# Patient Record
Sex: FEMALE | Race: WHITE | Employment: OTHER | URBAN - NONMETROPOLITAN AREA
[De-identification: names, ages, dates, MRNs, and addresses within clinical notes are randomized per-mention and may not be internally consistent; named-entity substitution may affect disease eponyms.]

---

## 2017-02-08 LAB
AVERAGE GLUCOSE: NORMAL
HBA1C MFR BLD: 8.4 %

## 2017-07-21 RX ORDER — AMLODIPINE BESYLATE 10 MG/1
10 TABLET ORAL DAILY
COMMUNITY
End: 2017-07-21 | Stop reason: SDUPTHER

## 2017-07-22 RX ORDER — AMLODIPINE BESYLATE 10 MG/1
10 TABLET ORAL DAILY
Qty: 30 TABLET | Refills: 5 | Status: SHIPPED | OUTPATIENT
Start: 2017-07-22 | End: 2018-01-23 | Stop reason: SDUPTHER

## 2017-08-10 VITALS
SYSTOLIC BLOOD PRESSURE: 156 MMHG | DIASTOLIC BLOOD PRESSURE: 98 MMHG | WEIGHT: 211 LBS | BODY MASS INDEX: 38.83 KG/M2 | HEIGHT: 62 IN

## 2017-08-10 DIAGNOSIS — I10 HYPERTENSION, ESSENTIAL: ICD-10-CM

## 2017-08-10 DIAGNOSIS — E78.5 HYPERLIPIDEMIA, UNSPECIFIED HYPERLIPIDEMIA TYPE: ICD-10-CM

## 2017-08-10 DIAGNOSIS — R00.2 PALPITATIONS: ICD-10-CM

## 2017-08-10 RX ORDER — OMEPRAZOLE 20 MG/1
20 CAPSULE, DELAYED RELEASE ORAL DAILY
COMMUNITY
End: 2019-10-16 | Stop reason: SDUPTHER

## 2017-08-10 RX ORDER — CANDESARTAN 16 MG/1
16 TABLET ORAL DAILY
COMMUNITY
End: 2018-03-02 | Stop reason: DRUGHIGH

## 2017-08-10 RX ORDER — HYDROCHLOROTHIAZIDE 25 MG/1
25 TABLET ORAL DAILY
COMMUNITY
End: 2018-03-02 | Stop reason: SDUPTHER

## 2018-01-24 RX ORDER — AMLODIPINE BESYLATE 10 MG/1
10 TABLET ORAL DAILY
Qty: 30 TABLET | Refills: 0 | Status: SHIPPED | OUTPATIENT
Start: 2018-01-24 | End: 2018-03-02 | Stop reason: SDUPTHER

## 2018-03-02 ENCOUNTER — OFFICE VISIT (OUTPATIENT)
Dept: FAMILY MEDICINE CLINIC | Age: 63
End: 2018-03-02
Payer: COMMERCIAL

## 2018-03-02 VITALS
BODY MASS INDEX: 36.53 KG/M2 | HEART RATE: 93 BPM | OXYGEN SATURATION: 98 % | DIASTOLIC BLOOD PRESSURE: 96 MMHG | WEIGHT: 196.5 LBS | SYSTOLIC BLOOD PRESSURE: 154 MMHG

## 2018-03-02 DIAGNOSIS — R00.2 PALPITATIONS: ICD-10-CM

## 2018-03-02 DIAGNOSIS — E11.8 TYPE 2 DIABETES MELLITUS WITH COMPLICATION, WITHOUT LONG-TERM CURRENT USE OF INSULIN (HCC): Primary | ICD-10-CM

## 2018-03-02 DIAGNOSIS — I10 HYPERTENSION, ESSENTIAL: ICD-10-CM

## 2018-03-02 DIAGNOSIS — Z11.59 ENCOUNTER FOR HEPATITIS C SCREENING TEST FOR LOW RISK PATIENT: ICD-10-CM

## 2018-03-02 LAB — HBA1C MFR BLD: 7.1 %

## 2018-03-02 PROCEDURE — 83036 HEMOGLOBIN GLYCOSYLATED A1C: CPT | Performed by: FAMILY MEDICINE

## 2018-03-02 PROCEDURE — 99214 OFFICE O/P EST MOD 30 MIN: CPT | Performed by: FAMILY MEDICINE

## 2018-03-02 RX ORDER — HYDROCHLOROTHIAZIDE 25 MG/1
25 TABLET ORAL DAILY
Qty: 30 TABLET | Refills: 5 | Status: SHIPPED | OUTPATIENT
Start: 2018-03-02 | End: 2018-09-04 | Stop reason: SDUPTHER

## 2018-03-02 RX ORDER — AMLODIPINE BESYLATE 10 MG/1
10 TABLET ORAL DAILY
Qty: 30 TABLET | Refills: 5 | Status: SHIPPED | OUTPATIENT
Start: 2018-03-02 | End: 2018-09-04 | Stop reason: SDUPTHER

## 2018-03-02 ASSESSMENT — ENCOUNTER SYMPTOMS
SHORTNESS OF BREATH: 0
COUGH: 0
ORTHOPNEA: 0
CONSTIPATION: 1
DIARRHEA: 1
BLURRED VISION: 0
ABDOMINAL PAIN: 1
WHEEZING: 0

## 2018-03-02 NOTE — TELEPHONE ENCOUNTER
Efrem Willoughby is calling to request a refill on the following medication(s):  Requested Prescriptions     Pending Prescriptions Disp Refills    metFORMIN (GLUCOPHAGE) 500 MG tablet [Pharmacy Med Name: METFORMIN 500MG TAB] 120 tablet 5     Sig: TAKE TWO TABLETS BY MOUTH TWICE DAILY       Last Visit Date (If Applicable):  4/9/9990    Next Visit Date:    Visit date not found

## 2018-03-02 NOTE — PROGRESS NOTES
[address] [address]  Dept: [phone]  Dept Fax: [phone]    Trudi Client is a 60 y.o. female who presents today for her medical conditions/complaints as noted below. Trudi Client is c/o of Hypertension; Hyperlipidemia; and Diabetes      HPI:     Hypertension   This is a chronic problem. The current episode started more than 1 year ago. The problem is unchanged. The problem is controlled. Pertinent negatives include no anxiety, blurred vision, chest pain, headaches, orthopnea, palpitations, peripheral edema, PND, shortness of breath or sweats. There are no associated agents to hypertension. Past treatments include angiotensin blockers and diuretics. Hyperlipidemia   Pertinent negatives include no chest pain or shortness of breath. Diabetes   Pertinent negatives for hypoglycemia include no headaches or sweats. Pertinent negatives for diabetes include no blurred vision and no chest pain. Trudi Client is a 60 y.o. female who presents for follow-up of hypertension, diabetes, and hyperlipidemia. She indicates that she is feeling well and denies any symptoms referable to her elevated blood pressure or diabetes. Specifically denies chest pain, palpitations, dyspnea, peripheral edema, thirst, frequent urination, and blurred vision. Current medication regimen is as listed below. She denies any side effects of medication, and has been taking it regularly. Home glucose readings have been not checked. Checks blood sugars NA. [facility]. Diabetic complications includenone. 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 Hasbeen following a diabetic diet. Has a lot of pain in her feet for many years, wonders what she can take, has all over. Usually takes IB. Does not always help.        Has some palpitations intermittently, palpitations when she is relaxing and watching TV, not when she is doing things. Has intermittent chest pains as well on the left side, sharp pains, last 10-15 secs, may last at the most a minute or 2. Does not take her breath away. BP Readings from Last 3 Encounters:   03/02/18 (!) 154/96   02/08/17 (!) 156/98          (goal 120/80)    Wt Readings from Last 3 Encounters:   03/02/18 196 lb 8 oz (89.1 kg)   02/08/17 211 lb (95.7 kg)        Past Medical History:   Diagnosis Date    Bone spur of foot     bilateral    Breast lump     left breast lump - benign (removed)    Hyperlipidemia     Hypertension     Type 2 diabetes mellitus without complication (HCC)       Past Surgical History:   Procedure Laterality Date    APPENDECTOMY      CERVICAL FUSION      C3-C5    COLON SURGERY Right 1999    resection    COLONOSCOPY  2003    diverticulitis    FOOT SURGERY Bilateral     bone spurs in the feet    HYSTERECTOMY VAGINAL         Family History   Problem Relation Age of Onset    Breast Cancer Mother     Diabetes Mother     Other Mother      diverticulitis (bowel resection)    Cancer Father      prostate, squamous cell Ca    Other Maternal Grandfather      diverticulitis       Social History   Substance Use Topics    Smoking status: Never Smoker    Smokeless tobacco: Never Used    Alcohol use No      Current Outpatient Prescriptions   Medication Sig Dispense Refill    SITagliptin (JANUVIA) 100 MG tablet Take 1 tablet by mouth daily 30 tablet 5    amLODIPine (NORVASC) 10 MG tablet Take 1 tablet by mouth daily 30 tablet 5    hydrochlorothiazide (HYDRODIURIL) 25 MG tablet Take 1 tablet by mouth daily 30 tablet 5    metFORMIN (GLUCOPHAGE) 500 MG tablet Take 500 mg by mouth 2 times daily (with meals) 2 tabs      omeprazole (PRILOSEC) 20 MG delayed release capsule Take 20 mg by mouth daily       No current facility-administered medications for this visit.       Allergies   Allergen Reactions    Sulfa Antibiotics Hives    Metronidazole Nausea And Vomiting       Health regular rhythm and intact distal pulses. Exam reveals no gallop and no friction rub. No murmur heard. Pulmonary/Chest: Effort normal and breath sounds normal. No respiratory distress. Abdominal: Soft. Musculoskeletal: She exhibits no edema. Lymphadenopathy:     She has no cervical adenopathy. Neurological: She is alert and oriented to person, place, and time. Skin: Skin is warm. Psychiatric: She has a normal mood and affect. Her behavior is normal. Judgment and thought content normal.   Nursing note and vitals reviewed. Diabetic Foot Exam    Deformities: Yes  scars medial heel and arthritic change  Pulses:  normal,   Edema: abnormal - trace bilateral  Skin lesions: normal  Callous:  No  Nails: normal    Sensory exam  Monofilament Sensation Left Foot: 10/10   Monofilament Sensation Right Foot: 10/10       Assessment/Plan:     1. Type 2 diabetes mellitus with complication, without long-term current use of insulin (HCC)  POCT glycosylated hemoglobin (Hb A1C)    SITagliptin (JANUVIA) 100 MG tablet    Vitamin B12    Lipid Panel    AST    Basic Metabolic Panel    Microalbumin, Ur    ALT    HM DIABETES FOOT EXAM   2. Encounter for hepatitis C screening test for low risk patient  Hepatitis C Antibody   3. Hypertension, essential  amLODIPine (NORVASC) 10 MG tablet    hydrochlorothiazide (HYDRODIURIL) 25 MG tablet    Holter Monitor 24 Hour   4. Palpitations  Holter Monitor 24 Hour    EKG 12 Lead         Lab Results   Component Value Date    LABA1C 7.1 03/02/2018       Return in about 3 months (around 6/2/2018) for HTN. Patient given educational materials - see patient instructions. Discussed use, benefit, and side effects of prescribed medications. All patient questions answered. Pt voiced understanding. Reviewed health maintenance. Instructed to continue current medications, diet and exercise. Patient agreed with treatment plan. Follow up as directed.      Electronically signed by Hemalatha Méndez Roberta Bella MD on 3/2/2018

## 2018-03-09 LAB
AGE FOR GFR: 63
ALT SERPL-CCNC: 44 UNITS/L
ANION GAP SERPL CALCULATED.3IONS-SCNC: 21 MMOL/L
AST SERPL-CCNC: 16 UNITS/L
BUN BLDV-MCNC: 14 MG/DL
CALCIUM SERPL-MCNC: 10.4 MG/DL
CHLORIDE BLD-SCNC: 104 MMOL/L
CHOLESTEROL/HDL RATIO: 5.3 RATIO
CHOLESTEROL: 276 MG/DL
CO2: 24 MMOL/L
CREAT SERPL-MCNC: 0.7 MG/DL
CREATININE, RANDOM: 271.4 MG/DL
EGFR BF: 102 ML/MIN/1.73 M2
EGFR BM: 138 ML/MIN/1.73 M2
EGFR WF: 85 ML/MIN/1.73 M2
EGFR WM: 114 ML/MIN/1.73 M2
GLUCOSE: 166 MG/DL
HDL, DIRECT: 52 MG/DL
HEPATITIS C IGG: NORMAL
LDL CHOLESTEROL CALCULATED: 164.2 MG/DL
MICROALBUMIN UR-MCNC: 10.8 MG/DL
MICROALBUMIN/CREAT UR-RTO: 39.8 MCG/MG CR
POTASSIUM SERPL-SCNC: 4.5 MMOL/L
SIGNAL/CUTOFF: NORMAL
SODIUM BLD-SCNC: 144 MMOL/L
TRIGL SERPL-MCNC: 299 MG/DL
VITAMIN B-12: 957 PG/ML
VLDLC SERPL CALC-MCNC: 60 MG/DL

## 2018-03-18 ENCOUNTER — TELEPHONE (OUTPATIENT)
Dept: FAMILY MEDICINE CLINIC | Age: 63
End: 2018-03-18

## 2018-03-19 ENCOUNTER — TELEPHONE (OUTPATIENT)
Dept: FAMILY MEDICINE CLINIC | Age: 63
End: 2018-03-19

## 2018-03-20 NOTE — TELEPHONE ENCOUNTER
Santos Holstein called back and was wondering about her labs, was wondering about a cholesterol medication and her B12 labs, was wondering about this and her memory and metformin

## 2018-03-21 NOTE — TELEPHONE ENCOUNTER
With the other issues she has going on I would like to wait until her appt in June to start the medication. I agree with a cholesterol mediation but would prefer to start at her appt rather than over the telephone.

## 2018-09-04 DIAGNOSIS — I10 HYPERTENSION, ESSENTIAL: ICD-10-CM

## 2018-09-04 DIAGNOSIS — E11.8 TYPE 2 DIABETES MELLITUS WITH COMPLICATION, WITHOUT LONG-TERM CURRENT USE OF INSULIN (HCC): ICD-10-CM

## 2018-09-04 NOTE — TELEPHONE ENCOUNTER
Teagan Sotelo is calling to request a refill on the following medication(s):  Requested Prescriptions     Pending Prescriptions Disp Refills    amLODIPine (NORVASC) 10 MG tablet 30 tablet 5     Sig: Take 1 tablet by mouth daily    hydrochlorothiazide (HYDRODIURIL) 25 MG tablet 30 tablet 5     Sig: Take 1 tablet by mouth daily    SITagliptin (JANUVIA) 100 MG tablet 30 tablet 5     Sig: Take 1 tablet by mouth daily       Last Visit Date (If Applicable):  Visit date not found    Next Visit Date:    Visit date not found

## 2018-09-05 RX ORDER — HYDROCHLOROTHIAZIDE 25 MG/1
25 TABLET ORAL DAILY
Qty: 30 TABLET | Refills: 5 | Status: SHIPPED | OUTPATIENT
Start: 2018-09-05 | End: 2019-03-05 | Stop reason: SDUPTHER

## 2018-09-05 RX ORDER — AMLODIPINE BESYLATE 10 MG/1
10 TABLET ORAL DAILY
Qty: 30 TABLET | Refills: 5 | Status: SHIPPED | OUTPATIENT
Start: 2018-09-05 | End: 2019-03-05 | Stop reason: SDUPTHER

## 2018-09-21 ENCOUNTER — TELEPHONE (OUTPATIENT)
Dept: FAMILY MEDICINE CLINIC | Age: 63
End: 2018-09-21

## 2019-03-05 DIAGNOSIS — I10 HYPERTENSION, ESSENTIAL: ICD-10-CM

## 2019-03-05 RX ORDER — HYDROCHLOROTHIAZIDE 25 MG/1
25 TABLET ORAL DAILY
Qty: 30 TABLET | Refills: 1 | Status: SHIPPED | OUTPATIENT
Start: 2019-03-05 | End: 2019-05-08 | Stop reason: SDUPTHER

## 2019-03-05 RX ORDER — AMLODIPINE BESYLATE 10 MG/1
10 TABLET ORAL DAILY
Qty: 30 TABLET | Refills: 1 | Status: SHIPPED | OUTPATIENT
Start: 2019-03-05 | End: 2019-05-15 | Stop reason: SDUPTHER

## 2019-05-08 DIAGNOSIS — I10 HYPERTENSION, ESSENTIAL: ICD-10-CM

## 2019-05-08 RX ORDER — HYDROCHLOROTHIAZIDE 25 MG/1
TABLET ORAL
Qty: 30 TABLET | Refills: 0 | Status: SHIPPED | OUTPATIENT
Start: 2019-05-08 | End: 2019-05-15 | Stop reason: SDUPTHER

## 2019-05-08 NOTE — TELEPHONE ENCOUNTER
Shary Lanes is calling to request a refill on the following medication(s):  Requested Prescriptions     Pending Prescriptions Disp Refills    metFORMIN (GLUCOPHAGE) 500 MG tablet [Pharmacy Med Name: METFORMIN 500MG TAB] 120 tablet 5     Sig: TAKE 2 TABLETS BY MOUTH TWICE DAILY    hydrochlorothiazide (HYDRODIURIL) 25 MG tablet [Pharmacy Med Name: HYDROCHLOROT 25MG   TAB] 30 tablet 1     Sig: TAKE 1 TABLET BY MOUTH ONCE DAILY       Last Visit Date (If Applicable):  5/4/0116    Next Visit Date:    5/15/2019

## 2019-05-15 ENCOUNTER — OFFICE VISIT (OUTPATIENT)
Dept: FAMILY MEDICINE CLINIC | Age: 64
End: 2019-05-15
Payer: MEDICARE

## 2019-05-15 VITALS
HEART RATE: 102 BPM | DIASTOLIC BLOOD PRESSURE: 96 MMHG | OXYGEN SATURATION: 97 % | SYSTOLIC BLOOD PRESSURE: 154 MMHG | BODY MASS INDEX: 36.81 KG/M2 | WEIGHT: 198 LBS

## 2019-05-15 DIAGNOSIS — M25.50 ARTHRALGIA, UNSPECIFIED JOINT: ICD-10-CM

## 2019-05-15 DIAGNOSIS — R00.2 PALPITATIONS: ICD-10-CM

## 2019-05-15 DIAGNOSIS — I10 HYPERTENSION, ESSENTIAL: ICD-10-CM

## 2019-05-15 DIAGNOSIS — R22.31 MASS OF RIGHT UPPER EXTREMITY: ICD-10-CM

## 2019-05-15 DIAGNOSIS — E11.8 TYPE 2 DIABETES MELLITUS WITH COMPLICATION, WITHOUT LONG-TERM CURRENT USE OF INSULIN (HCC): Primary | ICD-10-CM

## 2019-05-15 DIAGNOSIS — T50.905A ADVERSE EFFECT DUE TO CORRECT MEDICINAL SUBSTANCE, PROPERLY GIVEN, INITIAL ENCOUNTER: ICD-10-CM

## 2019-05-15 DIAGNOSIS — E78.5 HYPERLIPIDEMIA, UNSPECIFIED HYPERLIPIDEMIA TYPE: ICD-10-CM

## 2019-05-15 DIAGNOSIS — R10.11 RIGHT UPPER QUADRANT PAIN: ICD-10-CM

## 2019-05-15 LAB — HBA1C MFR BLD: 8.1 %

## 2019-05-15 PROCEDURE — G8400 PT W/DXA NO RESULTS DOC: HCPCS | Performed by: FAMILY MEDICINE

## 2019-05-15 PROCEDURE — 3045F PR MOST RECENT HEMOGLOBIN A1C LEVEL 7.0-9.0%: CPT | Performed by: FAMILY MEDICINE

## 2019-05-15 PROCEDURE — G8427 DOCREV CUR MEDS BY ELIG CLIN: HCPCS | Performed by: FAMILY MEDICINE

## 2019-05-15 PROCEDURE — 83036 HEMOGLOBIN GLYCOSYLATED A1C: CPT | Performed by: FAMILY MEDICINE

## 2019-05-15 PROCEDURE — 2022F DILAT RTA XM EVC RTNOPTHY: CPT | Performed by: FAMILY MEDICINE

## 2019-05-15 PROCEDURE — 1123F ACP DISCUSS/DSCN MKR DOCD: CPT | Performed by: FAMILY MEDICINE

## 2019-05-15 PROCEDURE — 4040F PNEUMOC VAC/ADMIN/RCVD: CPT | Performed by: FAMILY MEDICINE

## 2019-05-15 PROCEDURE — 99214 OFFICE O/P EST MOD 30 MIN: CPT | Performed by: FAMILY MEDICINE

## 2019-05-15 PROCEDURE — 1036F TOBACCO NON-USER: CPT | Performed by: FAMILY MEDICINE

## 2019-05-15 PROCEDURE — 1090F PRES/ABSN URINE INCON ASSESS: CPT | Performed by: FAMILY MEDICINE

## 2019-05-15 PROCEDURE — 3017F COLORECTAL CA SCREEN DOC REV: CPT | Performed by: FAMILY MEDICINE

## 2019-05-15 PROCEDURE — G8417 CALC BMI ABV UP PARAM F/U: HCPCS | Performed by: FAMILY MEDICINE

## 2019-05-15 RX ORDER — HYDROCHLOROTHIAZIDE 25 MG/1
TABLET ORAL
Qty: 30 TABLET | Refills: 5 | Status: SHIPPED | OUTPATIENT
Start: 2019-05-15 | End: 2019-10-16 | Stop reason: SDUPTHER

## 2019-05-15 RX ORDER — AMLODIPINE BESYLATE 10 MG/1
10 TABLET ORAL DAILY
Qty: 30 TABLET | Refills: 5 | Status: SHIPPED | OUTPATIENT
Start: 2019-05-15 | End: 2019-10-16 | Stop reason: SDUPTHER

## 2019-05-15 ASSESSMENT — PATIENT HEALTH QUESTIONNAIRE - PHQ9
SUM OF ALL RESPONSES TO PHQ QUESTIONS 1-9: 0
SUM OF ALL RESPONSES TO PHQ QUESTIONS 1-9: 0
2. FEELING DOWN, DEPRESSED OR HOPELESS: 0
1. LITTLE INTEREST OR PLEASURE IN DOING THINGS: 0
SUM OF ALL RESPONSES TO PHQ9 QUESTIONS 1 & 2: 0

## 2019-05-15 ASSESSMENT — ENCOUNTER SYMPTOMS
WHEEZING: 0
ABDOMINAL PAIN: 0
COUGH: 0
DIARRHEA: 1
SHORTNESS OF BREATH: 0
CONSTIPATION: 0

## 2019-05-15 NOTE — PATIENT INSTRUCTIONS
Can try the OTC tumeric daily for the joints     Can try the Ibuprofen but limit the dose-- max 3 per day. Continue with the weight loss and exercise to help with the pain as well. Check the uric acid level for possible gout with the family history.

## 2019-05-15 NOTE — TELEPHONE ENCOUNTER
Only a 15 day supply was sent in with 60 tabs. Patient is taking two tablets by mouth twice daily, switched to 120 tabs.      Tommy Lutheran is calling to request a refill on the following medication(s):  Requested Prescriptions     Pending Prescriptions Disp Refills    metFORMIN (GLUCOPHAGE) 500 MG tablet 120 tablet 5     Sig: TAKE 2 TABLETS BY MOUTH TWICE DAILY       Last Visit Date (If Applicable):  0/95/4748    Next Visit Date:    9/18/2019

## 2019-05-15 NOTE — PROGRESS NOTES
[address] [id number]  Dept: [phone]  Dept [phone]    Ryan Adams is a 71 y.o. female who presents today for her medical conditions/complaints as notedbelow. Ryan Adams is c/o of 3 Month Follow-Up and Diabetes      HPI:     HPI    Ryan Adams is a 71 y.o. female who presents for follow-up of hypertension, diabetes, and hyperlipidemia. She indicates that she is feeling well and denies any symptoms referable to her elevated blood pressure or diabetes. Specifically denies chest pain, palpitations, dyspnea, peripheral edema, thirst, frequent urination, and blurred vision. Current medication regimen is as listed below. She denies any side effects of medication, and has been taking it regularly. Home glucose readings have been not checking. Diabetic complications includenone. shehas been exercising regularly working outside. Hasbeen following a diabetic diet. Has been checking her BP at home and has been in the 120's/ 70-80's     Has pain in the left great toe often, will flare up frequently. Gets so sore the sheets even hurt to touch it. Thinks it may be gout because has a family history but has never been actually diagnosed herself with gout. No fevers or chills and today is a good day with the toe    Gets a lot of knee pain, back pain. Has tried the tylenol arthritis. Has been taking the IBU which does help    Has some pain in her RUQ at times, usually at night will last through the middles. Has intermittent nausea as well, the nausea comes on quickly. Has had the nausea for years. Bowels go back and forth from loose to semi formed but this has been for years. The pain in the RUQ is more recent, maybe the last few months.   No change in the bowels from her previous pattern, no blood in her bowels     BP Readings from Last 3 Encounters:   05/15/19 (!) 154/96   18 (!) 154/96   17 (!) 156/98 (goal 120/80)    Wt Readings from Last 3 Encounters:   05/15/19 198 lb (89.8 kg)   03/02/18 196 lb 8 oz (89.1 kg)   02/08/17 211 lb (95.7 kg)        Past Medical History:   Diagnosis Date    Bone spur of foot     bilateral    Breast lump     left breast lump - benign (removed)    Hyperlipidemia     Hypertension     Type 2 diabetes mellitus without complication (HCC)       Past Surgical History:   Procedure Laterality Date    APPENDECTOMY      CERVICAL FUSION      C3-C5    COLON SURGERY Right 1999    resection    COLONOSCOPY  2003    diverticulitis    FOOT SURGERY Bilateral     bone spurs in the feet    HYSTERECTOMY VAGINAL         Family History   Problem Relation Age of Onset    Breast Cancer Mother     Diabetes Mother     Other Mother         diverticulitis (bowel resection)    Cancer Father         prostate, squamous cell Ca    Other Maternal Grandfather         diverticulitis       Social History     Tobacco Use    Smoking status: Never Smoker    Smokeless tobacco: Never Used   Substance Use Topics    Alcohol use: No      Current Outpatient Medications   Medication Sig Dispense Refill    dapagliflozin (FARXIGA) 10 MG tablet Take 1 tablet by mouth every morning 30 tablet 3    metFORMIN (GLUCOPHAGE) 500 MG tablet TAKE 2 TABLETS BY MOUTH TWICE DAILY 60 tablet 0    hydrochlorothiazide (HYDRODIURIL) 25 MG tablet TAKE 1 TABLET BY MOUTH ONCE DAILY 30 tablet 0    amLODIPine (NORVASC) 10 MG tablet Take 1 tablet by mouth daily 30 tablet 1    omeprazole (PRILOSEC) 20 MG delayed release capsule Take 20 mg by mouth daily      SITagliptin (JANUVIA) 100 MG tablet Take 1 tablet by mouth daily 30 tablet 5     No current facility-administered medications for this visit.       Allergies   Allergen Reactions    Sulfa Antibiotics Hives    Metronidazole Nausea And Vomiting       Health Maintenance   Topic Date Due    Diabetic retinal exam  05/02/1964    HIV screen  05/02/1969    DTaP/Tdap/Td vaccine (1 - Tdap) 05/02/1973    Shingles Vaccine (1 of 2) 05/02/2004    Colon cancer screen colonoscopy  05/02/2004    Breast cancer screen  05/09/2017    Diabetic foot exam  03/02/2019    A1C test (Diabetic or Prediabetic)  03/02/2019    Diabetic microalbuminuria test  03/09/2019    Lipid screen  03/09/2019    Potassium monitoring  03/09/2019    Creatinine monitoring  03/09/2019    Pneumococcal 65+ years Vaccine (1 of 2 - PCV13) 05/02/2019    DEXA (modify frequency per FRAX score)  05/02/2019    Flu vaccine (Season Ended) 09/01/2019    Hepatitis C screen  Completed       Subjective:      Review of Systems  Respiratory: Negative for cough, shortness of breath and wheezing. Cardiovascular: Positive for chest pain ( still experiencing some of the same sx that i had before i had the holter monitor. ) and leg swelling ( left ankle). Gastrointestinal: Positive for diarrhea ( i have diverticulitis). Negative for abdominal pain and constipation. Endocrine: Negative for polydipsia. Genitourinary: Negative for frequency and urgency. I do have to get up during the night     Neurological: Positive for dizziness ( i kind of think that it is BS related, but its not bad. ). Negative for headaches. Objective:     BP (!) 154/96   Pulse 102   Wt 198 lb (89.8 kg)   SpO2 97%   BMI 36.81 kg/m²     Physical Exam   Constitutional: She is oriented to person, place, and time. She appears well-developed and well-nourished. HENT:   Head: Normocephalic and atraumatic. Eyes: Conjunctivae and EOM are normal.   Neck: Normal range of motion. Neck supple. No JVD present. No thyromegaly present. Cardiovascular: Normal rate, regular rhythm and intact distal pulses. Exam reveals no gallop and no friction rub. No murmur heard. Pulmonary/Chest: Effort normal and breath sounds normal. No respiratory distress. Abdominal: Soft. There is tenderness (RUQ with positive Michaels's sign).    Musculoskeletal: She for the joints     Can try the Ibuprofen but limit the dose-- max 3 per day. Continue with the weight loss and exercise to help with the pain as well. Check the uric acid level for possible gout with the family history. -- watch for further GI irritation with the NSAIDS and the tumeric. Lab Results   Component Value Date    CHOL 276 (H) 03/09/2018    TRIG 299 (H) 03/09/2018    ALT 44 03/09/2018    AST 16 03/09/2018     03/09/2018    K 4.5 03/09/2018     03/09/2018    CREATININE 0.7 03/09/2018    BUN 14 03/09/2018    CO2 24 03/09/2018    LABA1C 8.1 05/15/2019    LABA1C 7.1 03/02/2018    LABA1C 8.4 02/08/2017    LABMICR 10.8 (H) 03/09/2018       Return 4 months, for AWV. Patient given educational materials - see patientinstructions. Discussed use, benefit, and side effects of prescribed medications. All patient questions answered. Pt voiced understanding. Reviewed health maintenance. Instructed to continue current medications, diet andexercise. Patient agreed with treatment plan. Follow up as directed.      Electronically signed by Mikayla Montes MD on 5/15/2019

## 2019-05-16 ENCOUNTER — TELEPHONE (OUTPATIENT)
Dept: FAMILY MEDICINE CLINIC | Age: 64
End: 2019-05-16

## 2019-05-16 DIAGNOSIS — K81.1 CHOLECYSTITIS, CHRONIC: ICD-10-CM

## 2019-05-16 DIAGNOSIS — E11.8 TYPE 2 DIABETES MELLITUS WITH COMPLICATION, WITHOUT LONG-TERM CURRENT USE OF INSULIN (HCC): Primary | ICD-10-CM

## 2019-05-16 NOTE — TELEPHONE ENCOUNTER
Rose Marie Avila will cost her $465/month, is there anything cheaper or could her metformin be increased? ?    Please Advise

## 2019-05-17 NOTE — TELEPHONE ENCOUNTER
Can you please check with the pharmacy, There is not really other good options that will be less expensive

## 2019-05-17 NOTE — TELEPHONE ENCOUNTER
She stated it was monthly as far as she knew and she will not be paying this amount due to her  having cancer and the money needs spent towards that She would still like something cheaper if there is anything sent in

## 2019-05-20 NOTE — TELEPHONE ENCOUNTER
Please let Kiandbalwinder Torrezdayton know this is an inappropriate dose and an inappropriate medication for her. It is not equivalent and has potential harm for her. Please have her see if the $485 per month is until her deductible is met. For example the first month or is this every month?

## 2019-05-20 NOTE — TELEPHONE ENCOUNTER
Gary for Berenice to contact her insurance to see what is compatible and what they would cover and to return call

## 2019-05-21 NOTE — TELEPHONE ENCOUNTER
Sent in the onglyza, please check with [facility] and see what her cost would be on this and if still unreasonable then will need to try something else less effective and less recommended.   Script for the metformin also sent in

## 2019-05-21 NOTE — TELEPHONE ENCOUNTER
Please also see the previous notes re medications-- also let Silvia Camarillo know her GB USN did show her GB is mildly abnormal and likely needs to come out -- is likely causing her sx.   Would recommend she see surgery about having this removed,  Referral put in , in the meantime would try to really stick to a very low fat diet to try to help control sx

## 2019-05-22 RX ORDER — GLIMEPIRIDE 2 MG/1
2 TABLET ORAL
Qty: 30 TABLET | Refills: 5 | Status: SHIPPED | OUTPATIENT
Start: 2019-05-22 | End: 2019-10-16 | Stop reason: SDUPTHER

## 2019-05-22 NOTE — TELEPHONE ENCOUNTER
bulmaro med is $481 and they , the pharmacist is suggesting amorill? Or micronage?     She stated that she has a high deductible and then it goes into to a donut hole, not sure what medication will be cost effective for her

## 2019-05-22 NOTE — TELEPHONE ENCOUNTER
glimeperide sent in. Patient cautioned re hypoglycemia potential. Not to take if not eating. Continue with the metformin.   Sofi Hence will start after her GB is removed because has not been eating well with the GB issue right now

## 2019-05-23 ENCOUNTER — OFFICE VISIT (OUTPATIENT)
Dept: SURGERY | Age: 64
End: 2019-05-23
Payer: MEDICARE

## 2019-05-23 VITALS
HEART RATE: 105 BPM | WEIGHT: 195 LBS | DIASTOLIC BLOOD PRESSURE: 90 MMHG | TEMPERATURE: 99.2 F | HEIGHT: 62 IN | SYSTOLIC BLOOD PRESSURE: 143 MMHG | BODY MASS INDEX: 35.88 KG/M2

## 2019-05-23 DIAGNOSIS — K52.9 CHRONIC DIARRHEA: ICD-10-CM

## 2019-05-23 DIAGNOSIS — R10.13 ABDOMINAL PAIN, EPIGASTRIC: Primary | ICD-10-CM

## 2019-05-23 DIAGNOSIS — R11.2 INTRACTABLE VOMITING WITH NAUSEA, UNSPECIFIED VOMITING TYPE: ICD-10-CM

## 2019-05-23 LAB
A/G RATIO: 1.4 RATIO
AGE FOR GFR: 65
ALBUMIN: 4.7 G/DL (ref 3.5–5)
ALK PHOSPHATASE: 91 UNITS/L (ref 38–126)
ALT SERPL-CCNC: 28 UNITS/L (ref 9–52)
ANION GAP SERPL CALCULATED.3IONS-SCNC: 16 MMOL/L
AST SERPL-CCNC: 20 UNITS/L (ref 14–36)
BASOPHILS # BLD: 0.19 THOU/MM3 (ref 0–0.3)
BILIRUB SERPL-MCNC: 0.5 MG/DL (ref 0.2–1.3)
BUN BLDV-MCNC: 16 MG/DL (ref 7–17)
CALCIUM SERPL-MCNC: 9.3 MG/DL (ref 8.4–10.2)
CHLORIDE BLD-SCNC: 102 MMOL/L (ref 98–120)
CO2: 24 MMOL/L (ref 22–31)
CREAT SERPL-MCNC: 0.7 MG/DL (ref 0.5–1)
DIFFERENTIAL: AUTOMATED DIFF
EGFR BF: 102 ML/MIN/1.73 M2
EGFR BM: 137 ML/MIN/1.73 M2
EGFR WF: 84 ML/MIN/1.73 M2
EGFR WM: 113 ML/MIN/1.73 M2
EOSINOPHIL # BLD: 0.69 THOU/MM3 (ref 0–1.1)
GLOBULIN: 3.3 G/DL
GLUCOSE: 144 MG/DL (ref 65–105)
HCT VFR BLD CALC: 43.3 % (ref 37–47)
HEMOGLOBIN: 15 G/DL (ref 12–16)
LYMPHOCYTES # BLD: 2.51 THOU/MM3 (ref 1–5.5)
MCH RBC QN AUTO: 29.3 PG (ref 28.5–32)
MCHC RBC AUTO-ENTMCNC: 34.5 G/DL (ref 32–37)
MCV RBC AUTO: 84.8 FL (ref 80–94)
MONOCYTES # BLD: 0.8 THOU/MM3 (ref 0.1–1)
NEUTROPHILS: 6.7 THOU/MM3 (ref 2–8.1)
PDW BLD-RTO: 11.6 % (ref 8.5–15.5)
PLATELET # BLD: 328 THOU/MM3 (ref 130–400)
PMV BLD AUTO: 8.4 FL (ref 7.4–11)
POTASSIUM SERPL-SCNC: 3.4 MMOL/L (ref 3.6–5)
RBC # BLD: 5.11 M/UL (ref 4.2–5.4)
SODIUM BLD-SCNC: 139 MMOL/L (ref 135–145)
TOTAL PROTEIN: 8 G/DL (ref 6.3–8.2)
WBC # BLD: 10.89 THOU/ML3 (ref 4.8–10)

## 2019-05-23 PROCEDURE — G8417 CALC BMI ABV UP PARAM F/U: HCPCS | Performed by: SURGERY

## 2019-05-23 PROCEDURE — 3017F COLORECTAL CA SCREEN DOC REV: CPT | Performed by: SURGERY

## 2019-05-23 PROCEDURE — G8427 DOCREV CUR MEDS BY ELIG CLIN: HCPCS | Performed by: SURGERY

## 2019-05-23 PROCEDURE — 4040F PNEUMOC VAC/ADMIN/RCVD: CPT | Performed by: SURGERY

## 2019-05-23 PROCEDURE — 1090F PRES/ABSN URINE INCON ASSESS: CPT | Performed by: SURGERY

## 2019-05-23 PROCEDURE — 1036F TOBACCO NON-USER: CPT | Performed by: SURGERY

## 2019-05-23 PROCEDURE — G8400 PT W/DXA NO RESULTS DOC: HCPCS | Performed by: SURGERY

## 2019-05-23 PROCEDURE — 1123F ACP DISCUSS/DSCN MKR DOCD: CPT | Performed by: SURGERY

## 2019-05-23 PROCEDURE — 99203 OFFICE O/P NEW LOW 30 MIN: CPT | Performed by: SURGERY

## 2019-05-23 RX ORDER — ONDANSETRON 4 MG/1
4 TABLET, ORALLY DISINTEGRATING ORAL EVERY 8 HOURS PRN
Qty: 20 TABLET | Refills: 0 | Status: SHIPPED | OUTPATIENT
Start: 2019-05-23 | End: 2020-10-22 | Stop reason: SDUPTHER

## 2019-05-23 ASSESSMENT — ENCOUNTER SYMPTOMS
DIARRHEA: 1
CONSTIPATION: 0
COUGH: 0
NAUSEA: 1
HEMATOCHEZIA: 0
TROUBLE SWALLOWING: 0
ABDOMINAL PAIN: 1
SHORTNESS OF BREATH: 0
BLOOD IN STOOL: 0
CHOKING: 0
EYES NEGATIVE: 1
ABDOMINAL DISTENTION: 1
CHEST TIGHTNESS: 0
BACK PAIN: 0
WHEEZING: 0
VOICE CHANGE: 0
SORE THROAT: 0
VOMITING: 1

## 2019-05-23 ASSESSMENT — CROHNS DISEASE ACTIVITY INDEX (CDAI): CDAI SCORE: 0

## 2019-05-23 NOTE — PROGRESS NOTES
Subjective:      Patient ID: Efrain Palmer is a 71 y.o. female. Abdominal Pain   This is a chronic problem. The current episode started more than 1 year ago (For several years). The problem occurs 2 to 4 times per day. Duration: 5 - 10 minutes. The problem has been gradually worsening. The pain is located in the epigastric region. The pain is at a severity of 8/10. The quality of the pain is sharp. Associated symptoms include anorexia, arthralgias, diarrhea ( 2 loose bowel movements a day), dysuria, myalgias, nausea, vomiting ( 2 - 3 times a week she vomits first thing in the morning) and weight loss ( 9 lbs over 1 year). Pertinent negatives include no constipation, fever, frequency, headaches, hematochezia, hematuria or melena. The pain is aggravated by eating. Relieved by: heating pad. She has tried nothing for the symptoms. Prior diagnostic workup includes ultrasound. There is no history of abdominal surgery, Crohn's disease, gallstones, GERD, irritable bowel syndrome, pancreatitis, PUD or ulcerative colitis.      She blames a lot of her pain on \"my diverticulitis>    Past Medical History:   Diagnosis Date    Bone spur of foot     bilateral    Breast lump     left breast lump - benign (removed)    Hyperlipidemia     Hypertension     Type 2 diabetes mellitus without complication ([facility] [address]      Past Surgical History:   Procedure Laterality Date    APPENDECTOMY      CERVICAL FUSION      C3-C5    COLON SURGERY Right 1999    resection    COLONOSCOPY  2003    diverticulitis    FOOT SURGERY Bilateral     bone spurs in the feet    HYSTERECTOMY VAGINAL      NECK SURGERY       Current Outpatient Medications   Medication Sig Dispense Refill    metFORMIN (GLUCOPHAGE) 500 MG tablet TAKE 2 TABLETS BY MOUTH TWICE DAILY 120 tablet 5    hydrochlorothiazide (HYDRODIURIL) 25 MG tablet TAKE 1 TABLET BY MOUTH ONCE DAILY 30 tablet 5    amLODIPine (NORVASC) 10 MG tablet Take 1 tablet by mouth daily 30 tablet 5    omeprazole (PRILOSEC) 20 MG delayed release capsule Take 20 mg by mouth daily      glimepiride (AMARYL) 2 MG tablet Take 1 tablet by mouth every morning (before breakfast) 30 tablet 5     No current facility-administered medications for this visit. Allergies   Allergen Reactions    Sulfa Antibiotics Hives    Metronidazole Nausea And Vomiting     Social History     Tobacco Use    Smoking status: Never Smoker    Smokeless tobacco: Never Used   Substance Use Topics    Alcohol use: No    Drug use: No     Family History   Problem Relation Age of Onset    Breast Cancer Mother     Diabetes Mother     Other Mother         diverticulitis (bowel resection)    Cancer Father         prostate, squamous cell Ca    Other Maternal Grandfather         diverticulitis    Heart Attack Maternal Grandmother     Cancer Paternal Grandmother     Cancer Paternal Grandfather        Review of Systems   Constitutional: Positive for appetite change, chills, unexpected weight change and weight loss ( 9 lbs over 1 year). Negative for activity change and fever. HENT: Negative for sore throat, trouble swallowing and voice change. Eyes: Negative. Respiratory: Negative for cough, choking, chest tightness, shortness of breath and wheezing. Cardiovascular: Negative for chest pain and palpitations. Gastrointestinal: Positive for abdominal distention, abdominal pain, anorexia, diarrhea ( 2 loose bowel movements a day), nausea and vomiting ( 2 - 3 times a week she vomits first thing in the morning). Negative for blood in stool, constipation, hematochezia and melena. Endocrine: Positive for polydipsia and polyuria. Negative for polyphagia. Genitourinary: Positive for dysuria. Negative for difficulty urinating, frequency, hematuria, pelvic pain, vaginal bleeding, vaginal discharge and vaginal pain. Musculoskeletal: Positive for arthralgias, myalgias, neck pain and neck stiffness. Negative for back pain.    Neurological: Positive for dizziness and light-headedness. Negative for seizures, speech difficulty, weakness and headaches. Hematological: Negative for adenopathy. Does not bruise/bleed easily. Psychiatric/Behavioral: Negative for agitation, behavioral problems, confusion, decreased concentration and dysphoric mood. The patient is not nervous/anxious. BP (!) 143/90   Pulse 105   Temp 99.2 °F (37.3 °C)   Ht 5' 2\" (1.575 m)   Wt 195 lb (88.5 kg)   BMI 35.67 kg/m²     Objective:   Physical Exam   Constitutional: She appears well-developed and well-nourished. No distress. HENT:   Head: Normocephalic and atraumatic. Eyes: Pupils are equal, round, and reactive to light. Conjunctivae and EOM are normal.   Neck: Normal range of motion. Neck supple. No tracheal deviation present. No thyromegaly present. Cardiovascular: Normal rate, regular rhythm and normal heart sounds. Pulmonary/Chest: Effort normal and breath sounds normal. No stridor. No respiratory distress. She has no wheezes. Abdominal: Soft. Bowel sounds are normal. She exhibits no distension and no mass. There is no tenderness. There is no rebound and no guarding. No hernia. Lymphadenopathy:     She has no cervical adenopathy. Skin: She is not diaphoretic. Ultrasound thicken gall bladder. No stones    HgbA1c was 8.1  Assessment:      1) Epigastric pain, nausea and vomiting - small amount of inadvertent weight loss. - The brief and frequent nature of her pain is atypical for biliary colic. She also has no stones. - I am more concerned she has diabetic gastroparesis. Her A1c is high, and when I ask about her diabetes control, she does not seem to take it very seriously. Also consider gastritis, ulcer disease, GERD, esophageal spasm. Also consider IBS  2) Chronic Diarrhea - history of colectomy - on going diffuse abdominal pain which she blames on her diverticulitis. Most likely this is not diverticulitis.   Consider IBS and diabetic gastroenteropathy. 3) No recent lab work other than the A1c - she has had mild hypercalcemia in the past.  This should be rechecked. Hypercalcemia can also cause abdominal pain and GI symptoms      Plan:      1) CBC, CMP  2) EGD and Colonoscopy - I think we should do a pretty thoroughly evaluation of her before considering cholecystectomy. Her symptoms are not typical biliary symptoms. Risks and benefits of colonoscopy and EGD (upper G.I. Endoscopy) were discussed with Duy Carter. In particular I discussed the nature and limitations of the procedures, the possibility of incomplete colonoscopy and failure to make a diagnosis with either procedure. I also discussed the risks and consequences of reactions to the sedatives, bleeding and perforation. Alternate ways of evaluating the colon including barium enema, CT colonography and sigmoidoscopy were discussed, and alternate ways of evaluating the upper g.i tract were also discussed including barium swallow and CT scan were discussed. she  was also given the opportunity to have any questions answered, and encouraged to call the office with additional issues. 3) She is already on a PPI.  Will start Lisa Amaro MD

## 2019-05-23 NOTE — PATIENT INSTRUCTIONS
Patient Education        Colonoscopy: Before Your Procedure  What is a colonoscopy? A colonoscopy is a test that lets a doctor look inside your colon. The doctor uses a thin, lighted tube called a colonoscope to look for problems. These include small growths called polyps, cancer, or bleeding. During the test, the doctor can take samples of tissue that can be checked for cancer or other problems. This is called a biopsy. The doctor can also take out polyps. Before the test, you will need to stop eating solid foods. You also will drink a liquid or take a tablet that cleans out your colon. This helps your doctor be able to see inside your colon during the test.  Follow-up care is a key part of your treatment and safety. Be sure to make and go to all appointments, and call your doctor if you are having problems. It's also a good idea to know your test results and keep a list of the medicines you take. What happens before the procedure?   Preparing for the procedure    · Understand exactly what procedure is planned, along with the risks, benefits, and other options. · Tell your doctors ALL the medicines, vitamins, supplements, and herbal remedies you take. Some of these can increase the risk of bleeding or interact with anesthesia.     · If you take blood thinners, such as warfarin (Coumadin), clopidogrel (Plavix), or aspirin, be sure to talk to your doctor. He or she will tell you if you should stop taking these medicines before your procedure. Make sure that you understand exactly what your doctor wants you to do.     · Your doctor will tell you which medicines to take or stop before your procedure. You may need to stop taking certain medicines a week or more before the procedure. So talk to your doctor as soon as you can.     · If you have an advance directive, let your doctor know. It may include a living will and a durable power of  for health care.  Bring a copy to the hospital. If you don't have one, you may want to prepare one. It lets your doctor and loved ones know your health care wishes. Doctors advise that everyone prepare these papers before any type of surgery or procedure.    Before the procedure    · Follow your doctor's directions about when to stop eating solid foods and drink only clear liquids. You can drink water, clear juices, clear broths, flavored ice pops, and gelatin (such as Jell-O). Do not eat or drink anything red or purple. This includes grape juice and grape-flavored ice pops. It also includes fruit punch and cherry gelatin.     · Drink the \"colon prep\" liquid as your doctor tells you. You will want to stay home, because the liquid will make you go to the bathroom a lot. Your stools will be loose and watery. It is very important to drink all of the liquid. If you have problems drinking it, call your doctor. Some doctors may have you take a tablet rather than drink a liquid.     · Do not eat any solid foods after you drink the colon prep.     · Stop drinking clear liquids 6 to 8 hours before the test.   Procedures can be stressful. This information will help you understand what you can expect. And it will help you safely prepare for your procedure. What happens on the day of the procedure? · Follow the instructions exactly about when to stop eating and drinking. If you don't, your procedure may be canceled. If your doctor told you to take your medicines on the day of the procedure, take them with only a sip of water.     · Take a bath or shower before you come in for your procedure. Do not apply lotions, perfumes, deodorants, or nail polish.     · Take off all jewelry and piercings. And take out contact lenses, if you wear them.    At the doctor's office or hospital   · Bring a picture ID.     · You will be kept comfortable and safe by your anesthesia provider.  The anesthesia may make you sleep.     · You will lie on your back or your side with your knees drawn up toward your belly. The doctor will gently put a gloved finger into your anus. Then the doctor puts the scope in and moves it into your colon. The scope goes in easily because it is lubricated.     · The doctor may also use small tools to take tissue samples for a biopsy or to remove polyps. This does not hurt.     · The test usually takes 30 to 45 minutes. But it may take longer. It depends on what is found and what is done. Going home   · Be sure you have someone to drive you home. Anesthesia and pain medicine make it unsafe for you to drive.     · You will be given more specific instructions about recovering from your procedure. When should you call your doctor? · You have questions or concerns.     · You don't understand how to prepare for your procedure.     · You are having trouble with the bowel prep.     · You become ill before the procedure (such as fever, flu, or a cold).     · You need to reschedule or have changed your mind about having the procedure. Where can you learn more? Go to https://[employer]. org and sign in to your [employer] account. Enter C315 in the [employer] box to learn more about \"Colonoscopy: Before Your Procedure. \"     If you do not have an account, please click on the \"Sign Up Now\" link. Current as of: December 19, 2018  Content Version: 12.0  © 2639-5806 [employer], [employer]. Care instructions adapted under license by [facility] ([facility]). If you have questions about a medical condition or this instruction, always ask your healthcare professional. [place] [address] any warranty or liability for your use of this information. Patient Education        Upper GI Endoscopy: Before Your Procedure  What is an upper GI endoscopy? An upper gastrointestinal (or GI) endoscopy is a test that allows your doctor to look at the inside of your esophagus, stomach, and the first part of your small intestine, called the duodenum.  The esophagus is the tube that carries food to your stomach. The doctor uses a thin, lighted tube that bends. It is called an endoscope, or scope. The doctor puts the tip of the scope in your mouth and gently moves it down your throat. The scope is a flexible video camera. The doctor looks at a monitor (like a TV set or a computer screen) as he or she moves the scope. A doctor may do this test, which is also called a procedure, to look for ulcers, tumors, infection, or bleeding. It also can be used to look for signs of acid backing up into your esophagus. This is called gastroesophageal reflux disease, or GERD. The doctor can use the scope to take a sample of tissue for study (a biopsy). The doctor also can use the scope to take out growths or stop bleeding. Follow-up care is a key part of your treatment and safety. Be sure to make and go to all appointments, and call your doctor if you are having problems. It's also a good idea to know your test results and keep a list of the medicines you take. What happens before the procedure?   Preparing for the procedure    · Understand exactly what procedure is planned, along with the risks, benefits, and other options. · Tell your doctors ALL the medicines, vitamins, supplements, and herbal remedies you take. Some of these can increase the risk of bleeding or interact with anesthesia.     · If you take blood thinners, such as warfarin (Coumadin), clopidogrel (Plavix), or aspirin, be sure to talk to your doctor. He or she will tell you if you should stop taking these medicines before your procedure. Make sure that you understand exactly what your doctor wants you to do.     · Your doctor will tell you which medicines to take or stop before your procedure. You may need to stop taking certain medicines a week or more before the procedure. So talk to your doctor as soon as you can.     · If you have an advance directive, let your doctor know.  It may include a living will and a durable power of  for health care. Bring a copy to the hospital. If you don't have one, you may want to prepare one. It lets your doctor and loved ones know your health care wishes. Doctors advise that everyone prepare these papers before any type of surgery or procedure. Procedures can be stressful. This information will help you understand what you can expect. And it will help you safely prepare for your procedure. What happens on the day of the procedure? · Follow the instructions exactly about when to stop eating and drinking. If you don't, your procedure may be canceled. If your doctor told you to take your medicines on the day of the procedure, take them with only a sip of water.     · Take a bath or shower before you come in for your procedure. Do not apply lotions, perfumes, deodorants, or nail polish.     · Take off all jewelry and piercings. And take out contact lenses, if you wear them.    At the hospital or surgery center   · Bring a picture ID.     · The test may take 15 to 30 minutes.     · The doctor may spray medicine on the back of your throat to numb it. You also will get medicine to prevent pain and to relax you.     · You will lie on your left side. The doctor will put the scope in your mouth and toward the back of your throat. The doctor will tell you when to swallow. This helps the scope move down your throat. You will be able to breathe normally. The doctor will move the scope down your esophagus into your stomach. The doctor also may look at the duodenum.     · If your doctor wants to take a sample of tissue for a biopsy, he or she may use small surgical tools, which are put into the scope, to cut off some tissue. You will not feel a biopsy, if one is taken. The doctor also can use the tools to stop bleeding or to do other treatments, if needed.     · You will stay at the hospital or surgery center for 1 to 2 hours until the medicine you were given wears off.    Going home   · Be sure you have Evening: Begin drinking prep at 2:00pm. Drink an 8 ounce glass every 10 minutes. It is  best to drink the whole glass rapidly rather than sipping small amounts. Continue  drinking until the bottle is empty. Bowel movements should begin approximately one(1) hour after the first glass of prep. They will continue periodically one (1) to two (2) hours after drinking the first glass. If you experience bloating, cramping or nausea set the prep aside for 30-40 minutes, the start again. This is temporary and will disappear once bowel movements begin. AMBULATORY SURGERY INSTRUCTIONS    - If you should develop a cold, sore throat, cough, fever or other new indication of illness prior to the scheduled surgery, please notify the [address] [place] [address] office as early as possible. - DO NOT EAT OR DRINK ANYTHING AFTER MIDNIGHT THE NIGHT BEFORE YOUR SURGERY. This includes water, tea, juice, cereal, coffee, etc.    - Refrain from smoking the day of your surgery or procedure. - Wear simple loose clothing, which can be easily changed. - Do not wear any make-up, lipstick or nail polish. - Please leave contact lenses at home or bring container for them. - Leave jewelry (including rings) and other valuables at home. - Make arrangements for a family member or friend to drive you to and from the surgery center. The anesthetic may affect your judgement following surgery and driving a vehicle within 24 hours after the anesthesia could be dangerous. Please remember that a responsible adult must remain in the building at all times during your surgery. - Children should be accompanied by two adults; one to watch the child, the other to drive the vehicle home.      - Please shower or bathe both on the evening prior to surgery and on the morning of surgery using an antibacterial soap/Hibiclens    - You may be asked to sign several forms prior to surgery; patients under age 25 have a parent or legal guardian sign the permit to operate.    - DO NOT take aspirin, Aleve, Motrin, Ibuprofen, Naproxen, Vitamins or Herbal supplements for 1 weeks or 5 days prior to the day of surgery.    -The morning of your procedure please take blood pressure, heart, and seizure medications with a small sip of water. [facility]  ________________________________                                     Davideisa Hopkins will be notified 1-3 business days before the procedure of arrival time by the surgery center.

## 2019-05-24 ENCOUNTER — TELEPHONE (OUTPATIENT)
Dept: SURGERY | Age: 64
End: 2019-05-24

## 2019-07-19 ENCOUNTER — TELEPHONE (OUTPATIENT)
Dept: FAMILY MEDICINE CLINIC | Age: 64
End: 2019-07-19

## 2019-08-01 ENCOUNTER — OFFICE VISIT (OUTPATIENT)
Dept: SURGERY | Age: 64
End: 2019-08-01
Payer: MEDICARE

## 2019-08-01 VITALS
SYSTOLIC BLOOD PRESSURE: 156 MMHG | HEIGHT: 62 IN | BODY MASS INDEX: 36.16 KG/M2 | WEIGHT: 196.5 LBS | HEART RATE: 106 BPM | DIASTOLIC BLOOD PRESSURE: 75 MMHG | TEMPERATURE: 99.1 F

## 2019-08-01 DIAGNOSIS — R10.13 ABDOMINAL PAIN, EPIGASTRIC: Primary | ICD-10-CM

## 2019-08-01 DIAGNOSIS — E11.8 TYPE 2 DIABETES MELLITUS WITH COMPLICATION, WITHOUT LONG-TERM CURRENT USE OF INSULIN (HCC): ICD-10-CM

## 2019-08-01 PROCEDURE — 1036F TOBACCO NON-USER: CPT | Performed by: SURGERY

## 2019-08-01 PROCEDURE — 3045F PR MOST RECENT HEMOGLOBIN A1C LEVEL 7.0-9.0%: CPT | Performed by: SURGERY

## 2019-08-01 PROCEDURE — G8400 PT W/DXA NO RESULTS DOC: HCPCS | Performed by: SURGERY

## 2019-08-01 PROCEDURE — 3017F COLORECTAL CA SCREEN DOC REV: CPT | Performed by: SURGERY

## 2019-08-01 PROCEDURE — G8427 DOCREV CUR MEDS BY ELIG CLIN: HCPCS | Performed by: SURGERY

## 2019-08-01 PROCEDURE — 2022F DILAT RTA XM EVC RTNOPTHY: CPT | Performed by: SURGERY

## 2019-08-01 PROCEDURE — G8417 CALC BMI ABV UP PARAM F/U: HCPCS | Performed by: SURGERY

## 2019-08-01 PROCEDURE — 1090F PRES/ABSN URINE INCON ASSESS: CPT | Performed by: SURGERY

## 2019-08-01 PROCEDURE — 99213 OFFICE O/P EST LOW 20 MIN: CPT | Performed by: SURGERY

## 2019-08-01 PROCEDURE — 1123F ACP DISCUSS/DSCN MKR DOCD: CPT | Performed by: SURGERY

## 2019-08-01 PROCEDURE — 4040F PNEUMOC VAC/ADMIN/RCVD: CPT | Performed by: SURGERY

## 2019-08-05 LAB
AGE FOR GFR: 65
ALT SERPL-CCNC: 29 UNITS/L (ref 9–52)
ANION GAP SERPL CALCULATED.3IONS-SCNC: 16 MMOL/L
AST SERPL-CCNC: 22 UNITS/L (ref 14–36)
BUN BLDV-MCNC: 18 MG/DL (ref 7–17)
CALCIUM SERPL-MCNC: 10.5 MG/DL (ref 8.4–10.2)
CHLORIDE BLD-SCNC: 105 MMOL/L (ref 98–120)
CHOLESTEROL/HDL RATIO: 5.2 RATIO (ref 0–4.5)
CHOLESTEROL: 272 MG/DL (ref 50–200)
CO2: 26 MMOL/L (ref 22–31)
CREAT SERPL-MCNC: 0.7 MG/DL (ref 0.5–1)
EGFR BF: 102 ML/MIN/1.73 M2
EGFR BM: 137 ML/MIN/1.73 M2
EGFR WF: 84 ML/MIN/1.73 M2
EGFR WM: 113 ML/MIN/1.73 M2
GLUCOSE: 151 MG/DL (ref 65–105)
HDL, DIRECT: 52 MG/DL (ref 36–68)
LDL CHOLESTEROL CALCULATED: 177.4 MG/DL (ref 0–160)
MAGNESIUM: 1.6 MG/DL (ref 1.6–2.3)
POTASSIUM SERPL-SCNC: 4.2 MMOL/L (ref 3.6–5)
SODIUM BLD-SCNC: 143 MMOL/L (ref 135–145)
TRIGL SERPL-MCNC: 213 MG/DL (ref 10–250)
URIC ACID: 7.9 MG/DL (ref 3.5–8.5)
VITAMIN B-12: 557 PG/ML (ref 239–931)
VLDLC SERPL CALC-MCNC: 43 MG/DL (ref 0–40)

## 2019-08-09 ENCOUNTER — TELEPHONE (OUTPATIENT)
Dept: SURGERY | Age: 64
End: 2019-08-09

## 2019-08-09 NOTE — TELEPHONE ENCOUNTER
Spoke with patient regarding the results of the HIDA scan. Patient stated she did not want to have the gastric emptying study done, because she believes it is not necessary. Upon speaking to Dr. Yuki Gottlieb regarding this, he stated he will proceed with lap lucia with the understanding that this may not resolve her symptoms. I spoke to Jeanenne Skiff regarding this, she stated she would like to proceed with the lap lucia understanding that the surgery does not mean it will resolve her symptoms. She stated she has abdominal pain right where the gallbladder is located, which as increased recently. She also stated nothing a surgeon does will fix her \"Diabetes problem\", and she just wanted the pain above the stomach to be stopped. I have once again explained to patient, the surgery does not guarantee it will resolve her pain above her stomach. Patient has decided she would like to proceed with surgery. I have gone over the ambulatory surgery instruction with patient, and am mailing a written out instructions also to her home. She is scheduled for lap lucia at [address] on 8/16/19.

## 2019-08-19 LAB — PATHOLOGY REPORT: NORMAL

## 2019-08-29 ENCOUNTER — OFFICE VISIT (OUTPATIENT)
Dept: SURGERY | Age: 64
End: 2019-08-29

## 2019-08-29 VITALS
DIASTOLIC BLOOD PRESSURE: 84 MMHG | BODY MASS INDEX: 35.88 KG/M2 | HEART RATE: 97 BPM | WEIGHT: 195 LBS | TEMPERATURE: 99.1 F | SYSTOLIC BLOOD PRESSURE: 131 MMHG | HEIGHT: 62 IN

## 2019-08-29 DIAGNOSIS — Z90.49 STATUS POST LAPAROSCOPIC CHOLECYSTECTOMY: ICD-10-CM

## 2019-08-29 DIAGNOSIS — R10.13 ABDOMINAL PAIN, EPIGASTRIC: Primary | ICD-10-CM

## 2019-08-29 DIAGNOSIS — K82.8 BILIARY DYSKINESIA: ICD-10-CM

## 2019-08-29 PROCEDURE — 99024 POSTOP FOLLOW-UP VISIT: CPT | Performed by: SURGERY

## 2019-10-04 ENCOUNTER — TELEPHONE (OUTPATIENT)
Dept: SURGERY | Age: 64
End: 2019-10-04

## 2019-10-16 ENCOUNTER — OFFICE VISIT (OUTPATIENT)
Dept: FAMILY MEDICINE CLINIC | Age: 64
End: 2019-10-16
Payer: MEDICARE

## 2019-10-16 VITALS
HEART RATE: 112 BPM | OXYGEN SATURATION: 98 % | WEIGHT: 194.3 LBS | SYSTOLIC BLOOD PRESSURE: 158 MMHG | DIASTOLIC BLOOD PRESSURE: 96 MMHG | BODY MASS INDEX: 35.54 KG/M2

## 2019-10-16 DIAGNOSIS — Z12.31 ENCOUNTER FOR SCREENING MAMMOGRAM FOR BREAST CANCER: ICD-10-CM

## 2019-10-16 DIAGNOSIS — Z00.00 ROUTINE GENERAL MEDICAL EXAMINATION AT A HEALTH CARE FACILITY: ICD-10-CM

## 2019-10-16 DIAGNOSIS — E78.2 MIXED HYPERLIPIDEMIA: ICD-10-CM

## 2019-10-16 DIAGNOSIS — Z23 NEED FOR PROPHYLACTIC VACCINATION AGAINST STREPTOCOCCUS PNEUMONIAE (PNEUMOCOCCUS): ICD-10-CM

## 2019-10-16 DIAGNOSIS — Z23 NEED FOR PROPHYLACTIC VACCINATION AGAINST DIPHTHERIA-TETANUS-PERTUSSIS (DTP): ICD-10-CM

## 2019-10-16 DIAGNOSIS — Z11.4 SCREENING FOR HIV WITHOUT PRESENCE OF RISK FACTORS: ICD-10-CM

## 2019-10-16 DIAGNOSIS — Z23 NEED FOR INFLUENZA VACCINATION: ICD-10-CM

## 2019-10-16 DIAGNOSIS — Z78.0 ASYMPTOMATIC MENOPAUSAL STATE: ICD-10-CM

## 2019-10-16 DIAGNOSIS — E11.8 TYPE 2 DIABETES MELLITUS WITH COMPLICATION, WITHOUT LONG-TERM CURRENT USE OF INSULIN (HCC): ICD-10-CM

## 2019-10-16 DIAGNOSIS — Z23 NEED FOR PROPHYLACTIC VACCINATION AND INOCULATION AGAINST VARICELLA: ICD-10-CM

## 2019-10-16 DIAGNOSIS — I10 HYPERTENSION, ESSENTIAL: ICD-10-CM

## 2019-10-16 PROCEDURE — G8400 PT W/DXA NO RESULTS DOC: HCPCS | Performed by: FAMILY MEDICINE

## 2019-10-16 PROCEDURE — G0009 ADMIN PNEUMOCOCCAL VACCINE: HCPCS | Performed by: FAMILY MEDICINE

## 2019-10-16 PROCEDURE — 2022F DILAT RTA XM EVC RTNOPTHY: CPT | Performed by: FAMILY MEDICINE

## 2019-10-16 PROCEDURE — 1036F TOBACCO NON-USER: CPT | Performed by: FAMILY MEDICINE

## 2019-10-16 PROCEDURE — G8482 FLU IMMUNIZE ORDER/ADMIN: HCPCS | Performed by: FAMILY MEDICINE

## 2019-10-16 PROCEDURE — 90670 PCV13 VACCINE IM: CPT | Performed by: FAMILY MEDICINE

## 2019-10-16 PROCEDURE — G8417 CALC BMI ABV UP PARAM F/U: HCPCS | Performed by: FAMILY MEDICINE

## 2019-10-16 PROCEDURE — 3044F HG A1C LEVEL LT 7.0%: CPT | Performed by: FAMILY MEDICINE

## 2019-10-16 PROCEDURE — G0402 INITIAL PREVENTIVE EXAM: HCPCS | Performed by: FAMILY MEDICINE

## 2019-10-16 PROCEDURE — 3017F COLORECTAL CA SCREEN DOC REV: CPT | Performed by: FAMILY MEDICINE

## 2019-10-16 PROCEDURE — 99213 OFFICE O/P EST LOW 20 MIN: CPT | Performed by: FAMILY MEDICINE

## 2019-10-16 PROCEDURE — 83036 HEMOGLOBIN GLYCOSYLATED A1C: CPT | Performed by: FAMILY MEDICINE

## 2019-10-16 PROCEDURE — G8427 DOCREV CUR MEDS BY ELIG CLIN: HCPCS | Performed by: FAMILY MEDICINE

## 2019-10-16 PROCEDURE — 1123F ACP DISCUSS/DSCN MKR DOCD: CPT | Performed by: FAMILY MEDICINE

## 2019-10-16 PROCEDURE — 90662 IIV NO PRSV INCREASED AG IM: CPT | Performed by: FAMILY MEDICINE

## 2019-10-16 PROCEDURE — 4040F PNEUMOC VAC/ADMIN/RCVD: CPT | Performed by: FAMILY MEDICINE

## 2019-10-16 PROCEDURE — 1090F PRES/ABSN URINE INCON ASSESS: CPT | Performed by: FAMILY MEDICINE

## 2019-10-16 PROCEDURE — G0008 ADMIN INFLUENZA VIRUS VAC: HCPCS | Performed by: FAMILY MEDICINE

## 2019-10-16 RX ORDER — HYDROCHLOROTHIAZIDE 25 MG/1
TABLET ORAL
Qty: 90 TABLET | Refills: 3 | Status: SHIPPED | OUTPATIENT
Start: 2019-10-16 | End: 2020-10-24 | Stop reason: SDUPTHER

## 2019-10-16 RX ORDER — POLYETHYLENE GLYCOL-3350 AND ELECTROLYTES 236; 6.74; 5.86; 2.97; 22.74 G/274.31G; G/274.31G; G/274.31G; G/274.31G; G/274.31G
POWDER, FOR SOLUTION ORAL
COMMUNITY
Start: 2019-08-29 | End: 2020-02-24 | Stop reason: ALTCHOICE

## 2019-10-16 RX ORDER — METOPROLOL SUCCINATE 25 MG/1
25 TABLET, EXTENDED RELEASE ORAL DAILY
Qty: 90 TABLET | Refills: 1 | Status: SHIPPED | OUTPATIENT
Start: 2019-10-16 | End: 2020-02-24

## 2019-10-16 RX ORDER — OMEPRAZOLE 20 MG/1
20 CAPSULE, DELAYED RELEASE ORAL DAILY
Qty: 90 CAPSULE | Refills: 3 | Status: SHIPPED | OUTPATIENT
Start: 2019-10-16 | End: 2020-10-24 | Stop reason: SDUPTHER

## 2019-10-16 RX ORDER — GLIMEPIRIDE 2 MG/1
2 TABLET ORAL
Qty: 90 TABLET | Refills: 3 | Status: SHIPPED | OUTPATIENT
Start: 2019-10-16 | End: 2020-10-24 | Stop reason: SDUPTHER

## 2019-10-16 RX ORDER — AMLODIPINE BESYLATE 10 MG/1
10 TABLET ORAL DAILY
Qty: 90 TABLET | Refills: 3 | Status: SHIPPED | OUTPATIENT
Start: 2019-10-16 | End: 2020-10-24 | Stop reason: SDUPTHER

## 2019-10-16 RX ORDER — ATORVASTATIN CALCIUM 20 MG/1
20 TABLET, FILM COATED ORAL DAILY
Qty: 90 TABLET | Refills: 3 | Status: SHIPPED | OUTPATIENT
Start: 2019-10-16 | End: 2020-02-24

## 2019-10-16 ASSESSMENT — PATIENT HEALTH QUESTIONNAIRE - PHQ9
SUM OF ALL RESPONSES TO PHQ QUESTIONS 1-9: 0
SUM OF ALL RESPONSES TO PHQ QUESTIONS 1-9: 0

## 2019-10-16 ASSESSMENT — LIFESTYLE VARIABLES: HOW OFTEN DO YOU HAVE A DRINK CONTAINING ALCOHOL: 0

## 2019-10-17 LAB — HBA1C MFR BLD: 6.2 %

## 2020-02-24 ENCOUNTER — OFFICE VISIT (OUTPATIENT)
Dept: FAMILY MEDICINE CLINIC | Age: 65
End: 2020-02-24
Payer: MEDICARE

## 2020-02-24 VITALS
HEART RATE: 118 BPM | DIASTOLIC BLOOD PRESSURE: 80 MMHG | SYSTOLIC BLOOD PRESSURE: 132 MMHG | BODY MASS INDEX: 36.1 KG/M2 | WEIGHT: 197.4 LBS | OXYGEN SATURATION: 98 %

## 2020-02-24 PROCEDURE — G8482 FLU IMMUNIZE ORDER/ADMIN: HCPCS | Performed by: FAMILY MEDICINE

## 2020-02-24 PROCEDURE — 3046F HEMOGLOBIN A1C LEVEL >9.0%: CPT | Performed by: FAMILY MEDICINE

## 2020-02-24 PROCEDURE — 3017F COLORECTAL CA SCREEN DOC REV: CPT | Performed by: FAMILY MEDICINE

## 2020-02-24 PROCEDURE — G8417 CALC BMI ABV UP PARAM F/U: HCPCS | Performed by: FAMILY MEDICINE

## 2020-02-24 PROCEDURE — 4040F PNEUMOC VAC/ADMIN/RCVD: CPT | Performed by: FAMILY MEDICINE

## 2020-02-24 PROCEDURE — G8427 DOCREV CUR MEDS BY ELIG CLIN: HCPCS | Performed by: FAMILY MEDICINE

## 2020-02-24 PROCEDURE — 99214 OFFICE O/P EST MOD 30 MIN: CPT | Performed by: FAMILY MEDICINE

## 2020-02-24 PROCEDURE — 1090F PRES/ABSN URINE INCON ASSESS: CPT | Performed by: FAMILY MEDICINE

## 2020-02-24 PROCEDURE — 2022F DILAT RTA XM EVC RTNOPTHY: CPT | Performed by: FAMILY MEDICINE

## 2020-02-24 PROCEDURE — G8400 PT W/DXA NO RESULTS DOC: HCPCS | Performed by: FAMILY MEDICINE

## 2020-02-24 PROCEDURE — 1123F ACP DISCUSS/DSCN MKR DOCD: CPT | Performed by: FAMILY MEDICINE

## 2020-02-24 PROCEDURE — 99212 OFFICE O/P EST SF 10 MIN: CPT

## 2020-02-24 PROCEDURE — 1036F TOBACCO NON-USER: CPT | Performed by: FAMILY MEDICINE

## 2020-02-24 RX ORDER — METOPROLOL SUCCINATE 25 MG/1
12.5 TABLET, EXTENDED RELEASE ORAL DAILY
Qty: 90 TABLET | Refills: 1
Start: 2020-02-24 | End: 2020-03-11 | Stop reason: SDUPTHER

## 2020-02-24 RX ORDER — ATORVASTATIN CALCIUM 20 MG/1
10 TABLET, FILM COATED ORAL DAILY
Qty: 90 TABLET | Refills: 3
Start: 2020-02-24 | End: 2020-03-11 | Stop reason: ALTCHOICE

## 2020-02-24 RX ORDER — CIPROFLOXACIN 500 MG/1
500 TABLET, FILM COATED ORAL 2 TIMES DAILY
Qty: 20 TABLET | Refills: 0 | Status: SHIPPED | OUTPATIENT
Start: 2020-02-24 | End: 2020-03-05

## 2020-02-24 RX ORDER — CLINDAMYCIN HYDROCHLORIDE 300 MG/1
300 CAPSULE ORAL 3 TIMES DAILY
Qty: 21 CAPSULE | Refills: 0 | Status: SHIPPED | OUTPATIENT
Start: 2020-02-24 | End: 2020-03-02

## 2020-02-24 ASSESSMENT — PATIENT HEALTH QUESTIONNAIRE - PHQ9
SUM OF ALL RESPONSES TO PHQ QUESTIONS 1-9: 1
1. LITTLE INTEREST OR PLEASURE IN DOING THINGS: 0
2. FEELING DOWN, DEPRESSED OR HOPELESS: 1
SUM OF ALL RESPONSES TO PHQ QUESTIONS 1-9: 1
SUM OF ALL RESPONSES TO PHQ9 QUESTIONS 1 & 2: 1

## 2020-02-24 ASSESSMENT — ENCOUNTER SYMPTOMS
COUGH: 0
VOMITING: 1
ABDOMINAL PAIN: 1
DIARRHEA: 1

## 2020-02-24 NOTE — PROGRESS NOTES
[address] [address] [address]  Dept: [phone]  Dept DAVID:[phone]    Demetrice Raymond is a 71 y.o. female who presents today for her medical conditions/complaints as notedbelow. Demetrice Raymond is c/o of Emesis (nausea is more frequent. vomiting but not everyday. still has zofran from gallbladder has continued to use that. right sided abdominal pain sometimes radiates to left side. i just dont feel good. ); Diarrhea (off and on for a month); and Other (cut atorvastatin and metoprolol in half. due to side effects. )      HPI:     Had her gallbladder out and had some diarrhea after this and then thought that it was getting better (8/2019) and then had a colonoscopy in 10/2019 and had some pain after that (4 biopsies) and did finally feel like she was back to herself. Would have more high fiber but in the last     Emesis    This is a recurrent problem. Associated symptoms include abdominal pain, chills, diarrhea (no blood but alot of cramps, usually has diarrhea about 1 hour after she eats, with clear mucous), dizziness, a fever, myalgias (better with the lower dose of the atorvastatin) and sweats. Pertinent negatives include no arthralgias, coughing, URI or weight loss. She has tried diet change and increased fluids for the symptoms. The treatment provided no relief. Diarrhea    Associated symptoms include abdominal pain, chills, a fever, myalgias (better with the lower dose of the atorvastatin), sweats and vomiting. Pertinent negatives include no arthralgias, coughing, URI or weight loss. Other   Associated symptoms include abdominal pain, chills, a fever, myalgias (better with the lower dose of the atorvastatin) and vomiting. Pertinent negatives include no arthralgias or coughing.          BP Readings from Last 3 Encounters:   02/24/20 132/80   10/16/19 (!) 158/96   08/29/19 131/84          (goal 120/80)    Wt Readings from Last 3 Encounters:   02/24/20 197 lb 6.4 oz (89.5 kg)   10/16/19 194 lb 4.8 oz (88.1 kg)   08/29/19 195 lb (88.5 kg)        Past Medical History:   Diagnosis Date    Bone spur of foot     bilateral    Breast lump     left breast lump - benign (removed)    Diverticulitis of sigmoid colon 2001    Hyperlipidemia     Hypertension     Type 2 diabetes mellitus without complication ([facility] [address]       Past Surgical History:   Procedure Laterality Date    APPENDECTOMY      CERVICAL FUSION      C3-C5    CHOLECYSTECTOMY  08/16/2019    COLON SURGERY N/A 10/24/2001    Sigmoid colectomy for acute and chronic diverticulitis with obstruction    COLONOSCOPY  2003    diverticulitis    COLONOSCOPY  10/18/2019    FOOT SURGERY Bilateral     bone spurs in the feet    HYSTERECTOMY VAGINAL      NECK SURGERY         Family History   Problem Relation Age of Onset    Breast Cancer Mother     Diabetes Mother     Other Mother         diverticulitis (bowel resection)    Cancer Father         prostate, squamous cell Ca    Other Maternal Grandfather         diverticulitis    Heart Attack Maternal Grandmother     Cancer Paternal Grandmother     Cancer Paternal Grandfather     Breast Cancer Sister     Breast Cancer Sister        Social History     Tobacco Use    Smoking status: Never Smoker    Smokeless tobacco: Never Used   Substance Use Topics    Alcohol use: No      Current Outpatient Medications   Medication Sig Dispense Refill    metoprolol succinate (TOPROL XL) 25 MG extended release tablet Take 0.5 tablets by mouth daily 90 tablet 1    atorvastatin (LIPITOR) 20 MG tablet Take 0.5 tablets by mouth daily 90 tablet 3    ciprofloxacin (CIPRO) 500 MG tablet Take 1 tablet by mouth 2 times daily for 10 days 20 tablet 0    clindamycin (CLEOCIN) 300 MG capsule Take 1 capsule by mouth 3 times daily for 7 days 21 capsule 0    glimepiride (AMARYL) 2 MG tablet Take 1 tablet by mouth every morning (before breakfast) 90 tablet 3    metFORMIN (GLUCOPHAGE) 500 MG tablet TAKE 2 TABLETS BY MOUTH TWICE DAILY 360 tablet 3    hydrochlorothiazide (HYDRODIURIL) 25 MG tablet TAKE 1 TABLET BY MOUTH ONCE DAILY 90 tablet 3    amLODIPine (NORVASC) 10 MG tablet Take 1 tablet by mouth daily 90 tablet 3    omeprazole (PRILOSEC) 20 MG delayed release capsule Take 1 capsule by mouth daily 90 capsule 3    ondansetron (ZOFRAN ODT) 4 MG disintegrating tablet Take 1 tablet by mouth every 8 hours as needed for Nausea or Vomiting 20 tablet 0     No current facility-administered medications for this visit. Allergies   Allergen Reactions    Sulfa Antibiotics Hives    Metronidazole Nausea And Vomiting       Health Maintenance   Topic Date Due    Diabetic retinal exam  05/02/1964    DTaP/Tdap/Td vaccine (1 - Tdap) 05/02/1965    HIV screen  05/02/1969    Hepatitis B vaccine (1 of 3 - Risk 3-dose series) 05/02/1973    Shingles Vaccine (1 of 2) 05/02/2004    Diabetic microalbuminuria test  03/09/2019    Diabetic foot exam  05/15/2020    Lipid screen  08/05/2020    Annual Wellness Visit (AWV)  10/16/2020    Pneumococcal 65+ years Vaccine (2 of 2 - PPSV23) 10/16/2020    A1C test (Diabetic or Prediabetic)  10/17/2020    Breast cancer screen  10/23/2020    Potassium monitoring  02/25/2021    Creatinine monitoring  02/25/2021    Colon cancer screen colonoscopy  10/18/2029    DEXA (modify frequency per FRAX score)  Completed    Flu vaccine  Completed    Hepatitis C screen  Completed    Hepatitis A vaccine  Aged Out    Hib vaccine  Aged Out    Meningococcal (ACWY) vaccine  Aged Out       Subjective:      Review of Systems   Constitutional: Positive for chills and fever. Negative for weight loss. Respiratory: Negative for cough. Gastrointestinal: Positive for abdominal pain, diarrhea (no blood but alot of cramps, usually has diarrhea about 1 hour after she eats, with clear mucous) and vomiting.    Musculoskeletal: Positive for myalgias (better Right lower quadrant abdominal pain  ciprofloxacin (CIPRO) 500 MG tablet    clindamycin (CLEOCIN) 300 MG capsule    CT ABDOMEN PELVIS W IV CONTRAST Additional Contrast? Oral     Stop the metformin until the bowels are back to normal for a couple of weeks. Hold on the atorvastatin until off of the antibiotics  Continue on the atorvastatin and the metoprolol at the current lower doses. Clinically appears to have diverticulitis flare. Would recommend antibiotics, bowel rest for the first 24 hours. Check CT scan to isolate area with her history of recurrent issues. Lab Results   Component Value Date    WBC 12.2 (H) 02/25/2020    HGB 14.2 02/25/2020    HCT 43.3 02/25/2020    .4 02/25/2020    CHOL 272 (H) 08/05/2019    TRIG 213 08/05/2019    ALT 29 08/05/2019    AST 22 08/05/2019     02/25/2020    K 3.7 02/25/2020     02/25/2020    CREATININE 0.8 02/25/2020    BUN 18 (H) 02/25/2020    CO2 29 02/25/2020    LABA1C 6.2 10/17/2019    LABA1C 8.1 05/15/2019    LABA1C 7.1 03/02/2018    LABMICR 10.8 (H) 03/09/2018       Return in about 1 week (around 3/2/2020). Patient given educational materials - see patientinstructions. Discussed use, benefit, and side effects of prescribed medications. All patient questions answered. Pt voiced understanding. Reviewed health maintenance. Instructed to continue current medications, diet andexercise. Patient agreed with treatment plan. Follow up as directed.      Electronically signed by Mela Hearn MD on 3/1/2020

## 2020-02-25 LAB
ANION GAP SERPL CALCULATED.3IONS-SCNC: 8.7 MMOL/L
BASOPHILS %: 1 (ref 0–3)
BASOPHILS ABSOLUTE: 0.12 (ref 0–0.3)
BUN BLDV-MCNC: 18 MG/DL (ref 7–17)
CALCIUM SERPL-MCNC: 9.5 MG/DL (ref 8.4–10.2)
CHLORIDE BLD-SCNC: 102 MMOL/L (ref 98–120)
CO2: 29 MMOL/L (ref 22–31)
CREAT SERPL-MCNC: 0.8 MG/DL (ref 0.5–1)
EOSINOPHILS %: 2.8 (ref 0–10)
EOSINOPHILS ABSOLUTE: 0.34 (ref 0–1.1)
GFR CALCULATED: > 60
GLUCOSE: 301 MG/DL (ref 65–105)
HCT VFR BLD CALC: 43.3 % (ref 37–47)
HEMOGLOBIN: 14.2 (ref 12–16)
LYMPHOCYTE %: 17.03 (ref 20–51.1)
LYMPHOCYTES ABSOLUTE: 2.07 (ref 1–5.5)
MCH RBC QN AUTO: 28.8 PG (ref 28.5–32.5)
MCHC RBC AUTO-ENTMCNC: 32.8 G/DL (ref 32–37)
MCV RBC AUTO: 87.8 FL (ref 80–94)
MONOCYTES %: 7.92 (ref 1.7–9.3)
MONOCYTES ABSOLUTE: 0.96 (ref 0.1–1)
NEUTROPHILS %: 71.27 (ref 42.2–75.2)
NEUTROPHILS ABSOLUTE: 8.66 (ref 2–8.1)
PDW BLD-RTO: 11.7 % (ref 8.5–15.5)
PLATELET # BLD: 365.4 THOU/MM3 (ref 130–400)
POTASSIUM SERPL-SCNC: 3.7 MMOL/L (ref 3.6–5)
RBC: 4.93 M/UL (ref 4.2–5.4)
SODIUM BLD-SCNC: 139 MMOL/L (ref 135–145)
WBC: 12.2 THOU/ML3 (ref 4.8–10.8)

## 2020-03-02 ENCOUNTER — OFFICE VISIT (OUTPATIENT)
Dept: FAMILY MEDICINE CLINIC | Age: 65
End: 2020-03-02
Payer: MEDICARE

## 2020-03-02 VITALS
HEART RATE: 128 BPM | DIASTOLIC BLOOD PRESSURE: 96 MMHG | OXYGEN SATURATION: 98 % | TEMPERATURE: 100.1 F | SYSTOLIC BLOOD PRESSURE: 160 MMHG

## 2020-03-02 LAB
ALBUMIN/GLOBULIN RATIO: 1.2 G/DL
ALBUMIN: 4.4 G/DL (ref 3.5–5)
ALP BLD-CCNC: 160 UNITS/L (ref 38–126)
ALT SERPL-CCNC: 25 UNITS/L (ref 9–52)
ANION GAP SERPL CALCULATED.3IONS-SCNC: 12.5 MMOL/L
AST SERPL-CCNC: 17 UNITS/L (ref 14–36)
BACTERIA, URINE: ABNORMAL
BASOPHILS %: 1.11 (ref 0–3)
BASOPHILS ABSOLUTE: 0.15 (ref 0–0.3)
BILIRUB SERPL-MCNC: 0.3 MG/DL (ref 0.2–1.3)
BILIRUBIN URINE: NEGATIVE
BLOOD, URINE: NEGATIVE
BUN BLDV-MCNC: 13 MG/DL (ref 7–17)
CALCIUM SERPL-MCNC: 9.9 MG/DL (ref 8.4–10.2)
CASTS UA: ABNORMAL
CHLORIDE BLD-SCNC: 106 MMOL/L (ref 98–120)
CLARITY: CLEAR
CO2: 25 MMOL/L (ref 22–31)
COLOR, URINE: YELLOW
CREAT SERPL-MCNC: 0.8 MG/DL (ref 0.5–1)
CREATININE CLEARANCE: 55.45
CRYSTALS, UA: ABNORMAL
EOSINOPHILS %: 1.86 (ref 0–10)
EOSINOPHILS ABSOLUTE: 0.25 (ref 0–1.1)
GFR CALCULATED: > 60
GLOBULIN: 3.6 G/DL
GLUCOSE URINE: NEGATIVE MG/DL
GLUCOSE: 123 MG/DL (ref 65–105)
HCT VFR BLD CALC: 41.9 % (ref 37–47)
HEMOGLOBIN: 13.7 (ref 12–16)
KETONES, URINE: NEGATIVE MG/DL
LEUKOCYTE ESTERASE, URINE: NEGATIVE
LYMPHOCYTE %: 15.3 (ref 20–51.1)
LYMPHOCYTES ABSOLUTE: 2.08 (ref 1–5.5)
MCH RBC QN AUTO: 28.4 PG (ref 28.5–32.5)
MCHC RBC AUTO-ENTMCNC: 32.7 G/DL (ref 32–37)
MCV RBC AUTO: 87.1 FL (ref 80–94)
MONOCYTES %: 7.23 (ref 1.7–9.3)
MONOCYTES ABSOLUTE: 0.98 (ref 0.1–1)
MUCUS, URINE: ABNORMAL
NEUTROPHILS %: 74.5 (ref 42.2–75.2)
NEUTROPHILS ABSOLUTE: 10.12 (ref 2–8.1)
NITRITE, URINE: NEGATIVE
PDW BLD-RTO: 11.5 % (ref 8.5–15.5)
PH UA: 6 (ref 5–8.5)
PLATELET # BLD: 381.1 THOU/MM3 (ref 130–400)
POTASSIUM SERPL-SCNC: 3.5 MMOL/L (ref 3.6–5)
PROTEIN UA: NEGATIVE MG/DL
RBC URINE: ABNORMAL (ref 0–2)
RBC: 4.81 M/UL (ref 4.2–5.4)
SODIUM BLD-SCNC: 140 MMOL/L (ref 135–145)
SPECIFIC GRAVITY, URINE: 1.01 MG/DL (ref 1–1.03)
SQUAMOUS EPITHELIAL: ABNORMAL
TOTAL PROTEIN, SERUM: 8 G/DL (ref 6.3–8.2)
TRICHOMONAS, URINE: ABNORMAL
UROBILINOGEN, URINE: 0.2 MG/DL (ref 0.2–1)
WBC URINE: ABNORMAL (ref 0–4)
WBC: 13.6 THOU/ML3 (ref 4.8–10.8)
YEAST, URINE: ABNORMAL

## 2020-03-02 PROCEDURE — 99211 OFF/OP EST MAY X REQ PHY/QHP: CPT

## 2020-03-02 NOTE — PROGRESS NOTES
03/04/2020    K 3.2 (L) 03/04/2020     03/04/2020    CREATININE 0.7 03/04/2020    BUN 8 03/04/2020    CO2 23 03/04/2020    LABA1C 6.2 10/17/2019    LABA1C 8.1 05/15/2019    LABA1C 7.1 03/02/2018    LABMICR 10.8 (H) 03/09/2018       Return after hospital d/c. Patient agreed with treatment plan. Follow up as directed.      Electronically signed by Patricia Jaime MD on 3/6/2020

## 2020-03-03 LAB
ANION GAP SERPL CALCULATED.3IONS-SCNC: 10.4 MMOL/L
BASOPHILS %: 0.92 (ref 0–3)
BASOPHILS ABSOLUTE: 0.1 (ref 0–0.3)
BUN BLDV-MCNC: 12 MG/DL (ref 7–17)
CALCIUM SERPL-MCNC: 9.3 MG/DL (ref 8.4–10.2)
CHLORIDE BLD-SCNC: 108 MMOL/L (ref 98–120)
CO2: 25 MMOL/L (ref 22–31)
CREAT SERPL-MCNC: 0.8 MG/DL (ref 0.5–1)
CREATININE CLEARANCE: 55.45
EOSINOPHILS %: 3.69 (ref 0–10)
EOSINOPHILS ABSOLUTE: 0.39 (ref 0–1.1)
GFR CALCULATED: > 60
GLUCOSE: 122 MG/DL (ref 65–105)
HCT VFR BLD CALC: 38.8 % (ref 37–47)
HEMOGLOBIN: 12.9 (ref 12–16)
LYMPHOCYTE %: 21.49 (ref 20–51.1)
LYMPHOCYTES ABSOLUTE: 2.26 (ref 1–5.5)
MCH RBC QN AUTO: 28.6 PG (ref 28.5–32.5)
MCHC RBC AUTO-ENTMCNC: 33.2 G/DL (ref 32–37)
MCV RBC AUTO: 86.3 FL (ref 80–94)
MONOCYTES %: 8.81 (ref 1.7–9.3)
MONOCYTES ABSOLUTE: 0.93 (ref 0.1–1)
NEUTROPHILS %: 65.1 (ref 42.2–75.2)
NEUTROPHILS ABSOLUTE: 6.85 (ref 2–8.1)
PDW BLD-RTO: 11.9 % (ref 8.5–15.5)
PLATELET # BLD: 320.8 THOU/MM3 (ref 130–400)
POTASSIUM SERPL-SCNC: 3.4 MMOL/L (ref 3.6–5)
RBC: 4.5 M/UL (ref 4.2–5.4)
SODIUM BLD-SCNC: 140 MMOL/L (ref 135–145)
WBC: 10.5 THOU/ML3 (ref 4.8–10.8)

## 2020-03-04 LAB
ANION GAP SERPL CALCULATED.3IONS-SCNC: 10.2 MMOL/L
BASOPHILS %: 1.1 (ref 0–3)
BASOPHILS ABSOLUTE: 0.09 (ref 0–0.3)
BUN BLDV-MCNC: 8 MG/DL (ref 7–17)
CALCIUM SERPL-MCNC: 9 MG/DL (ref 8.4–10.2)
CHLORIDE BLD-SCNC: 110 MMOL/L (ref 98–120)
CO2: 23 MMOL/L (ref 22–31)
CREAT SERPL-MCNC: 0.7 MG/DL (ref 0.5–1)
CREATININE CLEARANCE: 63.37
EOSINOPHILS %: 6.53 (ref 0–10)
EOSINOPHILS ABSOLUTE: 0.53 (ref 0–1.1)
GFR CALCULATED: > 60
GLUCOSE: 144 MG/DL (ref 65–105)
HCT VFR BLD CALC: 37.7 % (ref 37–47)
HEMOGLOBIN: 12.4 (ref 12–16)
LYMPHOCYTE %: 19.21 (ref 20–51.1)
LYMPHOCYTES ABSOLUTE: 1.57 (ref 1–5.5)
MCH RBC QN AUTO: 28.4 PG (ref 28.5–32.5)
MCHC RBC AUTO-ENTMCNC: 32.8 G/DL (ref 32–37)
MCV RBC AUTO: 86.7 FL (ref 80–94)
MONOCYTES %: 8.56 (ref 1.7–9.3)
MONOCYTES ABSOLUTE: 0.7 (ref 0.1–1)
NEUTROPHILS %: 64.6 (ref 42.2–75.2)
NEUTROPHILS ABSOLUTE: 5.29 (ref 2–8.1)
PDW BLD-RTO: 11.4 % (ref 8.5–15.5)
PLATELET # BLD: 355.9 THOU/MM3 (ref 130–400)
POTASSIUM SERPL-SCNC: 3.2 MMOL/L (ref 3.6–5)
RBC: 4.35 M/UL (ref 4.2–5.4)
SODIUM BLD-SCNC: 140 MMOL/L (ref 135–145)
WBC: 8.2 THOU/ML3 (ref 4.8–10.8)

## 2020-03-06 ENCOUNTER — TELEPHONE (OUTPATIENT)
Dept: FAMILY MEDICINE CLINIC | Age: 65
End: 2020-03-06

## 2020-03-09 ENCOUNTER — TELEPHONE (OUTPATIENT)
Dept: FAMILY MEDICINE CLINIC | Age: 65
End: 2020-03-09

## 2020-03-11 ENCOUNTER — OFFICE VISIT (OUTPATIENT)
Dept: FAMILY MEDICINE CLINIC | Age: 65
End: 2020-03-11
Payer: MEDICARE

## 2020-03-11 VITALS — OXYGEN SATURATION: 96 % | DIASTOLIC BLOOD PRESSURE: 78 MMHG | SYSTOLIC BLOOD PRESSURE: 114 MMHG | HEART RATE: 110 BPM

## 2020-03-11 PROCEDURE — 99211 OFF/OP EST MAY X REQ PHY/QHP: CPT | Performed by: FAMILY MEDICINE

## 2020-03-11 PROCEDURE — 1111F DSCHRG MED/CURRENT MED MERGE: CPT | Performed by: FAMILY MEDICINE

## 2020-03-11 PROCEDURE — 99495 TRANSJ CARE MGMT MOD F2F 14D: CPT | Performed by: FAMILY MEDICINE

## 2020-03-11 RX ORDER — METOPROLOL SUCCINATE 25 MG/1
12.5 TABLET, EXTENDED RELEASE ORAL DAILY
Qty: 90 TABLET | Refills: 1
Start: 2020-03-11 | End: 2020-09-08 | Stop reason: SDUPTHER

## 2020-03-11 RX ORDER — MONTELUKAST SODIUM 4 MG/1
1 TABLET, CHEWABLE ORAL
COMMUNITY
Start: 2020-03-05 | End: 2020-10-22 | Stop reason: SDUPTHER

## 2020-03-11 RX ORDER — AMOXICILLIN AND CLAVULANATE POTASSIUM 875; 125 MG/1; MG/1
1 TABLET, FILM COATED ORAL 2 TIMES DAILY
Qty: 14 TABLET | Refills: 0 | Status: SHIPPED | OUTPATIENT
Start: 2020-03-11 | End: 2020-10-21

## 2020-03-11 RX ORDER — AMOXICILLIN AND CLAVULANATE POTASSIUM 875; 125 MG/1; MG/1
TABLET, FILM COATED ORAL
COMMUNITY
Start: 2020-03-05 | End: 2020-03-11 | Stop reason: SDUPTHER

## 2020-03-11 NOTE — PROGRESS NOTES
Post-Discharge Transitional Care Management Services or Hospital Follow Up      Vishal Cortez   YOB: 1955    Date of Office Visit:  3/11/2020  Date of Hospital Admission: 3/2/2020  Date of Hospital Discharge: 3/5/2020    Care management risk score Rising risk (score 2-5) and Complex Care (Scores >=6): 1     Non face to face  following discharge, date last encounter closed (first attempt may have been earlier): 3/9/2020 10:30 AM     Call initiated 2 business days of discharge: Yes    Patient Active Problem List   Diagnosis    Type 2 diabetes mellitus with complication, without long-term current use of insulin ([facility] [address]    Hyperlipidemia    Hypertension, essential    Palpitations       Allergies   Allergen Reactions    Sulfa Antibiotics Hives    Metronidazole Nausea And Vomiting       Medications listed as ordered at the time of discharge from [facility]     Medications marked \"taking\" at this time  Outpatient Medications Marked as Taking for the 3/11/20 encounter (Office Visit) with Yamileth Evans MD   Medication Sig Dispense Refill    metFORMIN (GLUCOPHAGE) 500 MG tablet Take 1 tablet by mouth 2 times daily 360 tablet 3    amoxicillin-clavulanate (AUGMENTIN) 875-125 MG per tablet Take 1 tablet by mouth 2 times daily 14 tablet 0    metoprolol succinate (TOPROL XL) 25 MG extended release tablet Take 0.5 tablets by mouth daily 90 tablet 1    glimepiride (AMARYL) 2 MG tablet Take 1 tablet by mouth every morning (before breakfast) 90 tablet 3    hydrochlorothiazide (HYDRODIURIL) 25 MG tablet TAKE 1 TABLET BY MOUTH ONCE DAILY 90 tablet 3    amLODIPine (NORVASC) 10 MG tablet Take 1 tablet by mouth daily 90 tablet 3    omeprazole (PRILOSEC) 20 MG delayed release capsule Take 1 capsule by mouth daily 90 capsule 3    ondansetron (ZOFRAN ODT) 4 MG disintegrating tablet Take 1 tablet by mouth every 8 hours as needed for Nausea or Vomiting 20 tablet 0        Medications patient diabetes mellitus with complication, without long-term current use of insulin (HCC)    - metoprolol succinate (TOPROL XL) 25 MG extended release tablet; Take 0.5 tablets by mouth daily  Dispense: 90 tablet; Refill: 1    4. Hypertension, essential  - metoprolol succinate (TOPROL XL) 25 MG extended release tablet; Take 0.5 tablets by mouth daily  Dispense: 90 tablet; Refill: 1      May try the probiotic such as [employer] colon Health, Align, or Florastor or similar multi-strain probiotic blend for improving the GI symptoms. Hold on the atorvastatin and we will see how this goes and consider restarting in the next few months. Give your bowels a few weeks to recover and gradually restart the metformin watching for diarrhea      Restart the 1/2 tab of the metoprolol daily for the heart rate. HEP for the heel and ankle.     Medical Decision Making: moderate complexity

## 2020-03-11 NOTE — PATIENT INSTRUCTIONS
your heel back up to the level of the step. Repeat 2 to 4 times. Towel curls   1. While sitting, place your foot on a towel on the floor and scrunch the towel toward you with your toes. 2. Then, also using your toes, push the towel away from you. [place] pickups   1. Put marbles on the floor next to a cup.  2. Using your toes, try to lift the marbles up from the floor and put them in the cup. Follow-up care is a key part of your treatment and safety. Be sure to make and go to all appointments, and call your doctor if you are having problems. It's also a good idea to know your test results and keep a list of the medicines you take. Where can you learn more? Go to [email or web address]://[email or web address]. org and sign in to your [employer] account. Enter Z536 in the [employer] box to learn more about \"Plantar Fasciitis: Exercises. \"     If you do not have an account, please click on the \"Sign Up Now\" link. Current as of: June 26, 2019  Content Version: 12.3  © 2215-4182 [employer]. Care instructions adapted under license by [facility] ([facility]). If you have questions about a medical condition or this instruction, always ask your healthcare professional. [address] any warranty or liability for your use of this information.

## 2020-03-13 ENCOUNTER — TELEPHONE (OUTPATIENT)
Dept: FAMILY MEDICINE CLINIC | Age: 65
End: 2020-03-13

## 2020-03-13 NOTE — TELEPHONE ENCOUNTER
Patient called stating she was asked to call office and let Dr Ruma Fernandez know if she refilled the antibiotic that was given to her on Wednesday. She did fill it because when she was discharged from the hospital the antibiotic that was given to her was only for 7 days and she thought she was supposed to be on it for 10 days.

## 2020-09-08 RX ORDER — METOPROLOL SUCCINATE 25 MG/1
12.5 TABLET, EXTENDED RELEASE ORAL DAILY
Qty: 90 TABLET | Refills: 1 | Status: SHIPPED | OUTPATIENT
Start: 2020-09-08 | End: 2020-10-24 | Stop reason: SDUPTHER

## 2020-10-21 ENCOUNTER — OFFICE VISIT (OUTPATIENT)
Dept: FAMILY MEDICINE CLINIC | Age: 65
End: 2020-10-21
Payer: MEDICARE

## 2020-10-21 VITALS
DIASTOLIC BLOOD PRESSURE: 84 MMHG | OXYGEN SATURATION: 97 % | HEART RATE: 93 BPM | BODY MASS INDEX: 37.91 KG/M2 | SYSTOLIC BLOOD PRESSURE: 138 MMHG | WEIGHT: 206 LBS | HEIGHT: 62 IN

## 2020-10-21 PROBLEM — E66.01 MORBIDLY OBESE (HCC): Status: ACTIVE | Noted: 2020-10-21

## 2020-10-21 LAB — HBA1C MFR BLD: 7 %

## 2020-10-21 PROCEDURE — 90732 PPSV23 VACC 2 YRS+ SUBQ/IM: CPT | Performed by: FAMILY MEDICINE

## 2020-10-21 PROCEDURE — 3017F COLORECTAL CA SCREEN DOC REV: CPT | Performed by: FAMILY MEDICINE

## 2020-10-21 PROCEDURE — G8417 CALC BMI ABV UP PARAM F/U: HCPCS | Performed by: FAMILY MEDICINE

## 2020-10-21 PROCEDURE — G8427 DOCREV CUR MEDS BY ELIG CLIN: HCPCS | Performed by: FAMILY MEDICINE

## 2020-10-21 PROCEDURE — 4040F PNEUMOC VAC/ADMIN/RCVD: CPT | Performed by: FAMILY MEDICINE

## 2020-10-21 PROCEDURE — 90694 VACC AIIV4 NO PRSRV 0.5ML IM: CPT | Performed by: FAMILY MEDICINE

## 2020-10-21 PROCEDURE — 99214 OFFICE O/P EST MOD 30 MIN: CPT | Performed by: FAMILY MEDICINE

## 2020-10-21 PROCEDURE — 1036F TOBACCO NON-USER: CPT | Performed by: FAMILY MEDICINE

## 2020-10-21 PROCEDURE — G8484 FLU IMMUNIZE NO ADMIN: HCPCS | Performed by: FAMILY MEDICINE

## 2020-10-21 PROCEDURE — 83036 HEMOGLOBIN GLYCOSYLATED A1C: CPT | Performed by: FAMILY MEDICINE

## 2020-10-21 PROCEDURE — 1123F ACP DISCUSS/DSCN MKR DOCD: CPT | Performed by: FAMILY MEDICINE

## 2020-10-21 PROCEDURE — 99212 OFFICE O/P EST SF 10 MIN: CPT

## 2020-10-21 PROCEDURE — G0438 PPPS, INITIAL VISIT: HCPCS | Performed by: FAMILY MEDICINE

## 2020-10-21 PROCEDURE — 3051F HG A1C>EQUAL 7.0%<8.0%: CPT | Performed by: FAMILY MEDICINE

## 2020-10-21 PROCEDURE — G8400 PT W/DXA NO RESULTS DOC: HCPCS | Performed by: FAMILY MEDICINE

## 2020-10-21 PROCEDURE — 2022F DILAT RTA XM EVC RTNOPTHY: CPT | Performed by: FAMILY MEDICINE

## 2020-10-21 PROCEDURE — 1090F PRES/ABSN URINE INCON ASSESS: CPT | Performed by: FAMILY MEDICINE

## 2020-10-21 ASSESSMENT — PATIENT HEALTH QUESTIONNAIRE - PHQ9
2. FEELING DOWN, DEPRESSED OR HOPELESS: 0
SUM OF ALL RESPONSES TO PHQ QUESTIONS 1-9: 0
1. LITTLE INTEREST OR PLEASURE IN DOING THINGS: 0
SUM OF ALL RESPONSES TO PHQ9 QUESTIONS 1 & 2: 0
SUM OF ALL RESPONSES TO PHQ QUESTIONS 1-9: 0
SUM OF ALL RESPONSES TO PHQ QUESTIONS 1-9: 0

## 2020-10-21 ASSESSMENT — LIFESTYLE VARIABLES: HOW OFTEN DO YOU HAVE A DRINK CONTAINING ALCOHOL: 0

## 2020-10-21 NOTE — PROGRESS NOTES
Have you had an allergic reaction to the flu (influenza) shot? no  Are you allergic to eggs or any component of the flu vaccine? no  Do you have a history of Guillain-[place] Syndrome (GBS), which is paralysis after receiving the flu vaccine? no  Are you feeling well today? yes  Flu vaccine given as ordered. Patient tolerated it well. No questions re: VIS information. ss Medicare Annual Wellness Visit  Name: Rajeev Palacio Date: 10/25/2020   MRN: [id number] Sex: Female   Age: 76 y.o. Ethnicity: Non-/Non    : 1955 Race: Aime Orozcoer is here for Medicare AWV and Dizziness (has been having increased dizziness)    Screenings for behavioral, psychosocial and functional/safety risks, and cognitive dysfunction are all negative except as indicated below. These results, as well as other patient data from the [address] form, are documented in Flowsheets linked to this Encounter. Has had a lot of the itching ears and the feeling of dizziness since. When she turns over in bed and sits up fast will feel a bit off. Maybe a week. Joaquin Meneses is a 76 y.o. female who presents for follow-up of hypertension, diabetes, and hyperlipidemia. She indicates that she is feeling well and denies any symptoms referable to her elevated blood pressure or diabetes. Specifically denies chest pain, palpitations, dyspnea, peripheral edema, thirst, frequent urination, and blurred vision. Current medication regimen is as listed below. She denies any side effects of medication, and has been taking it regularly. Home glucose readings have been OK-150- 199 when she feels bad, 220. Checks blood sugars once daily at the most. Last eye exam was 2020 Andrew Vo. Diabetic complications includenone. shehas been exercising regularly staying active outside. Has notbeen following a diabetic diet.   Does have a cataract and she will be getting this done at a later date    A lot of pain in the left great toe, sometimes cannot even walk with it. Told along time ago it had arthritis. Does still have the times where she feels the palpitations. Usually at night when she sits down at night to relax, then will feel an extra beat maybe. Will sometimes beat so fast it feels like it takes her breath away. It is a few nights out of the week that she has this,  Did try a bendryl and thought maybe related to this. Allergies   Allergen Reactions    Sulfa Antibiotics Hives    Metronidazole Nausea And Vomiting         Prior to Visit Medications    Medication Sig Taking?  Authorizing Provider   metoprolol succinate (TOPROL XL) 25 MG extended release tablet Take 0.5 tablets by mouth daily Yes Alina Perez MD   colestipol (COLESTID) 1 g tablet Take 1 tablet by mouth daily Yes Alina Perez MD   metFORMIN (GLUCOPHAGE) 500 MG tablet Take 1 tablet by mouth 2 times daily Yes Alina Perez MD   glimepiride (AMARYL) 2 MG tablet Take 1 tablet by mouth every morning (before breakfast) Yes Alina Perez MD   hydroCHLOROthiazide (HYDRODIURIL) 25 MG tablet TAKE 1 TABLET BY MOUTH ONCE DAILY Yes Alina Perez MD   amLODIPine (NORVASC) 10 MG tablet Take 1 tablet by mouth daily Yes Alina Perez MD   omeprazole (PRILOSEC) 20 MG delayed release capsule Take 1 capsule by mouth daily Yes Alina Perez MD   ondansetron (ZOFRAN ODT) 4 MG disintegrating tablet Take 1 tablet by mouth every 8 hours as needed for Nausea or Vomiting Yes Alina Perez MD         Past Medical History:   Diagnosis Date    Bone spur of foot     bilateral    Breast lump     left breast lump - benign (removed)    Diverticulitis of sigmoid colon 2001    Hyperlipidemia     Hypertension     Type 2 diabetes mellitus without complication [facility])        Past Surgical History:   Procedure Laterality Date    APPENDECTOMY      CERVICAL FUSION      C3-C5    CHOLECYSTECTOMY  08/16/2019    COLON SURGERY N/A 10/24/2001 Sigmoid colectomy for acute and chronic diverticulitis with obstruction    COLONOSCOPY  2003    diverticulitis    COLONOSCOPY  10/18/2019    FOOT SURGERY Bilateral     bone spurs in the feet    HYSTERECTOMY, TOTAL ABDOMINAL      NECK SURGERY      SALPINGO-OOPHORECTOMY  1990    Bilateral-- seperate from hyst with adhesion lysis         Family History   Problem Relation Age of Onset    Breast Cancer Mother     Diabetes Mother     Other Mother         diverticulitis (bowel resection)    Cancer Father         prostate, squamous cell Ca    Other Maternal Grandfather         diverticulitis    Heart Attack Maternal Grandmother     Cancer Paternal Grandmother     Cancer Paternal Grandfather     Breast Cancer Sister     Breast Cancer Sister        CareTeam (Including outside providers/suppliers regularly involved in providing care):   Patient Care Team:  Dorethea Epley, MD as PCP - Imelda Blue MD as PCP - [facility] Provider    Wt Readings from Last 3 Encounters:   10/21/20 206 lb (93.4 kg)   02/24/20 197 lb 6.4 oz (89.5 kg)   10/16/19 194 lb 4.8 oz (88.1 kg)     Vitals:    10/21/20 1337   BP: 138/84   Site: Left Upper Arm   Position: Sitting   Cuff Size: Large Adult   Pulse: 93   SpO2: 97%   Weight: 206 lb (93.4 kg)   Height: 5' 2\" (1.575 m)     Body mass index is 37.68 kg/m². Based upon direct observation of the patient, evaluation of cognition reveals recent and remote memory intact. Physical Exam  Vitals signs and nursing note reviewed. Constitutional:       Appearance: Normal appearance. She is well-developed. HENT:      Head: Normocephalic and atraumatic. Eyes:      Conjunctiva/sclera: Conjunctivae normal.   Neck:      Musculoskeletal: Normal range of motion and neck supple. Thyroid: No thyromegaly. Vascular: No JVD. Cardiovascular:      Rate and Rhythm: Normal rate and regular rhythm. Heart sounds: Normal heart sounds. No murmur. No friction rub.  No gallop. Pulmonary:      Effort: Pulmonary effort is normal. No respiratory distress. Breath sounds: Normal breath sounds. Abdominal:      Palpations: Abdomen is soft. Musculoskeletal:      Right lower leg: No edema. Left lower leg: No edema. Lymphadenopathy:      Cervical: No cervical adenopathy. Skin:     General: Skin is warm. Neurological:      General: No focal deficit present. Mental Status: She is alert and oriented to person, place, and time. Psychiatric:         Mood and Affect: Mood normal.         Behavior: Behavior normal.         Thought Content: Thought content normal.         Judgment: Judgment normal.         Diabetic Foot Exam    Deformities: Yes  bunion and arthritic change left great toe has significant OA change with Hallux rigiditus  Pulses:  normal,   Edema: normal  Skin lesions: normal  Callous:  No  Nails: normal    Sensory exam  Monofilament Sensation Left Foot: 10/10   Monofilament Sensation Right Foot: 10/10        Diagnosis Orders   1. Routine general medical examination at a health care facility     2. Screening mammogram, encounter for  [facility] VICKY DIGITAL SCREEN BILATERAL   3. Need for influenza vaccination  INFLUENZA, QUADV, ADJUVANTED, 65 YRS =, IM, PF, PREFILL SYR, 0.5ML (FLUAD)   4. Morbidly obese ([address]     5. Type 2 diabetes mellitus with complication, without long-term current use of insulin (HCC)  POCT glycosylated hemoglobin (Hb A1C)    Microalbumin, Ur    [facility] DIABETES FOOT EXAM    metoprolol succinate (TOPROL XL) 25 MG extended release tablet    glimepiride (AMARYL) 2 MG tablet   6. Mixed hyperlipidemia  Comprehensive Metabolic Panel    Lipid Panel   7. Hypertension, essential  Comprehensive Metabolic Panel    metoprolol succinate (TOPROL XL) 25 MG extended release tablet    hydroCHLOROthiazide (HYDRODIURIL) 25 MG tablet    amLODIPine (NORVASC) 10 MG tablet   8.  Adverse effect due to correct medicinal substance, properly given, initial encounter Vitamin B12   9. Need for prophylactic vaccination against Streptococcus pneumoniae (pneumococcus)  Pneumococcal polysaccharide vaccine 23-valent PPSV23   10. Need for prophylactic vaccination against diphtheria-tetanus-pertussis (DTP)  Tdap (ADACEL) 5-2-15.5 LF-MCG/0.5 injection   11. Need for prophylactic vaccination and inoculation against varicella  zoster recombinant adjuvanted vaccine (SHINGRIX) 50 MCG/0.5ML SUSR injection   12. Great toe pain, left  XR FOOT LEFT (2 VIEWS)   13. Tachycardia  08302 - Single/Multiple Event Recorder    T4, Free    TSH without Reflex   14. Dizziness  39273 - Single/Multiple Event Recorder     Can try the Voltaren gel for the joint pain. OTC reviewed the use. Refer to the Dr. Harriet Castañeda if needed for the foot, toe pain. Try the vertigo exercises to see if this helps when you have the vertigo episodes. Try the loratadine plain- no decongestants to avoid bothering the heart. Will check the 2 week event recorder with the \"fast hear beat\" that you are having. Diabetes is well controlled at this time. COntinue with the diet and the exercise. Patient's complete Health Risk Assessment and screening values have been reviewed and are found in Flowsheets. The following problems were reviewed today and where indicated follow up appointments were made and/or referrals ordered. Positive Risk Factor Screenings with Interventions:       General Health and ACP:  General  In general, how would you say your health is?: Good  In the past 7 days, have you experienced any of the following?  New or Increased Pain, New or Increased Fatigue, Loneliness, Social Isolation, Stress or Anger?: None of These  Do you get the social and emotional support that you need?: Yes  Do you have a Living Will?: (!) No  Advance Directives     Power of  Living Will ACP-Advance Directive ACP-Power of     Not on File Not on File Filed [address] Interventions:  · No Living Will: see progress note    Health Habits/Nutrition:  Health Habits/Nutrition  Do you exercise for at least 20 minutes 2-3 times per week?: (!) No  Have you lost any weight without trying in the past 3 months?: No  Do you eat fewer than 2 meals per day?: No  Have you seen a dentist within the past year?: (!) No  Body mass index: (!) 37.67  Health Habits/Nutrition Interventions:  · Inadequate physical activity:  patient is not ready to increase his/her physical activity level at this time  · Dental exam overdue:  patient encouraged to make appointment with his/her dentist    Safety:  Safety  Do you have working smoke detectors?: Yes  Have all throw rugs been removed or fastened?: (!) No  Do you have non-slip mats or surfaces in all bathtubs/showers?: (!) No  Do all of your stairways have a railing or banister?: (!) No  Are your doorways, halls and stairs free of clutter?: Yes  Do you always fasten your seatbelt when you are in a car?: Yes  Safety Interventions:  · Home safety tips provided    Personalized Preventive Plan   Current Health Maintenance Status  Immunization History   Administered Date(s) Administered    Influenza, High Dose (Fluzone 65 yrs and older) 10/16/2019    Influenza, Quadv, adjuvanted, 65 yrs +, IM, PF (Fluad) 10/21/2020    Pneumococcal Conjugate 13-valent (Flordia Reel) 10/16/2019    Pneumococcal Polysaccharide ([id number]) 10/21/2020        Health Maintenance   Topic Date Due    DTaP/Tdap/Td vaccine (1 - Tdap) 05/02/1973    Shingles Vaccine (1 of 2) 05/02/2004    Diabetic microalbuminuria test  03/09/2019    Annual Wellness Visit (AWV)  05/29/2019    Lipid screen  08/05/2020    Breast cancer screen  10/23/2020    Potassium monitoring  03/04/2021    Creatinine monitoring  03/04/2021    Diabetic retinal exam  09/03/2021    Diabetic foot exam  10/21/2021    A1C test (Diabetic or Prediabetic)  10/21/2021    Colon cancer screen colonoscopy  10/18/2029    DEXA (modify frequency per FRAX score)  Completed    Flu vaccine  Completed    Pneumococcal 65+ years Vaccine  Completed    Hepatitis C screen  Completed    Hepatitis A vaccine  Aged Out    Hib vaccine  Aged Out    Meningococcal (ACWY) vaccine  Aged Out     Recommendations for [employer] Due: see orders and patient instructions/AVS.  . Recommended screening schedule for the next 5-10 years is provided to the patient in written form: see Patient Instructions/AVS.    Geronimo ZARCO LPN, 31/81/8115, performed the documented evaluation under the direct supervision of the attending physician. Reviewed and addendums made by myself on the day of the visit with the patient. Adelaida Antonio MD  Advance Care Planning   The patient has the following advanced directives on file:  Advance Directives     Power of [facility] Will ACP-Advance Directive ACP-Power of     Not on File Not on File [employer]          The patient has appointed the following active healthcare agents: The Patient has the following current code status:    Code Status: Full Code    Visit Documentation:  I discussed Advance Care Planning with Gail Campos today which included the importance of making their choices for care and treatment in the case of a health event that adversely affects their decision-making abilities. She has not completed the Advance Care Directives. She has an active health care agent at this time. Gail Campos was encouraged to complete the declaration forms and provide a signed copy of her medical records. I advised patient we would continue this discussion at future visits.          Adelaida Antonio MD  10/25/2020

## 2020-10-21 NOTE — PATIENT INSTRUCTIONS
greater. Reapply every 2 to 3 hours or after sweating, drying off with a towel, or swimming. · Always wear a seat belt when traveling in a car. Always wear a helmet when riding a bicycle or motorcycle. Personalized Preventive Plan for Liliana Saba - 10/21/2020  Medicare offers a range of preventive health benefits. Some of the tests and screenings are paid in full while other may be subject to a deductible, co-insurance, and/or copay. Some of these benefits include a comprehensive review of your medical history including lifestyle, illnesses that may run in your family, and various assessments and screenings as appropriate. After reviewing your medical record and screening and assessments performed today your provider may have ordered immunizations, labs, imaging, and/or referrals for you. A list of these orders (if applicable) as well as your Preventive Care list are included within your After Visit Summary for your review. Other Preventive Recommendations:    A preventive eye exam performed by an eye specialist is recommended every 1-2 years to screen for glaucoma; cataracts, macular degeneration, and other eye disorders. A preventive dental visit is recommended every 6 months. Try to get at least 150 minutes of exercise per week or 10,000 steps per day on a pedometer . Order or download the FREE \"Exercise & Physical Activity: Your Everyday Guide\" from [employer]. Call 7-234.101.2429 or search [employer] online. You need 0498-0950 mg of calcium and 0245-3926 IU of vitamin D per day. It is possible to meet your calcium requirement with diet alone, but a vitamin D supplement is usually necessary to meet this goal.  When exposed to the sun, use a sunscreen that protects against both UVA and UVB radiation with an SPF of 30 or greater. Reapply every 2 to 3 hours or after sweating, drying off with a towel, or swimming.   Always wear a seat belt when traveling in a car. Patient Education        Epley Maneuver at Home for Vertigo: Exercises  Introduction  Vertigo is a spinning or whirling sensation when you move your head. Your doctor may have moved you in different positions to help your vertigo get better faster. This is called the Epley maneuver. Your doctor also may have asked you to do these exercises at home. Do the exercises as often as your doctor recommends. If your vertigo is getting worse, your doctor may have you change the exercise or stop it. Step 1  Step 1   Sit on the edge of a bed or sofa. Step 2   Turn your head 45 degrees in the direction your doctor told you to. This should be toward the ear that causes the most vertigo for you. In this picture, the woman is turning toward her left ear. Step 3   Tilt yourself backward until you are lying on your back. Your head should still be at a 45-degree turn. Your head should be about midway between looking straight ahead and looking out to your side. Hold for 30 seconds. If you have vertigo, stay in this position until it stops. Step 4   Turn your head 90 degrees toward the ear that has the least vertigo. In this picture, the woman is turning to the right because she has vertigo on her left side. The point of your chin should be raised and over your shoulder. Hold for 30 seconds. Step 5   Roll onto the side with the least vertigo. You should now be looking at the floor. Hold for 30 seconds. Follow-up care is a key part of your treatment and safety. Be sure to make and go to all appointments, and call your doctor if you are having problems. It's also a good idea to know your test results and keep a list of the medicines you take. Where can you learn more? Go to https://chkristieeweb.[email or web address]. org and sign in to your [employer] account. Enter U347 in the [employer] box to learn more about \"Epley Maneuver at Home for Vertigo: Exercises. \"     If you do not have an account, please click on the \"Sign Up Now\" link. Current as of: November 20, 2019               Content Version: 12.6  © 4187-8216 [employer]. Care instructions adapted under license by [facility] ([facility]). If you have questions about a medical condition or this instruction, always ask your healthcare professional. [address] any warranty or liability for your use of this information.

## 2020-10-22 ENCOUNTER — HOSPITAL ENCOUNTER (OUTPATIENT)
Dept: NON INVASIVE DIAGNOSTICS | Age: 65
Discharge: HOME | End: 2020-10-22
Payer: MEDICARE

## 2020-10-22 PROCEDURE — 93271 ECG/MONITORING AND ANALYSIS: CPT

## 2020-10-22 PROCEDURE — 93270 REMOTE 30 DAY ECG REV/REPORT: CPT

## 2020-10-24 RX ORDER — ONDANSETRON 4 MG/1
4 TABLET, ORALLY DISINTEGRATING ORAL EVERY 8 HOURS PRN
Qty: 20 TABLET | Refills: 0 | Status: SHIPPED | OUTPATIENT
Start: 2020-10-24 | End: 2022-07-07

## 2020-10-24 RX ORDER — AMLODIPINE BESYLATE 10 MG/1
10 TABLET ORAL DAILY
Qty: 90 TABLET | Refills: 3 | Status: SHIPPED | OUTPATIENT
Start: 2020-10-24 | End: 2021-10-25 | Stop reason: SDUPTHER

## 2020-10-24 RX ORDER — METOPROLOL SUCCINATE 25 MG/1
12.5 TABLET, EXTENDED RELEASE ORAL DAILY
Qty: 90 TABLET | Refills: 1 | Status: SHIPPED | OUTPATIENT
Start: 2020-10-24 | End: 2021-05-19 | Stop reason: SDUPTHER

## 2020-10-24 RX ORDER — OMEPRAZOLE 20 MG/1
20 CAPSULE, DELAYED RELEASE ORAL DAILY
Qty: 90 CAPSULE | Refills: 3 | Status: SHIPPED | OUTPATIENT
Start: 2020-10-24 | End: 2021-10-25 | Stop reason: SDUPTHER

## 2020-10-24 RX ORDER — MONTELUKAST SODIUM 4 MG/1
1 TABLET, CHEWABLE ORAL DAILY
Qty: 60 TABLET | Refills: 3 | Status: SHIPPED | OUTPATIENT
Start: 2020-10-24 | End: 2021-01-29 | Stop reason: SDUPTHER

## 2020-10-24 RX ORDER — HYDROCHLOROTHIAZIDE 25 MG/1
TABLET ORAL
Qty: 90 TABLET | Refills: 3 | Status: SHIPPED | OUTPATIENT
Start: 2020-10-24 | End: 2021-10-21 | Stop reason: SDUPTHER

## 2020-10-24 RX ORDER — GLIMEPIRIDE 2 MG/1
2 TABLET ORAL
Qty: 90 TABLET | Refills: 3 | Status: SHIPPED | OUTPATIENT
Start: 2020-10-24 | End: 2021-10-25 | Stop reason: SDUPTHER

## 2020-11-02 ENCOUNTER — TELEPHONE (OUTPATIENT)
Dept: FAMILY MEDICINE CLINIC | Age: 65
End: 2020-11-02

## 2020-11-02 RX ORDER — MECLIZINE HYDROCHLORIDE 25 MG/1
25 TABLET ORAL 3 TIMES DAILY PRN
Qty: 15 TABLET | Refills: 0 | Status: SHIPPED | OUTPATIENT
Start: 2020-11-02 | End: 2020-11-12

## 2020-11-02 NOTE — TELEPHONE ENCOUNTER
The antivert is called in but please see what her \"kidney infection\" symptoms are? I hesitate to prescribe an antibiotic without knowing for sure what we are treating. ?  Back pain fever burning with urination nausea vomiting body aches chills? Many of these are also symptoms we see with Covid except of course the burning with urination. Please clarify what symptoms she is having and also can offer a video visit with myself or another provider.

## 2020-11-02 NOTE — TELEPHONE ENCOUNTER
Pt called to state that she is still having issues with vertigo the at home exercises are not working wants to know if she can have some antivert called in. Also pt reports she is having some kidney infection symptoms and would like to have an antibiotic called in. Reports she cannot come in for visit due to being quarantined for having been in contact with family that has tested positive for covid until this Thursday.

## 2021-01-04 ENCOUNTER — TELEPHONE (OUTPATIENT)
Dept: FAMILY MEDICINE CLINIC | Age: 66
End: 2021-01-04

## 2021-01-04 NOTE — TELEPHONE ENCOUNTER
Patient called asking about the results from a holter monitor she had done in October.  Results are in epic- please review

## 2021-01-05 NOTE — TELEPHONE ENCOUNTER
Spoke with cardiology at [facility]- this is the final report. The interpretation is on the last page. The event monitors look different than a holter. When a patient has an event monitor you get an end of service summary. This report is scanned into the chart by [facility] [employer] and is supposed to come to the doctor in a \"results\" folder in the inbox (possibly cc results). It comes in a different folder from your usual results. Attempted to contact Medical records [phone] to see if they are able to verify that the report was sent to Dr Waqas hCua- left message for them to return call. If we need further information we can call Sergio Sanchez.

## 2021-01-06 NOTE — TELEPHONE ENCOUNTER
Reviewed the strips and they are not worrisome. Slightly higher heart rate but would be seen with activity or stress. No worrisome pathology.

## 2021-01-28 NOTE — TELEPHONE ENCOUNTER
Patient called today saying that they are not making Colestid an longer wondering if there is anything else  could prescribe instead of Colestid ? ??

## 2021-01-29 RX ORDER — MONTELUKAST SODIUM 4 MG/1
1 TABLET, CHEWABLE ORAL DAILY
Qty: 60 TABLET | Refills: 3 | Status: SHIPPED | OUTPATIENT
Start: 2021-01-29 | End: 2021-10-12 | Stop reason: SDUPTHER

## 2021-01-29 NOTE — TELEPHONE ENCOUNTER
Please check at other pharmacies and see if this is a temp issue a local pharamcy issue or what we can expect. No clear substitute for her problem except cholestyramine powder.

## 2021-01-29 NOTE — TELEPHONE ENCOUNTER
Spoke with [facility]- they do not have the generic- it is on backorder. They do have the brand but it would be more expensive for the patient. Spoke with [facility]- they do have the generic in stock. Left message for patient to return call.

## 2021-03-25 ENCOUNTER — OFFICE VISIT (OUTPATIENT)
Dept: FAMILY MEDICINE CLINIC | Age: 66
End: 2021-03-25
Payer: MEDICARE

## 2021-03-25 VITALS
BODY MASS INDEX: 36.58 KG/M2 | SYSTOLIC BLOOD PRESSURE: 122 MMHG | WEIGHT: 200 LBS | OXYGEN SATURATION: 97 % | HEART RATE: 101 BPM | DIASTOLIC BLOOD PRESSURE: 76 MMHG

## 2021-03-25 DIAGNOSIS — M54.2 NECK PAIN: Primary | ICD-10-CM

## 2021-03-25 PROCEDURE — 4040F PNEUMOC VAC/ADMIN/RCVD: CPT | Performed by: FAMILY MEDICINE

## 2021-03-25 PROCEDURE — G8400 PT W/DXA NO RESULTS DOC: HCPCS | Performed by: FAMILY MEDICINE

## 2021-03-25 PROCEDURE — 1123F ACP DISCUSS/DSCN MKR DOCD: CPT | Performed by: FAMILY MEDICINE

## 2021-03-25 PROCEDURE — 1036F TOBACCO NON-USER: CPT | Performed by: FAMILY MEDICINE

## 2021-03-25 PROCEDURE — G8484 FLU IMMUNIZE NO ADMIN: HCPCS | Performed by: FAMILY MEDICINE

## 2021-03-25 PROCEDURE — G8417 CALC BMI ABV UP PARAM F/U: HCPCS | Performed by: FAMILY MEDICINE

## 2021-03-25 PROCEDURE — 3017F COLORECTAL CA SCREEN DOC REV: CPT | Performed by: FAMILY MEDICINE

## 2021-03-25 PROCEDURE — G8427 DOCREV CUR MEDS BY ELIG CLIN: HCPCS | Performed by: FAMILY MEDICINE

## 2021-03-25 PROCEDURE — 99214 OFFICE O/P EST MOD 30 MIN: CPT | Performed by: FAMILY MEDICINE

## 2021-03-25 PROCEDURE — 1090F PRES/ABSN URINE INCON ASSESS: CPT | Performed by: FAMILY MEDICINE

## 2021-03-25 PROCEDURE — 99213 OFFICE O/P EST LOW 20 MIN: CPT | Performed by: FAMILY MEDICINE

## 2021-03-25 RX ORDER — TIZANIDINE 4 MG/1
4 TABLET ORAL EVERY 6 HOURS PRN
Qty: 20 TABLET | Refills: 0 | Status: SHIPPED | OUTPATIENT
Start: 2021-03-25 | End: 2022-07-07

## 2021-03-25 RX ORDER — TRAMADOL HYDROCHLORIDE 50 MG/1
50 TABLET ORAL EVERY 6 HOURS PRN
Qty: 20 TABLET | Refills: 0 | Status: SHIPPED | OUTPATIENT
Start: 2021-03-25 | End: 2021-03-30

## 2021-03-25 ASSESSMENT — PATIENT HEALTH QUESTIONNAIRE - PHQ9
2. FEELING DOWN, DEPRESSED OR HOPELESS: 0
SUM OF ALL RESPONSES TO PHQ9 QUESTIONS 1 & 2: 0
SUM OF ALL RESPONSES TO PHQ QUESTIONS 1-9: 0
1. LITTLE INTEREST OR PLEASURE IN DOING THINGS: 0
SUM OF ALL RESPONSES TO PHQ QUESTIONS 1-9: 0

## 2021-03-25 NOTE — PROGRESS NOTES
[facility]  [address]  Dept: [phone]  Dept TFA:[phone]    Kari Mathis is a 76 y.o. female who presents today for her medical conditions/complaints as notedbelow. Kari Mathis is c/o of Neck Pain (both sides of neck, sometimes goes to her arms also )      HPI:     HPI    Has pain in her neck-- has had this before. 2 weeks ago had some pain in her heel so she was limping around some with that then this started up-- no falls. Has been using the heat and ice and IB a lot. Pain in both sides of her neck and into both arms and into the right arm with tingling mya. Had some weakness int he right arm at one time. The muscle around the left shoulder blade. Cannot lean her head back. Feels like the neck is even swollen. Has history of fusion of C3--5 are fused. No weakness in her legs at all.      No chest pain or shortness of breath no palpitations    BP Readings from Last 3 Encounters:   03/25/21 122/76   10/21/20 138/84   03/11/20 114/78          (goal 120/80)    Wt Readings from Last 3 Encounters:   03/25/21 200 lb (90.7 kg)   10/21/20 206 lb (93.4 kg)   02/24/20 197 lb 6.4 oz (89.5 kg)        Past Medical History:   Diagnosis Date    Bone spur of foot     bilateral    Breast lump     left breast lump - benign (removed)    Diverticulitis of sigmoid colon 2001    Hyperlipidemia     Hypertension     Type 2 diabetes mellitus without complication ([facility] [address]       Past Surgical History:   Procedure Laterality Date    APPENDECTOMY      CERVICAL FUSION      C3-C5    CHOLECYSTECTOMY  08/16/2019    COLON SURGERY N/A 10/24/2001    Sigmoid colectomy for acute and chronic diverticulitis with obstruction    COLONOSCOPY  2003    diverticulitis    COLONOSCOPY  10/18/2019    FOOT SURGERY Bilateral     bone spurs in the feet    HYSTERECTOMY, TOTAL ABDOMINAL      NECK SURGERY      SALPINGO-OOPHORECTOMY  1990    Bilateral-- seperate from hyst with adhesion lysis       Family History   Problem Relation Age of Onset   [facility] Breast Cancer Mother     Diabetes Mother     Other Mother         diverticulitis (bowel resection)    Cancer Father         prostate, squamous cell Ca    Other Maternal Grandfather         diverticulitis    Heart Attack Maternal Grandmother     Cancer Paternal Grandmother     Cancer Paternal Grandfather     Breast Cancer Sister     Breast Cancer Sister        Social History     Tobacco Use    Smoking status: Never Smoker    Smokeless tobacco: Never Used   Substance Use Topics    Alcohol use: No      Current Outpatient Medications   Medication Sig Dispense Refill    tiZANidine (ZANAFLEX) 4 MG tablet Take 1 tablet by mouth every 6 hours as needed (spasm) 20 tablet 0    traMADol (ULTRAM) 50 MG tablet Take 1 tablet by mouth every 6 hours as needed for Pain for up to 5 days. Intended supply: 5 days. Take lowest dose possible to manage pain 20 tablet 0    colestipol (COLESTID) 1 g tablet Take 1 tablet by mouth daily 60 tablet 3    metoprolol succinate (TOPROL XL) 25 MG extended release tablet Take 0.5 tablets by mouth daily 90 tablet 1    metFORMIN (GLUCOPHAGE) 500 MG tablet Take 1 tablet by mouth 2 times daily 360 tablet 3    glimepiride (AMARYL) 2 MG tablet Take 1 tablet by mouth every morning (before breakfast) 90 tablet 3    hydroCHLOROthiazide (HYDRODIURIL) 25 MG tablet TAKE 1 TABLET BY MOUTH ONCE DAILY 90 tablet 3    amLODIPine (NORVASC) 10 MG tablet Take 1 tablet by mouth daily 90 tablet 3    omeprazole (PRILOSEC) 20 MG delayed release capsule Take 1 capsule by mouth daily 90 capsule 3    ondansetron (ZOFRAN ODT) 4 MG disintegrating tablet Take 1 tablet by mouth every 8 hours as needed for Nausea or Vomiting 20 tablet 0     No current facility-administered medications for this visit.       Allergies   Allergen Reactions    Sulfa Antibiotics Hives    Metronidazole Nausea And Vomiting       Health Maintenance Topic Date Due    DTaP/Tdap/Td vaccine (1 - Tdap) Never done    Shingles Vaccine (1 of 2) Never done    Diabetic microalbuminuria test  03/09/2019    Lipid screen  08/05/2020    Potassium monitoring  03/04/2021    Creatinine monitoring  03/04/2021    COVID-19 Vaccine (2 - Pfizer 2-dose series) 04/06/2021    Diabetic retinal exam  09/03/2021    Diabetic foot exam  10/21/2021    A1C test (Diabetic or Prediabetic)  10/21/2021    Annual Wellness Visit (AWV)  10/22/2021    Breast cancer screen  12/16/2021    Colon cancer screen colonoscopy  10/18/2029    DEXA (modify frequency per FRAX score)  Completed    Flu vaccine  Completed    Pneumococcal 65+ years Vaccine  Completed    Hepatitis C screen  Completed    Hepatitis A vaccine  Aged Out    Hib vaccine  Aged Out    Meningococcal (ACWY) vaccine  Aged Out       Subjective:      Review of Systems    Objective:     /76 (Site: Left Upper Arm, Position: Sitting, Cuff Size: Large Adult)   Pulse 101   Wt 200 lb (90.7 kg)   SpO2 97%   BMI 36.58 kg/m²     Physical Exam  Vitals signs and nursing note reviewed. Constitutional:       Appearance: Normal appearance. HENT:      Head: Normocephalic. Neck:      Musculoskeletal: Muscular tenderness present. No neck rigidity. Comments: DTRs normal in the upper extremity normal strength and sensation in the upper extremities bilaterally  Cardiovascular:      Rate and Rhythm: Normal rate. Pulmonary:      Effort: Pulmonary effort is normal.   Lymphadenopathy:      Cervical: No cervical adenopathy. Neurological:      Mental Status: She is alert. Assessment/Plan:     1. Neck pain  -     XR CERVICAL SPINE (2-3 VIEWS); Future  -     tiZANidine (ZANAFLEX) 4 MG tablet; Take 1 tablet by mouth every 6 hours as needed (spasm), Disp-20 tablet, R-0Normal  -     [facility] [place]  -     traMADol (ULTRAM) 50 MG tablet;  Take 1 tablet by mouth every 6 hours as needed for Pain for up to 5 days. Intended supply: 5 days. Take lowest dose possible to manage pain, Disp-20 tablet, R-0Normal    With her history of the previous cervical spine fusion suspect that this is related to some arthritis in the neck. She has had the intermittent radicular symptoms hopefully with the fading nature of these we will be able to manage this with the physical therapy and pain medications. If not improving then would consider pain management referral as well. Limit the ibuprofen to no more then 2400 mg per 24 hour period and watch for GI irritation. Add in the tylenol as well up to 4000 mg per 24 hours period. Use the tizanidine 3 times daily    Add in tramadol as needed. Lab Results   Component Value Date    WBC 8.2 03/04/2020    HGB 12.4 03/04/2020    HCT 37.7 03/04/2020    .9 03/04/2020    CHOL 272 (H) 08/05/2019    TRIG 213 08/05/2019    ALT 25 03/02/2020    AST 17 03/02/2020     03/04/2020    K 3.2 (L) 03/04/2020     03/04/2020    CREATININE 0.7 03/04/2020    BUN 8 03/04/2020    CO2 23 03/04/2020    LABA1C 7.0 10/21/2020    LABA1C 6.2 10/17/2019    LABA1C 8.1 05/15/2019    LABMICR 10.8 (H) 03/09/2018       Return if symptoms worsen or fail to improve. Patient given educational materials - see patientinstructions. Discussed use, benefit, and side effects of prescribed medications. All patient questions answered. Pt voiced understanding. Reviewed health maintenance. Instructed to continue current medications, diet andexercise. Patient agreed with treatment plan. Follow up as directed.      (Please note that portions of this note were completed with a voice-recognition program. Efforts were made to edit the dictation but occasionally words are mis-transcribed.)    Electronically signed by Ann Jacobs MD on 3/28/2021

## 2021-03-25 NOTE — PATIENT INSTRUCTIONS
Limit the ibuprofen to no more then 2400 mg per 24 hour period and watch for GI irritation. Add in the tylenol as well up to 4000 mg per 24 hours period. Use the tizanidine 3 times daily    Add in tramadol as needed.

## 2021-05-19 DIAGNOSIS — I10 HYPERTENSION, ESSENTIAL: ICD-10-CM

## 2021-05-19 DIAGNOSIS — E11.8 TYPE 2 DIABETES MELLITUS WITH COMPLICATION, WITHOUT LONG-TERM CURRENT USE OF INSULIN (HCC): ICD-10-CM

## 2021-05-19 RX ORDER — METOPROLOL SUCCINATE 25 MG/1
12.5 TABLET, EXTENDED RELEASE ORAL DAILY
Qty: 90 TABLET | Refills: 1 | Status: SHIPPED | OUTPATIENT
Start: 2021-05-19 | End: 2021-08-16 | Stop reason: SDUPTHER

## 2021-05-19 NOTE — TELEPHONE ENCOUNTER
Garry Chapman is requesting a refill on the following medication(s):  Requested Prescriptions     Pending Prescriptions Disp Refills    metoprolol succinate (TOPROL XL) 25 MG extended release tablet 90 tablet 1     Sig: Take 0.5 tablets by mouth daily       Last Visit Date (If Applicable):  3/18/2770    Next Visit Date:    Visit date not found

## 2021-08-12 DIAGNOSIS — I10 HYPERTENSION, ESSENTIAL: ICD-10-CM

## 2021-08-12 DIAGNOSIS — E11.8 TYPE 2 DIABETES MELLITUS WITH COMPLICATION, WITHOUT LONG-TERM CURRENT USE OF INSULIN (HCC): ICD-10-CM

## 2021-08-12 RX ORDER — METOPROLOL SUCCINATE 25 MG/1
12.5 TABLET, EXTENDED RELEASE ORAL DAILY
Qty: 90 TABLET | Refills: 1 | Status: CANCELLED | OUTPATIENT
Start: 2021-08-12

## 2021-08-12 NOTE — TELEPHONE ENCOUNTER
Patient called for refill. Needs to be adjusted to take 1/2 tablet in morning 1/2 tablet in pm. Patient said that she had discussed this with doctor.

## 2021-08-16 DIAGNOSIS — E11.8 TYPE 2 DIABETES MELLITUS WITH COMPLICATION, WITHOUT LONG-TERM CURRENT USE OF INSULIN (HCC): ICD-10-CM

## 2021-08-16 DIAGNOSIS — I10 HYPERTENSION, ESSENTIAL: ICD-10-CM

## 2021-08-16 RX ORDER — METOPROLOL SUCCINATE 25 MG/1
12.5 TABLET, EXTENDED RELEASE ORAL 2 TIMES DAILY
Qty: 90 TABLET | Refills: 1 | Status: SHIPPED | OUTPATIENT
Start: 2021-08-16 | End: 2021-11-15 | Stop reason: SDUPTHER

## 2021-08-16 NOTE — TELEPHONE ENCOUNTER
Lee Olmstead is requesting a refill on the following medication(s):  Requested Prescriptions     Pending Prescriptions Disp Refills    metoprolol succinate (TOPROL XL) 25 MG extended release tablet 90 tablet 1     Sig: Take 0.5 tablets by mouth daily       Last Visit Date (If Applicable):  0/94/0308    Next Visit Date:    Visit date not found

## 2021-08-16 NOTE — TELEPHONE ENCOUNTER
----- Message from April Charlie sent at 8/16/2021 12:56 PM EDT -----  Subject: Message to Provider    QUESTIONS  Information for Provider? patient has sent a refill request for her   metoprolol 25 mg to be filled. patient doctor upped for dose to 1/2 tablet   day and night so she has run out of her medication prior to pharmacy being   able to fill it on 9/5. patient pharmacy [facility] needs a new script with   the higher dosage so they can fill for patient   ---------------------------------------------------------------------------  --------------  CALL BACK INFO  What is the best way for the office to contact you? OK to leave message on   voicemail  Preferred Call Back Phone Number? [phone]  ---------------------------------------------------------------------------  --------------  SCRIPT ANSWERS  Relationship to Patient?  Self

## 2021-10-12 NOTE — TELEPHONE ENCOUNTER
Maggy Cha is requesting a refill on the following medication(s):  Requested Prescriptions     Pending Prescriptions Disp Refills    colestipol (COLESTID) 1 g tablet 60 tablet 3     Sig: Take 1 tablet by mouth daily       Last Visit Date (If Applicable):  0/61/3322    Next Visit Date:    Visit date not found

## 2021-10-13 RX ORDER — MONTELUKAST SODIUM 4 MG/1
1 TABLET, CHEWABLE ORAL DAILY
Qty: 60 TABLET | Refills: 5 | Status: SHIPPED | OUTPATIENT
Start: 2021-10-13 | End: 2021-11-15 | Stop reason: SDUPTHER

## 2021-10-21 DIAGNOSIS — E11.8 TYPE 2 DIABETES MELLITUS WITH COMPLICATION, WITHOUT LONG-TERM CURRENT USE OF INSULIN (HCC): ICD-10-CM

## 2021-10-21 DIAGNOSIS — E78.2 MIXED HYPERLIPIDEMIA: ICD-10-CM

## 2021-10-21 DIAGNOSIS — I10 HYPERTENSION, ESSENTIAL: ICD-10-CM

## 2021-10-21 DIAGNOSIS — I10 HYPERTENSION, ESSENTIAL: Primary | ICD-10-CM

## 2021-10-21 RX ORDER — HYDROCHLOROTHIAZIDE 25 MG/1
TABLET ORAL
Qty: 90 TABLET | Refills: 0 | Status: SHIPPED | OUTPATIENT
Start: 2021-10-21 | End: 2021-11-15 | Stop reason: SDUPTHER

## 2021-10-21 NOTE — TELEPHONE ENCOUNTER
Please call patient to schedule an appointment and will need labs-- has been a year since her last visit with her labs-- orders put in.

## 2021-10-21 NOTE — TELEPHONE ENCOUNTER
Marylene Staples is requesting a refill on the following medication(s):  Requested Prescriptions     Pending Prescriptions Disp Refills    hydroCHLOROthiazide (HYDRODIURIL) 25 MG tablet 90 tablet 3     Sig: TAKE 1 TABLET BY MOUTH ONCE DAILY       Last Visit Date (If Applicable):  0/81/7638    Next Visit Date:    Visit date not found

## 2021-10-22 DIAGNOSIS — I10 HYPERTENSION, ESSENTIAL: ICD-10-CM

## 2021-10-22 DIAGNOSIS — E11.8 TYPE 2 DIABETES MELLITUS WITH COMPLICATION, WITHOUT LONG-TERM CURRENT USE OF INSULIN (HCC): ICD-10-CM

## 2021-10-25 DIAGNOSIS — I10 HYPERTENSION, ESSENTIAL: ICD-10-CM

## 2021-10-25 DIAGNOSIS — E11.8 TYPE 2 DIABETES MELLITUS WITH COMPLICATION, WITHOUT LONG-TERM CURRENT USE OF INSULIN (HCC): ICD-10-CM

## 2021-10-25 RX ORDER — OMEPRAZOLE 20 MG/1
20 CAPSULE, DELAYED RELEASE ORAL DAILY
Qty: 30 CAPSULE | Refills: 0 | Status: SHIPPED | OUTPATIENT
Start: 2021-10-25 | End: 2021-11-15 | Stop reason: SDUPTHER

## 2021-10-25 RX ORDER — GLIMEPIRIDE 2 MG/1
2 TABLET ORAL
Qty: 30 TABLET | Refills: 0 | Status: SHIPPED | OUTPATIENT
Start: 2021-10-25 | End: 2021-11-15 | Stop reason: SDUPTHER

## 2021-10-25 RX ORDER — GLIMEPIRIDE 2 MG/1
TABLET ORAL
Qty: 90 TABLET | Refills: 0 | Status: SHIPPED | OUTPATIENT
Start: 2021-10-25 | End: 2021-11-15

## 2021-10-25 RX ORDER — AMLODIPINE BESYLATE 10 MG/1
TABLET ORAL
Qty: 90 TABLET | Refills: 0 | Status: SHIPPED | OUTPATIENT
Start: 2021-10-25 | End: 2021-11-15 | Stop reason: SDUPTHER

## 2021-10-25 RX ORDER — AMLODIPINE BESYLATE 10 MG/1
10 TABLET ORAL DAILY
Qty: 30 TABLET | Refills: 0 | Status: SHIPPED | OUTPATIENT
Start: 2021-10-25 | End: 2021-11-15

## 2021-10-25 NOTE — TELEPHONE ENCOUNTER
[facility] is requesting a refill on the following medication(s):  Requested Prescriptions     Pending Prescriptions Disp Refills    amLODIPine (NORVASC) 10 MG tablet 90 tablet 3     Sig: Take 1 tablet by mouth daily       Last Visit Date (If Applicable):  2/46/1054    Next Visit Date:    11/15/2021

## 2021-11-03 LAB
ALBUMIN/GLOBULIN RATIO: 1.3 G/DL
ALBUMIN: 4.3 G/DL (ref 3.5–5)
ALP BLD-CCNC: 99 UNITS/L (ref 38–126)
ALT SERPL-CCNC: 20 UNITS/L (ref 4–35)
ANION GAP SERPL CALCULATED.3IONS-SCNC: 7.3 MMOL/L
AST SERPL-CCNC: 17 UNITS/L (ref 14–36)
BILIRUB SERPL-MCNC: 0.5 MG/DL (ref 0.2–1.3)
BUN BLDV-MCNC: 14 MG/DL (ref 7–17)
CALCIUM SERPL-MCNC: 9.9 MG/DL (ref 8.4–10.2)
CHLORIDE BLD-SCNC: 108 MMOL/L (ref 98–120)
CHOLESTEROL/HDL RATIO: 5.53 RATIO (ref 0–4.5)
CHOLESTEROL: 260 MG/DL (ref 50–200)
CO2: 26 MMOL/L (ref 22–31)
CREAT SERPL-MCNC: 0.7 MG/DL (ref 0.5–1)
GFR CALCULATED: > 60
GLOBULIN: 3.2 G/DL
GLUCOSE: 156 MG/DL (ref 65–105)
HDLC SERPL-MCNC: 47 MG/DL (ref 36–68)
LDL CHOLESTEROL CALCULATED: 153.8 MG/DL (ref 0–160)
POTASSIUM SERPL-SCNC: 4.3 MMOL/L (ref 3.6–5)
SODIUM BLD-SCNC: 141 MMOL/L (ref 135–145)
TOTAL PROTEIN, SERUM: 7.5 G/DL (ref 6.3–8.2)
TRIGL SERPL-MCNC: 296 MG/DL (ref 10–250)
VLDLC SERPL CALC-MCNC: 59.2 MG/DL (ref 0–50)

## 2021-11-15 ENCOUNTER — OFFICE VISIT (OUTPATIENT)
Dept: FAMILY MEDICINE CLINIC | Age: 66
End: 2021-11-15
Payer: MEDICARE

## 2021-11-15 VITALS
HEIGHT: 62 IN | WEIGHT: 201 LBS | HEART RATE: 108 BPM | OXYGEN SATURATION: 98 % | DIASTOLIC BLOOD PRESSURE: 82 MMHG | BODY MASS INDEX: 36.99 KG/M2 | RESPIRATION RATE: 20 BRPM | SYSTOLIC BLOOD PRESSURE: 125 MMHG

## 2021-11-15 DIAGNOSIS — M77.01: ICD-10-CM

## 2021-11-15 DIAGNOSIS — G89.29 CHRONIC RIGHT UPPER QUADRANT PAIN: ICD-10-CM

## 2021-11-15 DIAGNOSIS — E78.2 MIXED HYPERLIPIDEMIA: ICD-10-CM

## 2021-11-15 DIAGNOSIS — I10 HYPERTENSION, ESSENTIAL: ICD-10-CM

## 2021-11-15 DIAGNOSIS — M77.11 EPICONDYLITIS, LATERAL, RIGHT: ICD-10-CM

## 2021-11-15 DIAGNOSIS — E11.8 TYPE 2 DIABETES MELLITUS WITH COMPLICATION, WITHOUT LONG-TERM CURRENT USE OF INSULIN (HCC): Primary | ICD-10-CM

## 2021-11-15 DIAGNOSIS — R10.11 CHRONIC RIGHT UPPER QUADRANT PAIN: ICD-10-CM

## 2021-11-15 DIAGNOSIS — E66.01 SEVERE OBESITY (BMI 35.0-35.9 WITH COMORBIDITY) (HCC): ICD-10-CM

## 2021-11-15 DIAGNOSIS — Z12.31 VISIT FOR SCREENING MAMMOGRAM: ICD-10-CM

## 2021-11-15 LAB
CREATININE, RANDOM URINE: 86.8 MG/DL (ref 20–370)
HBA1C MFR BLD: 6.8 %
MICROALBUMIN UR-MCNC: 2.6 MG/DL (ref 0–1.7)
MICROALBUMIN/CREAT UR-RTO: 29.95

## 2021-11-15 PROCEDURE — 2022F DILAT RTA XM EVC RTNOPTHY: CPT | Performed by: FAMILY MEDICINE

## 2021-11-15 PROCEDURE — G8400 PT W/DXA NO RESULTS DOC: HCPCS | Performed by: FAMILY MEDICINE

## 2021-11-15 PROCEDURE — G8484 FLU IMMUNIZE NO ADMIN: HCPCS | Performed by: FAMILY MEDICINE

## 2021-11-15 PROCEDURE — 1123F ACP DISCUSS/DSCN MKR DOCD: CPT | Performed by: FAMILY MEDICINE

## 2021-11-15 PROCEDURE — PBSHW INFLUENZA, QUADV, ADJUVANTED, 65 YRS +, IM, PF, PREFILL SYR, 0.5ML (FLUAD): Performed by: FAMILY MEDICINE

## 2021-11-15 PROCEDURE — 1036F TOBACCO NON-USER: CPT | Performed by: FAMILY MEDICINE

## 2021-11-15 PROCEDURE — G0008 ADMIN INFLUENZA VIRUS VAC: HCPCS | Performed by: FAMILY MEDICINE

## 2021-11-15 PROCEDURE — 99214 OFFICE O/P EST MOD 30 MIN: CPT | Performed by: FAMILY MEDICINE

## 2021-11-15 PROCEDURE — G8427 DOCREV CUR MEDS BY ELIG CLIN: HCPCS | Performed by: FAMILY MEDICINE

## 2021-11-15 PROCEDURE — 1090F PRES/ABSN URINE INCON ASSESS: CPT | Performed by: FAMILY MEDICINE

## 2021-11-15 PROCEDURE — G8417 CALC BMI ABV UP PARAM F/U: HCPCS | Performed by: FAMILY MEDICINE

## 2021-11-15 PROCEDURE — 3017F COLORECTAL CA SCREEN DOC REV: CPT | Performed by: FAMILY MEDICINE

## 2021-11-15 PROCEDURE — 90694 VACC AIIV4 NO PRSRV 0.5ML IM: CPT | Performed by: FAMILY MEDICINE

## 2021-11-15 PROCEDURE — PBSHW POCT GLYCOSYLATED HEMOGLOBIN (HGB A1C): Performed by: FAMILY MEDICINE

## 2021-11-15 PROCEDURE — 3044F HG A1C LEVEL LT 7.0%: CPT | Performed by: FAMILY MEDICINE

## 2021-11-15 PROCEDURE — 83036 HEMOGLOBIN GLYCOSYLATED A1C: CPT | Performed by: FAMILY MEDICINE

## 2021-11-15 PROCEDURE — 99213 OFFICE O/P EST LOW 20 MIN: CPT | Performed by: FAMILY MEDICINE

## 2021-11-15 PROCEDURE — 4040F PNEUMOC VAC/ADMIN/RCVD: CPT | Performed by: FAMILY MEDICINE

## 2021-11-15 RX ORDER — LANCETS 33 GAUGE
1 EACH MISCELLANEOUS DAILY
Qty: 100 EACH | Refills: 3 | Status: SHIPPED | OUTPATIENT
Start: 2021-11-15 | End: 2021-11-29 | Stop reason: SDUPTHER

## 2021-11-15 RX ORDER — MONTELUKAST SODIUM 4 MG/1
1 TABLET, CHEWABLE ORAL DAILY
Qty: 120 TABLET | Refills: 3 | Status: SHIPPED | OUTPATIENT
Start: 2021-11-15 | End: 2022-10-12 | Stop reason: SDUPTHER

## 2021-11-15 RX ORDER — AMLODIPINE BESYLATE 10 MG/1
TABLET ORAL
Qty: 90 TABLET | Refills: 3 | Status: SHIPPED | OUTPATIENT
Start: 2021-11-15 | End: 2022-10-12 | Stop reason: SDUPTHER

## 2021-11-15 RX ORDER — OMEPRAZOLE 20 MG/1
20 CAPSULE, DELAYED RELEASE ORAL DAILY
Qty: 90 CAPSULE | Refills: 3 | Status: SHIPPED | OUTPATIENT
Start: 2021-11-15 | End: 2022-10-12 | Stop reason: SDUPTHER

## 2021-11-15 RX ORDER — METOPROLOL SUCCINATE 25 MG/1
12.5 TABLET, EXTENDED RELEASE ORAL 2 TIMES DAILY
Qty: 90 TABLET | Refills: 1 | Status: SHIPPED | OUTPATIENT
Start: 2021-11-15 | End: 2022-02-10

## 2021-11-15 RX ORDER — GLUCOSAMINE HCL/CHONDROITIN SU 500-400 MG
1 CAPSULE ORAL DAILY
Qty: 50 STRIP | Refills: 5 | Status: SHIPPED | OUTPATIENT
Start: 2021-11-15 | End: 2021-11-29 | Stop reason: SDUPTHER

## 2021-11-15 RX ORDER — ROSUVASTATIN CALCIUM 10 MG/1
10 TABLET, COATED ORAL NIGHTLY
Qty: 30 TABLET | Refills: 5 | Status: SHIPPED | OUTPATIENT
Start: 2021-11-15 | End: 2022-06-29

## 2021-11-15 RX ORDER — HYDROCHLOROTHIAZIDE 25 MG/1
TABLET ORAL
Qty: 90 TABLET | Refills: 3 | Status: SHIPPED | OUTPATIENT
Start: 2021-11-15 | End: 2022-10-12 | Stop reason: SDUPTHER

## 2021-11-15 RX ORDER — GLIMEPIRIDE 2 MG/1
2 TABLET ORAL
Qty: 90 TABLET | Refills: 3 | Status: SHIPPED | OUTPATIENT
Start: 2021-11-15 | End: 2022-10-12 | Stop reason: SDUPTHER

## 2021-11-15 SDOH — ECONOMIC STABILITY: FOOD INSECURITY: WITHIN THE PAST 12 MONTHS, YOU WORRIED THAT YOUR FOOD WOULD RUN OUT BEFORE YOU GOT MONEY TO BUY MORE.: NEVER TRUE

## 2021-11-15 SDOH — ECONOMIC STABILITY: FOOD INSECURITY: WITHIN THE PAST 12 MONTHS, THE FOOD YOU BOUGHT JUST DIDN'T LAST AND YOU DIDN'T HAVE MONEY TO GET MORE.: NEVER TRUE

## 2021-11-15 ASSESSMENT — SOCIAL DETERMINANTS OF HEALTH (SDOH): HOW HARD IS IT FOR YOU TO PAY FOR THE VERY BASICS LIKE FOOD, HOUSING, MEDICAL CARE, AND HEATING?: NOT HARD AT ALL

## 2021-11-15 NOTE — PROGRESS NOTES
pain on her right side of her abd as well. Chronic in the lower right abd but now in the right upper abd,  Has the upper abd scar which is always tender but is above that. Had a very difficult cholecystectomy that was really a prolonged laparoscopic procedure. The scar area was very tender at first and she thought that this should be feeling better but it just has not. This seems to be the area where her pain is and it does occasionally go through into her back. when she eats sometimes she will have more pain at times but not at other times. She has never really felt any bulging in that area when she eats too much she is more miserable. Will have nausea and will dry heave in the morning also. Bowels are moving regularly. The colestipol has really helped this. No blood in the bowels. Has the pain in the RLQ at times -- the discomfort there is a bit better after a loose stool.       BP Readings from Last 3 Encounters:   11/15/21 125/82   03/25/21 122/76   10/21/20 138/84          (goal 120/80)    Wt Readings from Last 3 Encounters:   11/15/21 201 lb (91.2 kg)   03/25/21 200 lb (90.7 kg)   10/21/20 206 lb (93.4 kg)        Past Medical History:   Diagnosis Date    Bone spur of foot     bilateral    Breast lump     left breast lump - benign (removed)    Diverticulitis of sigmoid colon 2001    Hyperlipidemia     Hypertension     Type 2 diabetes mellitus without complication ([facility] [address]       Past Surgical History:   Procedure Laterality Date    APPENDECTOMY      CERVICAL FUSION      C3-C5    CHOLECYSTECTOMY  08/16/2019    COLON SURGERY N/A 10/24/2001    Sigmoid colectomy for acute and chronic diverticulitis with obstruction    COLONOSCOPY  2003    diverticulitis    COLONOSCOPY  10/18/2019    FOOT SURGERY Bilateral     bone spurs in the feet    HYSTERECTOMY, TOTAL ABDOMINAL      NECK SURGERY      SALPINGO-OOPHORECTOMY  1990    Bilateral-- seperate from hyst with adhesion lysis       Family History   Problem tablet Take 1 tablet by mouth every 8 hours as needed for Nausea or Vomiting 20 tablet 0    tiZANidine (ZANAFLEX) 4 MG tablet Take 1 tablet by mouth every 6 hours as needed (spasm) (Patient not taking: Reported on 11/15/2021) 20 tablet 0     No current facility-administered medications for this visit. Allergies   Allergen Reactions    Sulfa Antibiotics Hives    Metronidazole Nausea And Vomiting       Health Maintenance   Topic Date Due    DTaP/Tdap/Td vaccine (1 - Tdap) Never done    Shingles Vaccine (1 of 2) Never done    Diabetic retinal exam  09/03/2021    COVID-19 Vaccine (3 - Booster for Pfizer series) 10/06/2021    Diabetic foot exam  10/21/2021    Annual Wellness Visit (AWV)  10/22/2021    Breast cancer screen  12/16/2021    Lipid screen  11/03/2022    Potassium monitoring  11/03/2022    Creatinine monitoring  11/03/2022    A1C test (Diabetic or Prediabetic)  11/15/2022    Diabetic microalbuminuria test  11/15/2022    Colon cancer screen colonoscopy  10/18/2029    DEXA (modify frequency per FRAX score)  Completed    Flu vaccine  Completed    Pneumococcal 65+ years Vaccine  Completed    Hepatitis C screen  Completed    Hepatitis A vaccine  Aged Out    Hib vaccine  Aged Out    Meningococcal (ACWY) vaccine  Aged Out       Subjective:      Review of Systems    Objective:     /82 (Site: Left Upper Arm, Position: Sitting, Cuff Size: Large Adult)   Pulse 108   Resp 20   Ht 5' 2\" (1.575 m)   Wt 201 lb (91.2 kg)   SpO2 98%   BMI 36.76 kg/m²     Physical Exam  Vitals and nursing note reviewed. Constitutional:       Appearance: Normal appearance. She is well-developed. She is obese. HENT:      Head: Normocephalic and atraumatic. Mouth/Throat:      Mouth: Mucous membranes are moist.   Eyes:      General: No scleral icterus. Conjunctiva/sclera: Conjunctivae normal.   Neck:      Thyroid: No thyromegaly. Vascular: No JVD.    Cardiovascular:      Rate and Rhythm: Normal rate and regular rhythm. Heart sounds: Normal heart sounds. No murmur heard. No friction rub. No gallop. Pulmonary:      Effort: Pulmonary effort is normal. No respiratory distress. Breath sounds: Normal breath sounds. Abdominal:      General: Bowel sounds are normal.      Palpations: Abdomen is soft. There is no mass. Tenderness: There is abdominal tenderness. There is guarding. There is no right CVA tenderness or left CVA tenderness. Hernia: No hernia is present. Musculoskeletal:      Cervical back: Normal range of motion and neck supple. Comments: Very tender on the medial and lateral epicondyles on the right side reproduces her pain. Pronation and supination causes pain. No weakness no sensory deficit wrist flexion and extension also increased pain   Lymphadenopathy:      Cervical: No cervical adenopathy. Skin:     General: Skin is warm. Capillary Refill: Capillary refill takes less than 2 seconds. Findings: No rash. Comments: No jaundice   Neurological:      Mental Status: She is alert and oriented to person, place, and time. Psychiatric:         Mood and Affect: Mood normal.         Thought Content: Thought content normal.         Judgment: Judgment normal.         Assessment/Plan:     1. Type 2 diabetes mellitus with complication, without long-term current use of insulin ([facility])  -     POCT glycosylated hemoglobin (Hb A1C)  -     glimepiride (AMARYL) 2 MG tablet; Take 1 tablet by mouth every morning (before breakfast), Disp-90 tablet, R-3Normal  -     metoprolol succinate (TOPROL XL) 25 MG extended release tablet; Take 0.5 tablets by mouth 2 times daily, Disp-90 tablet, R-1Normal  2. Mixed hyperlipidemia  -     rosuvastatin (CRESTOR) 10 MG tablet; Take 1 tablet by mouth nightly, Disp-30 tablet, R-5Normal  3. Hypertension, essential  -     amLODIPine (NORVASC) 10 MG tablet;  Take 1 tablet by mouth once daily, Disp-90 tablet, R-3Normal  - hydroCHLOROthiazide (HYDRODIURIL) 25 MG tablet; TAKE 1 TABLET BY MOUTH ONCE DAILY, Disp-90 tablet, R-3Please consider 90 day supplies to promote better adherenceNormal  -     metoprolol succinate (TOPROL XL) 25 MG extended release tablet; Take 0.5 tablets by mouth 2 times daily, Disp-90 tablet, R-1Normal  4. Severe obesity (BMI 35.0-35.9 with comorbidity) ([address]  5. Chronic right upper quadrant pain  -     Esther Caban DO, General Surgery, [place]  6. Epicondylitis, lateral, right  7. Epicondylitis, medial humeral, right  8. Visit for screening mammogram  -     [facility] VICKY DIGITAL SCREEN BILATERAL; Future    Diabetes is well controlled at this time. She does not have any significant hypoglycemic episodes unless she skips a meal.  Encouraged to make sure she is eating regularly but if she does start to have hypoglycemic symptoms then would consider decreasing her Amaryl. Check blood sugars more regularly particular if she is having any symptoms. New prescription sent in for glucometer. Start rosuvastatin for her hyperlipidemia. New prescription sent in. We will avoid the atorvastatin as she did think she had some more muscle aches with this when on it in the past.    Continue diet and exercise for her obesity. Right upper quadrant pain appears to be focally related to her scar although no hernia actually appreciated. Dr. Tyree Mosley did her surgery in the past and she would like to return to see him. Referral put in for his [place] location. Home exercise program for epicondylitis given. Ice and rest this. Tennis elbow band also recommended. Home exercise program given. If not improving referral to occupational therapy.     Lab Results   Component Value Date    WBC 8.2 03/04/2020    HGB 12.4 03/04/2020    HCT 37.7 03/04/2020    .9 03/04/2020    CHOL 260 (H) 11/03/2021    TRIG 296 (H) 11/03/2021    HDL 47 11/03/2021    ALT 20 11/03/2021    AST 17 11/03/2021     11/03/2021 K 4.3 11/03/2021     11/03/2021    CREATININE 0.7 11/03/2021    BUN 14 11/03/2021    CO2 26 11/03/2021    LABA1C 6.8 11/15/2021    LABA1C 7.0 10/21/2020    LABA1C 6.2 10/17/2019    LABMICR 2.6 (H) 11/15/2021       Return in about 6 months (around 5/15/2022) for Medication recheck. Patient given educational materials - see patientinstructions. Discussed use, benefit, and side effects of prescribed medications. All patient questions answered. Pt voiced understanding. Reviewed health maintenance. Instructed to continue current medications, diet andexercise. Patient agreed with treatment plan. Follow up as directed.      (Please note that portions of this note were completed with a voice-recognition program. Efforts were made to edit the dictation but occasionally words are mis-transcribed.)    Electronically signed by Rachele Barriga MD on 11/15/2021

## 2021-11-16 ENCOUNTER — TELEPHONE (OUTPATIENT)
Dept: FAMILY MEDICINE CLINIC | Age: 66
End: 2021-11-16

## 2021-11-16 RX ORDER — GLUCOSAMINE HCL/CHONDROITIN SU 500-400 MG
1 CAPSULE ORAL DAILY
Qty: 50 STRIP | Refills: 5 | Status: CANCELLED | OUTPATIENT
Start: 2021-11-16

## 2021-11-16 NOTE — TELEPHONE ENCOUNTER
----- Message from Priyanka Strickland sent at 11/16/2021  1:54 PM EST -----  Subject: Medication Problem    QUESTIONS  Name of Medication? blood glucose monitor strips  Patient-reported dosage and instructions? strips   What question or problem do you have with the medication? Medicare   guidelines directions can not say as needed on prescription. please resend   Sig? remove as needed. Preferred Pharmacy? [facility] [address] [place] [address], Denia Lauryn Howard 1948 phone number (if available)? [phone]  Additional Information for Provider?   ---------------------------------------------------------------------------  --------------  [address]  What is the best way for the office to contact you? OK to leave message on   voicemail  Preferred Call Back Phone Number? [phone]  ---------------------------------------------------------------------------  --------------  SCRIPT ANSWERS  Relationship to Patient? Third Party  Representative Name?  Nessa Palmer

## 2021-11-29 DIAGNOSIS — E11.8 TYPE 2 DIABETES MELLITUS WITH COMPLICATION, WITHOUT LONG-TERM CURRENT USE OF INSULIN (HCC): Primary | ICD-10-CM

## 2021-11-29 RX ORDER — GLUCOSAMINE HCL/CHONDROITIN SU 500-400 MG
1 CAPSULE ORAL DAILY
Qty: 50 STRIP | Refills: 5 | Status: SHIPPED | OUTPATIENT
Start: 2021-11-29 | End: 2022-10-12 | Stop reason: SDUPTHER

## 2021-11-29 RX ORDER — LANCETS 33 GAUGE
1 EACH MISCELLANEOUS DAILY
Qty: 100 EACH | Refills: 3 | Status: SHIPPED | OUTPATIENT
Start: 2021-11-29 | End: 2022-10-12 | Stop reason: SDUPTHER

## 2022-02-10 DIAGNOSIS — I10 HYPERTENSION, ESSENTIAL: ICD-10-CM

## 2022-02-10 DIAGNOSIS — E11.8 TYPE 2 DIABETES MELLITUS WITH COMPLICATION, WITHOUT LONG-TERM CURRENT USE OF INSULIN (HCC): ICD-10-CM

## 2022-02-10 NOTE — TELEPHONE ENCOUNTER
Agatha Camilo is requesting a refill on the following medication(s):  Requested Prescriptions     Pending Prescriptions Disp Refills    metoprolol succinate (TOPROL XL) 25 MG extended release tablet [Pharmacy Med Name: Metoprolol Succinate ER 25 MG Oral Tablet Extended Release 24 Hour] 90 tablet 0     Sig: Take 1/2 (one-half) tablet by mouth twice daily       Last Visit Date (If Applicable):  54/71/4357    Next Visit Date:    5/16/2022

## 2022-02-13 RX ORDER — METOPROLOL SUCCINATE 25 MG/1
TABLET, EXTENDED RELEASE ORAL
Qty: 90 TABLET | Refills: 1 | Status: SHIPPED | OUTPATIENT
Start: 2022-02-13 | End: 2022-10-12 | Stop reason: SDUPTHER

## 2022-06-28 DIAGNOSIS — E78.2 MIXED HYPERLIPIDEMIA: ICD-10-CM

## 2022-06-29 RX ORDER — ROSUVASTATIN CALCIUM 10 MG/1
10 TABLET, COATED ORAL NIGHTLY
Qty: 30 TABLET | Refills: 5 | Status: SHIPPED | OUTPATIENT
Start: 2022-06-29

## 2022-06-29 NOTE — TELEPHONE ENCOUNTER
Gail Bal is requesting a refill on the following medication(s):  Requested Prescriptions     Pending Prescriptions Disp Refills    rosuvastatin (CRESTOR) 10 MG tablet [Pharmacy Med Name: Rosuvastatin Calcium 10 MG Oral Tablet] 30 tablet 0     Sig: Take 1 tablet by mouth nightly       Last Visit Date (If Applicable):  30/06/2587    Next Visit Date:    Visit date not found

## 2022-07-07 ENCOUNTER — OFFICE VISIT (OUTPATIENT)
Dept: FAMILY MEDICINE CLINIC | Age: 67
End: 2022-07-07
Payer: MEDICARE

## 2022-07-07 VITALS
DIASTOLIC BLOOD PRESSURE: 68 MMHG | HEART RATE: 88 BPM | WEIGHT: 200.4 LBS | TEMPERATURE: 99.1 F | OXYGEN SATURATION: 97 % | BODY MASS INDEX: 36.65 KG/M2 | SYSTOLIC BLOOD PRESSURE: 128 MMHG

## 2022-07-07 DIAGNOSIS — E11.8 TYPE 2 DIABETES MELLITUS WITH COMPLICATION, WITHOUT LONG-TERM CURRENT USE OF INSULIN (HCC): ICD-10-CM

## 2022-07-07 DIAGNOSIS — R10.31 RIGHT LOWER QUADRANT ABDOMINAL PAIN: Primary | ICD-10-CM

## 2022-07-07 DIAGNOSIS — R11.2 NON-INTRACTABLE VOMITING WITH NAUSEA, UNSPECIFIED VOMITING TYPE: ICD-10-CM

## 2022-07-07 LAB
ALBUMIN/GLOBULIN RATIO: 1.6 G/DL
ALBUMIN: 4.6 G/DL (ref 3.5–5)
ALP BLD-CCNC: 101 UNITS/L (ref 38–126)
ALT SERPL-CCNC: 18 UNITS/L (ref 4–35)
ANION GAP SERPL CALCULATED.3IONS-SCNC: 10.9 MMOL/L
AST SERPL-CCNC: 21 UNITS/L (ref 14–36)
BASOPHILS %: 0.89 (ref 0–3)
BASOPHILS ABSOLUTE: 0.1 (ref 0–0.3)
BILIRUB SERPL-MCNC: 0.5 MG/DL (ref 0.2–1.3)
BUN BLDV-MCNC: 16 MG/DL (ref 7–17)
C-REACTIVE PROTEIN: 1.1 MG/DL (ref 0–1)
CALCIUM SERPL-MCNC: 9.9 MG/DL (ref 8.4–10.2)
CHLORIDE BLD-SCNC: 106 MMOL/L (ref 98–120)
CO2: 24 MMOL/L (ref 22–31)
CREAT SERPL-MCNC: 0.9 MG/DL (ref 0.5–1)
EOSINOPHILS %: 3.91 (ref 0–10)
EOSINOPHILS ABSOLUTE: 0.43 (ref 0–1.1)
GFR CALCULATED: > 60
GLOBULIN: 2.9 G/DL
GLUCOSE: 157 MG/DL (ref 65–105)
HBA1C MFR BLD: 7.5 %
HCT VFR BLD CALC: 45.2 % (ref 37–47)
HEMOGLOBIN: 13.9 (ref 12–16)
LYMPHOCYTE %: 17.78 (ref 20–51.1)
LYMPHOCYTES ABSOLUTE: 1.94 (ref 1–5.5)
MCH RBC QN AUTO: 27.4 PG (ref 28.5–32.5)
MCHC RBC AUTO-ENTMCNC: 30.8 G/DL (ref 32–37)
MCV RBC AUTO: 89.1 FL (ref 80–94)
MONOCYTES %: 6.24 (ref 1.7–9.3)
MONOCYTES ABSOLUTE: 0.68 (ref 0.1–1)
NEUTROPHILS %: 71.18 (ref 42.2–75.2)
NEUTROPHILS ABSOLUTE: 7.78 (ref 2–8.1)
PDW BLD-RTO: 12.7 % (ref 8.5–15.5)
PLATELET # BLD: 345.3 THOU/MM3 (ref 130–400)
POTASSIUM SERPL-SCNC: 4 MMOL/L (ref 3.6–5)
RBC: 5.07 M/UL (ref 4.2–5.4)
SEDIMENTATION RATE, ERYTHROCYTE: 24 MM/HR (ref 0–30)
SODIUM BLD-SCNC: 141 MMOL/L (ref 135–145)
TOTAL PROTEIN, SERUM: 7.5 G/DL (ref 6.3–8.2)
WBC: 10.9 THOU/ML3 (ref 4.8–10.8)

## 2022-07-07 PROCEDURE — 3051F HG A1C>EQUAL 7.0%<8.0%: CPT | Performed by: FAMILY MEDICINE

## 2022-07-07 PROCEDURE — G8400 PT W/DXA NO RESULTS DOC: HCPCS | Performed by: FAMILY MEDICINE

## 2022-07-07 PROCEDURE — 2022F DILAT RTA XM EVC RTNOPTHY: CPT | Performed by: FAMILY MEDICINE

## 2022-07-07 PROCEDURE — 1090F PRES/ABSN URINE INCON ASSESS: CPT | Performed by: FAMILY MEDICINE

## 2022-07-07 PROCEDURE — 3017F COLORECTAL CA SCREEN DOC REV: CPT | Performed by: FAMILY MEDICINE

## 2022-07-07 PROCEDURE — 1123F ACP DISCUSS/DSCN MKR DOCD: CPT | Performed by: FAMILY MEDICINE

## 2022-07-07 PROCEDURE — G8417 CALC BMI ABV UP PARAM F/U: HCPCS | Performed by: FAMILY MEDICINE

## 2022-07-07 PROCEDURE — 99213 OFFICE O/P EST LOW 20 MIN: CPT | Performed by: FAMILY MEDICINE

## 2022-07-07 PROCEDURE — 83036 HEMOGLOBIN GLYCOSYLATED A1C: CPT | Performed by: FAMILY MEDICINE

## 2022-07-07 PROCEDURE — G8427 DOCREV CUR MEDS BY ELIG CLIN: HCPCS | Performed by: FAMILY MEDICINE

## 2022-07-07 PROCEDURE — 99214 OFFICE O/P EST MOD 30 MIN: CPT | Performed by: FAMILY MEDICINE

## 2022-07-07 PROCEDURE — 1036F TOBACCO NON-USER: CPT | Performed by: FAMILY MEDICINE

## 2022-07-07 PROCEDURE — PBSHW POCT GLYCOSYLATED HEMOGLOBIN (HGB A1C): Performed by: FAMILY MEDICINE

## 2022-07-07 RX ORDER — AMOXICILLIN AND CLAVULANATE POTASSIUM 875; 125 MG/1; MG/1
1 TABLET, FILM COATED ORAL 2 TIMES DAILY
Qty: 14 TABLET | Refills: 0 | Status: SHIPPED | OUTPATIENT
Start: 2022-07-07 | End: 2022-07-17

## 2022-07-07 RX ORDER — ONDANSETRON 4 MG/1
4 TABLET, FILM COATED ORAL EVERY 8 HOURS PRN
Qty: 20 TABLET | Refills: 0 | Status: SHIPPED | OUTPATIENT
Start: 2022-07-07

## 2022-07-07 ASSESSMENT — PATIENT HEALTH QUESTIONNAIRE - PHQ9
2. FEELING DOWN, DEPRESSED OR HOPELESS: 0
SUM OF ALL RESPONSES TO PHQ QUESTIONS 1-9: 0
1. LITTLE INTEREST OR PLEASURE IN DOING THINGS: 0
SUM OF ALL RESPONSES TO PHQ QUESTIONS 1-9: 0
SUM OF ALL RESPONSES TO PHQ QUESTIONS 1-9: 0
SUM OF ALL RESPONSES TO PHQ9 QUESTIONS 1 & 2: 0
SUM OF ALL RESPONSES TO PHQ QUESTIONS 1-9: 0

## 2022-07-07 NOTE — PROGRESS NOTES
1200 [facility]  [address]  Dept: [phone]  Dept NNL:[phone]    Dawit Squires is a 75 y.o. female who presents today for her medical conditions/complaints as notedbelow. Dawit Squires is c/o of Abdominal Pain (diverticulitis flare up- abd pain, back pain, and nausea)      HPI:     AZUL Munoz has a history of multiple episodes of diverticulitis. In 2001 she had a sigmoid colectomy for acute on chronic diverticulitis. She has had multiple colonoscopies in the past most recently in 2019 which redemonstrate diverticulosis. Current episode has been off and on for about 4 months. Pain will come and go. Has had diarrhea in the mornings and the pain is usually painful in the right lower quadrant in the morning. Is watery diarrhea. After this will have less pain. The colestipol has helped with the diarrhea a lot until the last few months. Usually is able to control her loose stools with watching what she eats. The pain was horrible on Tuesday almost all day. Heating pad also helps some. Pain has been recently throughout the lower right abd and now spreading. Pressure and bending on her lower abd is painful. The pain is also in the lower back. Has had a lot of chills and low grade temp. When she has the diarrhea will have some nausea and vomiting. Has used zofran in the past and this has helped her in the past.    Has area in the right upper quadrant which has been full and mildly uncomfortable off and on for the last several years. Was due to see general surgery to evaluate this for a possible incisional hernia but then the COVID-19 crisis occurred and she never rescheduled the appointment.     BP Readings from Last 3 Encounters:   07/07/22 128/68   11/15/21 125/82   03/25/21 122/76          (goal 120/80)    Wt Readings from Last 3 Encounters:   07/07/22 200 lb 6.4 oz (90.9 kg)   11/15/21 201 lb (91.2 kg)   03/25/21 200 lb (90.7 kg)        Past Medical History:   Diagnosis Date    Bone spur of foot     bilateral    Breast lump     left breast lump - benign (removed)    Diverticulitis of sigmoid colon 2001    Hyperlipidemia     Hypertension     Type 2 diabetes mellitus without complication ([facility] [address]       Past Surgical History:   Procedure Laterality Date    APPENDECTOMY      CERVICAL FUSION      C3-C5    CHOLECYSTECTOMY  08/16/2019    COLON SURGERY N/A 10/24/2001    Sigmoid colectomy for acute and chronic diverticulitis with obstruction    COLONOSCOPY  2003    diverticulitis    COLONOSCOPY  10/18/2019    FOOT SURGERY Bilateral     bone spurs in the feet    HYSTERECTOMY, TOTAL ABDOMINAL (CERVIX REMOVED)      NECK SURGERY      SALPINGO-OOPHORECTOMY  1990    Bilateral-- seperate from hyst with adhesion lysis       Family History   Problem Relation Age of Onset    Breast Cancer Mother     Diabetes Mother     Other Mother         diverticulitis (bowel resection)    Cancer Father         prostate, squamous cell Ca    Breast Cancer Sister         crohn    Breast Cancer Sister     Heart Attack Maternal Grandmother     Other Maternal Grandfather         diverticulitis colon resection    Cancer Paternal Grandmother     Cancer Paternal Grandfather        Social History     Tobacco Use    Smoking status: Never Smoker    Smokeless tobacco: Never Used   Substance Use Topics    Alcohol use: No      Current Outpatient Medications   Medication Sig Dispense Refill    ondansetron (ZOFRAN) 4 MG tablet Take 1 tablet by mouth every 8 hours as needed for Nausea or Vomiting 20 tablet 0    amoxicillin-clavulanate (AUGMENTIN) 875-125 MG per tablet Take 1 tablet by mouth 2 times daily for 10 days 14 tablet 0    rosuvastatin (CRESTOR) 10 MG tablet Take 1 tablet by mouth nightly 30 tablet 5    metoprolol succinate (TOPROL XL) 25 MG extended release tablet Take 1/2 (one-half) tablet by mouth twice daily 90 tablet 1    amLODIPine (NORVASC) 10 MG tablet Take 1 tablet by mouth once daily 90 tablet 3    glimepiride (AMARYL) 2 MG tablet Take 1 tablet by mouth every morning (before breakfast) 90 tablet 3    omeprazole (PRILOSEC) 20 MG delayed release capsule Take 1 capsule by mouth daily 90 capsule 3    hydroCHLOROthiazide (HYDRODIURIL) 25 MG tablet TAKE 1 TABLET BY MOUTH ONCE DAILY 90 tablet 3    colestipol (COLESTID) 1 g tablet Take 1 tablet by mouth daily 120 tablet 3    metFORMIN (GLUCOPHAGE) 500 MG tablet Take 1 tablet by mouth 2 times daily (Patient taking differently: Take 500 mg by mouth in the morning, at noon, and at bedtime ) 360 tablet 3    Lancets Micro Thin 33G MISC 1 each by Does not apply route daily And prn. Insurance preferred brand 100 each 3    blood glucose monitor strips 1 strip by Other route daily Test 1 times a day & as needed for symptoms of irregular blood glucose. Dispense sufficient amount for indicated testing frequency plus additional to accommodate PRN testing needs. Insurance preferred brand 50 strip 5    blood glucose monitor kit and supplies 1 kit by Other route 2 times daily DX:type 2 diabetes May substitute insurance preferred brand 1 kit 5     No current facility-administered medications for this visit.      Allergies   Allergen Reactions    Sulfa Antibiotics Hives    Metronidazole Nausea And Vomiting       Health Maintenance   Topic Date Due    DTaP/Tdap/Td vaccine (1 - Tdap) Never done    Shingles vaccine (1 of 2) Never done    Diabetic retinal exam  09/03/2021    Diabetic foot exam  10/21/2021    Annual Wellness Visit (AWV)  10/22/2021    Flu vaccine (1) 09/01/2022    Lipids  11/03/2022    Diabetic microalbuminuria test  11/15/2022    Breast cancer screen  12/20/2022    A1C test (Diabetic or Prediabetic)  07/07/2023    Depression Screen  07/07/2023    Colorectal Cancer Screen  10/18/2029    DEXA (modify frequency per FRAX score)  Completed    Pneumococcal 65+ years Vaccine  Completed  COVID-19 Vaccine  Completed    Hepatitis C screen  Completed    Hepatitis A vaccine  Aged Out    Hib vaccine  Aged Out    Meningococcal (ACWY) vaccine  Aged Out       Subjective:      Review of Systems    Objective:     /68 (Site: Right Upper Arm, Position: Sitting, Cuff Size: Large Adult)   Pulse 88   Temp 99.1 °F (37.3 °C) (Tympanic)   Wt 200 lb 6.4 oz (90.9 kg)   SpO2 97%   BMI 36.65 kg/m²     Physical Exam  Vitals and nursing note reviewed. Constitutional:       Appearance: Normal appearance. She is well-developed. She is obese. HENT:      Head: Normocephalic and atraumatic. Mouth/Throat:      Mouth: Mucous membranes are moist.   Eyes:      General: No scleral icterus. Conjunctiva/sclera: Conjunctivae normal.   Neck:      Thyroid: No thyromegaly. Vascular: No JVD. Cardiovascular:      Rate and Rhythm: Normal rate and regular rhythm. Heart sounds: Normal heart sounds. No murmur heard. No friction rub. No gallop. Pulmonary:      Effort: Pulmonary effort is normal. No respiratory distress. Breath sounds: Normal breath sounds. Abdominal:      General: Bowel sounds are normal.      Palpations: Abdomen is soft. There is no mass. Tenderness: There is abdominal tenderness. There is guarding. There is no right CVA tenderness or left CVA tenderness. Hernia: No hernia is present. Musculoskeletal:      Cervical back: Normal range of motion and neck supple. Lymphadenopathy:      Cervical: No cervical adenopathy. Skin:     General: Skin is warm. Capillary Refill: Capillary refill takes less than 2 seconds. Findings: No rash. Comments: No jaundice   Neurological:      Mental Status: She is alert and oriented to person, place, and time. Psychiatric:         Mood and Affect: Mood normal.         Thought Content: Thought content normal.         Judgment: Judgment normal.         Assessment/Plan:     1.  Right lower quadrant abdominal pain  -     ondansetron (ZOFRAN) 4 MG tablet; Take 1 tablet by mouth every 8 hours as needed for Nausea or Vomiting, Disp-20 tablet, R-0Normal  -     amoxicillin-clavulanate (AUGMENTIN) 875-125 MG per tablet; Take 1 tablet by mouth 2 times daily for 10 days, Disp-14 tablet, R-0Normal  2. Type 2 diabetes mellitus with complication, without long-term current use of insulin ([facility])  -     POCT glycosylated hemoglobin (Hb A1C)  3. Non-intractable vomiting with nausea, unspecified vomiting type  -     ondansetron (ZOFRAN) 4 MG tablet; Take 1 tablet by mouth every 8 hours as needed for Nausea or Vomiting, Disp-20 tablet, R-0Normal  -     amoxicillin-clavulanate (AUGMENTIN) 875-125 MG per tablet; Take 1 tablet by mouth 2 times daily for 10 days, Disp-14 tablet, R-0Normal    This could be a exacerbation of her previous diverticulitis but am concerned about possible obstruction from adhesions. Abdominal x-ray to rule out obstruction. Clear liquid diet for 24 hours. Start Augmentin for 1 week. Labs to rule out inflammation and infection. Symptoms not improving proceed to CT scan and consider surgical referral plus minus hospitalization. Lab Results   Component Value Date    WBC 10.9 (H) 07/07/2022    HGB 13.9 07/07/2022    HCT 45.2 07/07/2022    .3 07/07/2022    CHOL 260 (H) 11/03/2021    TRIG 296 (H) 11/03/2021    HDL 47 11/03/2021    ALT 18 07/07/2022    AST 21 07/07/2022     07/07/2022    K 4.0 07/07/2022     07/07/2022    CREATININE 0.9 07/07/2022    BUN 16 07/07/2022    CO2 24 07/07/2022    LABA1C 7.5 07/07/2022    LABA1C 6.8 11/15/2021    LABA1C 7.0 10/21/2020    LABMICR 2.6 (H) 11/15/2021       Return in about 11 days (around 7/18/2022). Multiple labs and other testing may have been ordered which may not be completely evident from the above note due to system interface incompatibilities. Patient given educational materials - see patientinstructions.   Discussed use, benefit, and side effects of prescribed medications. All patient questions answered. Pt voiced understanding. Reviewed health maintenance. Instructed to continue current medications, diet andexercise. Patient agreed with treatment plan. Follow up as directed.      (Please note that portions of this note were completed with a voice-recognition program. Efforts were made to edit the dictation but occasionally words are mis-transcribed.)    Electronically signed by Andres Herrera MD on 7/8/2022

## 2022-07-15 ENCOUNTER — TELEPHONE (OUTPATIENT)
Dept: FAMILY MEDICINE CLINIC | Age: 67
End: 2022-07-15

## 2022-07-20 ENCOUNTER — OFFICE VISIT (OUTPATIENT)
Dept: FAMILY MEDICINE CLINIC | Age: 67
End: 2022-07-20
Payer: MEDICARE

## 2022-07-20 VITALS
DIASTOLIC BLOOD PRESSURE: 76 MMHG | HEIGHT: 62 IN | WEIGHT: 200.4 LBS | BODY MASS INDEX: 36.88 KG/M2 | OXYGEN SATURATION: 97 % | HEART RATE: 94 BPM | SYSTOLIC BLOOD PRESSURE: 112 MMHG

## 2022-07-20 DIAGNOSIS — R10.31 RIGHT LOWER QUADRANT ABDOMINAL PAIN: ICD-10-CM

## 2022-07-20 DIAGNOSIS — K57.92 DIVERTICULITIS: Primary | ICD-10-CM

## 2022-07-20 PROCEDURE — 99214 OFFICE O/P EST MOD 30 MIN: CPT | Performed by: FAMILY MEDICINE

## 2022-07-20 PROCEDURE — G8427 DOCREV CUR MEDS BY ELIG CLIN: HCPCS | Performed by: FAMILY MEDICINE

## 2022-07-20 PROCEDURE — G8417 CALC BMI ABV UP PARAM F/U: HCPCS | Performed by: FAMILY MEDICINE

## 2022-07-20 PROCEDURE — 1123F ACP DISCUSS/DSCN MKR DOCD: CPT | Performed by: FAMILY MEDICINE

## 2022-07-20 PROCEDURE — 1090F PRES/ABSN URINE INCON ASSESS: CPT | Performed by: FAMILY MEDICINE

## 2022-07-20 PROCEDURE — 99212 OFFICE O/P EST SF 10 MIN: CPT | Performed by: FAMILY MEDICINE

## 2022-07-20 PROCEDURE — 1036F TOBACCO NON-USER: CPT | Performed by: FAMILY MEDICINE

## 2022-07-20 PROCEDURE — 3017F COLORECTAL CA SCREEN DOC REV: CPT | Performed by: FAMILY MEDICINE

## 2022-07-20 PROCEDURE — G8400 PT W/DXA NO RESULTS DOC: HCPCS | Performed by: FAMILY MEDICINE

## 2022-07-20 NOTE — PROGRESS NOTES
1200 [facility]  [address]  Dept: [phone]  Dept QZB:[phone]    Queenie Rico is a 75 y.o. female who presents today for her medical conditions/complaints as notedbelow. Queenie Rico is c/o of Abdominal Pain (2 wk f/u)        Assessment/Plan:     1. Diverticulitis  Comments:  history of recurrent diverticulitis  Orders:  -     CT ABDOMEN PELVIS W IV CONTRAST Additional Contrast? Oral; Future  2. Right lower quadrant abdominal pain  -     CT ABDOMEN PELVIS W IV CONTRAST Additional Contrast? Oral; Future    Hold the metformin and see if the bowels improve at all. If the bowels are not improving by holding the metformin then increase the colestipol to twice daily. Will check the CT scan and if the bowels are not improving then would refer to the surgeon to evaluate ? Adhesions    Will need to consider alternative medication for the diabetes but at this point control is reasonable so we will continue work on diet and exercise until abdominal pain has been worked through. Lab Results   Component Value Date    WBC 10.9 (H) 07/07/2022    HGB 13.9 07/07/2022    HCT 45.2 07/07/2022    .3 07/07/2022    CHOL 260 (H) 11/03/2021    TRIG 296 (H) 11/03/2021    HDL 47 11/03/2021    ALT 18 07/07/2022    AST 21 07/07/2022     07/07/2022    K 4.0 07/07/2022     07/07/2022    CREATININE 0.9 07/07/2022    BUN 16 07/07/2022    CO2 24 07/07/2022    LABA1C 7.5 07/07/2022    LABA1C 6.8 11/15/2021    LABA1C 7.0 10/21/2020    LABMICR 2.6 (H) 11/15/2021       Return in about 3 months (around 10/20/2022) for Medication recheck. Subjective:      HPI:     HPI    At the last appointment Chandni Dennis was started on Augmentin for concern for a diverticulitis flareup. Acute abdominal series suggested a ileus but not a obstruction. Pain is 75% better.  Finished the augmentin-- had some diarrhea with this-- is bulkier than it was but is having some cramps. Wonders if the diarrhea will come back. Taking the colestipol. No fevers or chills. Does have some nausea on and off also. When she had her hysterectomy years ago and then later had the ovaries removed she was told she had severe adhesions on the right side and her pain on the right side was much better. Blood sugars have been \"fairly good\" most of the time are actually below 100.        BP Readings from Last 3 Encounters:   07/20/22 112/76   07/07/22 128/68   11/15/21 125/82          (goal 120/80)    Wt Readings from Last 3 Encounters:   07/20/22 200 lb 6.4 oz (90.9 kg)   07/07/22 200 lb 6.4 oz (90.9 kg)   11/15/21 201 lb (91.2 kg)        Past Medical History:   Diagnosis Date    Bone spur of foot     bilateral    Breast lump     left breast lump - benign (removed)    Diverticulitis of sigmoid colon 2001    Hyperlipidemia     Hypertension     Type 2 diabetes mellitus without complication ([facility] [address]       Past Surgical History:   Procedure Laterality Date    APPENDECTOMY      CERVICAL FUSION      C3-C5    CHOLECYSTECTOMY  08/16/2019    COLON SURGERY N/A 10/24/2001    Sigmoid colectomy for acute and chronic diverticulitis with obstruction    COLONOSCOPY  2003    diverticulitis    COLONOSCOPY  10/18/2019    FOOT SURGERY Bilateral     bone spurs in the feet    HYSTERECTOMY, TOTAL ABDOMINAL (CERVIX REMOVED)      NECK SURGERY      SALPINGO-OOPHORECTOMY  1990    Bilateral-- seperate from hyst with adhesion lysis       Family History   Problem Relation Age of Onset    Breast Cancer Mother     Diabetes Mother     Other Mother         diverticulitis (bowel resection)    Cancer Father         prostate, squamous cell Ca    Breast Cancer Sister         crohn    Breast Cancer Sister     Heart Attack Maternal Grandmother     Other Maternal Grandfather         diverticulitis colon resection    Cancer Paternal Grandmother     Cancer Paternal Grandfather        Social History     Tobacco Use Smoking status: Never    Smokeless tobacco: Never   Substance Use Topics    Alcohol use: No      Current Outpatient Medications   Medication Sig Dispense Refill    ondansetron (ZOFRAN) 4 MG tablet Take 1 tablet by mouth every 8 hours as needed for Nausea or Vomiting 20 tablet 0    rosuvastatin (CRESTOR) 10 MG tablet Take 1 tablet by mouth nightly 30 tablet 5    metoprolol succinate (TOPROL XL) 25 MG extended release tablet Take 1/2 (one-half) tablet by mouth twice daily 90 tablet 1    Lancets Micro Thin 33G MISC 1 each by Does not apply route daily And prn. Insurance preferred brand 100 each 3    blood glucose monitor strips 1 strip by Other route daily Test 1 times a day & as needed for symptoms of irregular blood glucose. Dispense sufficient amount for indicated testing frequency plus additional to accommodate PRN testing needs. Insurance preferred brand 50 strip 5    amLODIPine (NORVASC) 10 MG tablet Take 1 tablet by mouth once daily 90 tablet 3    glimepiride (AMARYL) 2 MG tablet Take 1 tablet by mouth every morning (before breakfast) 90 tablet 3    omeprazole (PRILOSEC) 20 MG delayed release capsule Take 1 capsule by mouth daily 90 capsule 3    hydroCHLOROthiazide (HYDRODIURIL) 25 MG tablet TAKE 1 TABLET BY MOUTH ONCE DAILY 90 tablet 3    colestipol (COLESTID) 1 g tablet Take 1 tablet by mouth daily 120 tablet 3    metFORMIN (GLUCOPHAGE) 500 MG tablet Take 1 tablet by mouth 2 times daily (Patient taking differently: Take 500 mg by mouth in the morning, at noon, and at bedtime) 360 tablet 3    blood glucose monitor kit and supplies 1 kit by Other route 2 times daily DX:type 2 diabetes May substitute insurance preferred brand 1 kit 5     No current facility-administered medications for this visit.      Allergies   Allergen Reactions    Sulfa Antibiotics Hives    Metronidazole Nausea And Vomiting       Health Maintenance   Topic Date Due    DTaP/Tdap/Td vaccine (1 - Tdap) Never done    Shingles vaccine (1 of 2) Never done    Diabetic retinal exam  09/03/2021    Diabetic foot exam  10/21/2021    Annual Wellness Visit (AWV)  10/22/2021    COVID-19 Vaccine (4 - Booster for Pfizer series) 03/18/2022    Flu vaccine (1) 09/01/2022    Lipids  11/03/2022    Diabetic microalbuminuria test  11/15/2022    Breast cancer screen  12/20/2022    A1C test (Diabetic or Prediabetic)  07/07/2023    Depression Screen  07/07/2023    Colorectal Cancer Screen  10/18/2029    DEXA (modify frequency per FRAX score)  Completed    Pneumococcal 65+ years Vaccine  Completed    Hepatitis C screen  Completed    Hepatitis A vaccine  Aged Out    Hib vaccine  Aged Out    Meningococcal (ACWY) vaccine  Aged Out         Review of Systems    Objective:     /76 (Site: Left Upper Arm, Position: Sitting, Cuff Size: Small Adult)   Pulse 94   Ht 5' 2\" (1.575 m)   Wt 200 lb 6.4 oz (90.9 kg)   SpO2 97%   BMI 36.65 kg/m²     Physical Exam  Vitals and nursing note reviewed. Constitutional:       Appearance: Normal appearance. She is well-developed. She is obese. HENT:      Head: Normocephalic and atraumatic. Mouth/Throat:      Mouth: Mucous membranes are moist.   Eyes:      General: No scleral icterus. Conjunctiva/sclera: Conjunctivae normal.   Neck:      Thyroid: No thyromegaly. Vascular: No JVD. Cardiovascular:      Rate and Rhythm: Normal rate and regular rhythm. Heart sounds: Normal heart sounds. No murmur heard. No friction rub. No gallop. Pulmonary:      Effort: Pulmonary effort is normal. No respiratory distress. Breath sounds: Normal breath sounds. Abdominal:      General: Bowel sounds are normal.      Palpations: Abdomen is soft. There is no mass. Tenderness: There is abdominal tenderness. There is guarding. There is no right CVA tenderness or left CVA tenderness. Hernia: No hernia is present. Musculoskeletal:      Cervical back: Normal range of motion and neck supple.    Lymphadenopathy:

## 2022-07-20 NOTE — PATIENT INSTRUCTIONS
Hold the metformin and see if the bowels improve at all. If the bowels are not improving by holding the metformin then increase the colestipol to twice daily.      Will check the CT scan and if the bowels are not improving then would refer to the surgeon to evaluate ? adhesions

## 2022-07-27 NOTE — RESULT ENCOUNTER NOTE
Patient notified by phone. She states she is feeling \"ok. \"  She did start back on metformin because her BS were running high. She is having diarrhea. She is having some pain but not the excruciating pain that she was having when she came in. She states after the finished the antibiotic she was having vaginal itching. She has been trying to get rid of it. She used monistat and vagisil and it has not gotten rid of it. Will you send in something for a yeast infection. [facility].

## 2022-07-28 ENCOUNTER — TELEPHONE (OUTPATIENT)
Dept: FAMILY MEDICINE CLINIC | Age: 67
End: 2022-07-28

## 2022-07-28 DIAGNOSIS — K57.92 DIVERTICULITIS: Primary | ICD-10-CM

## 2022-07-28 RX ORDER — FLUCONAZOLE 150 MG/1
TABLET ORAL
Qty: 7 TABLET | Refills: 0 | Status: SHIPPED | OUTPATIENT
Start: 2022-07-28 | End: 2022-07-31

## 2022-07-28 RX ORDER — AMOXICILLIN AND CLAVULANATE POTASSIUM 875; 125 MG/1; MG/1
1 TABLET, FILM COATED ORAL 2 TIMES DAILY
Qty: 20 TABLET | Refills: 0 | Status: SHIPPED | OUTPATIENT
Start: 2022-07-28 | End: 2022-08-07

## 2022-07-28 NOTE — TELEPHONE ENCOUNTER
----- Message from Servando Mijares LPN sent at 1/89/2827  9:59 AM EDT -----  Patient notified by phone. She states she is feeling \"ok. \"  She did start back on metformin because her BS were running high. She is having diarrhea. She is having some pain but not the excruciating pain that she was having when she came in. She states after the finished the antibiotic she was having vaginal itching. She has been trying to get rid of it. She used monistat and vagisil and it has not gotten rid of it. Will you send in something for a yeast infection. [facility].

## 2022-07-28 NOTE — TELEPHONE ENCOUNTER
Pt notified by phone-states her BS was 320 last night and was not feeling well.  She does not want her sugars to be this high and wonders what she can do since you do not want her to take the metformin

## 2022-07-28 NOTE — TELEPHONE ENCOUNTER
CT scan showed persistent diverticulitis for Berenice. I would like her to continue to hold the metformin for an additional 2 weeks, repeat the antibiotic course with the augmentin and will add in fluconazole for the yeast-- every 3 days until off of the antibiotics. Please have her let mw know how she is feeling after the 2nd round of the augmentin.

## 2022-07-29 NOTE — TELEPHONE ENCOUNTER
The blood sugars will start to come down when her infection is improved. This is a long term problem and we will definitely be working on it. I am hesitant to add another medication that can affect her bowels will retry to get her bowels better. If they are continue to creep up even higher than this have her let me know.

## 2022-10-12 ENCOUNTER — OFFICE VISIT (OUTPATIENT)
Dept: FAMILY MEDICINE CLINIC | Age: 67
End: 2022-10-12
Payer: MEDICARE

## 2022-10-12 VITALS
WEIGHT: 201 LBS | OXYGEN SATURATION: 97 % | HEIGHT: 62 IN | SYSTOLIC BLOOD PRESSURE: 126 MMHG | HEART RATE: 99 BPM | BODY MASS INDEX: 36.99 KG/M2 | DIASTOLIC BLOOD PRESSURE: 84 MMHG

## 2022-10-12 DIAGNOSIS — I10 HYPERTENSION, ESSENTIAL: ICD-10-CM

## 2022-10-12 DIAGNOSIS — E66.01 SEVERE OBESITY (BMI 35.0-39.9) WITH COMORBIDITY (HCC): ICD-10-CM

## 2022-10-12 DIAGNOSIS — Z23 NEED FOR IMMUNIZATION AGAINST INFLUENZA: ICD-10-CM

## 2022-10-12 DIAGNOSIS — Z51.81 MEDICATION MONITORING ENCOUNTER: ICD-10-CM

## 2022-10-12 DIAGNOSIS — E11.8 TYPE 2 DIABETES MELLITUS WITH COMPLICATION, WITHOUT LONG-TERM CURRENT USE OF INSULIN (HCC): ICD-10-CM

## 2022-10-12 DIAGNOSIS — E78.2 MIXED HYPERLIPIDEMIA: ICD-10-CM

## 2022-10-12 DIAGNOSIS — Z00.00 MEDICARE ANNUAL WELLNESS VISIT, SUBSEQUENT: Primary | ICD-10-CM

## 2022-10-12 LAB — HBA1C MFR BLD: 8.1 %

## 2022-10-12 PROCEDURE — 1090F PRES/ABSN URINE INCON ASSESS: CPT | Performed by: FAMILY MEDICINE

## 2022-10-12 PROCEDURE — G8417 CALC BMI ABV UP PARAM F/U: HCPCS | Performed by: FAMILY MEDICINE

## 2022-10-12 PROCEDURE — G0439 PPPS, SUBSEQ VISIT: HCPCS | Performed by: FAMILY MEDICINE

## 2022-10-12 PROCEDURE — 83036 HEMOGLOBIN GLYCOSYLATED A1C: CPT | Performed by: FAMILY MEDICINE

## 2022-10-12 PROCEDURE — 2022F DILAT RTA XM EVC RTNOPTHY: CPT | Performed by: FAMILY MEDICINE

## 2022-10-12 PROCEDURE — 3017F COLORECTAL CA SCREEN DOC REV: CPT | Performed by: FAMILY MEDICINE

## 2022-10-12 PROCEDURE — 1123F ACP DISCUSS/DSCN MKR DOCD: CPT | Performed by: FAMILY MEDICINE

## 2022-10-12 PROCEDURE — G8484 FLU IMMUNIZE NO ADMIN: HCPCS | Performed by: FAMILY MEDICINE

## 2022-10-12 PROCEDURE — G8427 DOCREV CUR MEDS BY ELIG CLIN: HCPCS | Performed by: FAMILY MEDICINE

## 2022-10-12 PROCEDURE — G8400 PT W/DXA NO RESULTS DOC: HCPCS | Performed by: FAMILY MEDICINE

## 2022-10-12 PROCEDURE — 3052F HG A1C>EQUAL 8.0%<EQUAL 9.0%: CPT | Performed by: FAMILY MEDICINE

## 2022-10-12 PROCEDURE — G0008 ADMIN INFLUENZA VIRUS VAC: HCPCS | Performed by: FAMILY MEDICINE

## 2022-10-12 PROCEDURE — 99214 OFFICE O/P EST MOD 30 MIN: CPT | Performed by: FAMILY MEDICINE

## 2022-10-12 PROCEDURE — 1036F TOBACCO NON-USER: CPT | Performed by: FAMILY MEDICINE

## 2022-10-12 PROCEDURE — 90694 VACC AIIV4 NO PRSRV 0.5ML IM: CPT | Performed by: FAMILY MEDICINE

## 2022-10-12 RX ORDER — AMLODIPINE BESYLATE 10 MG/1
TABLET ORAL
Qty: 90 TABLET | Refills: 3 | Status: SHIPPED | OUTPATIENT
Start: 2022-10-12

## 2022-10-12 RX ORDER — HYDROCHLOROTHIAZIDE 25 MG/1
TABLET ORAL
Qty: 90 TABLET | Refills: 3 | Status: SHIPPED | OUTPATIENT
Start: 2022-10-12

## 2022-10-12 RX ORDER — MONTELUKAST SODIUM 4 MG/1
1 TABLET, CHEWABLE ORAL DAILY
Qty: 120 TABLET | Refills: 3 | Status: SHIPPED | OUTPATIENT
Start: 2022-10-12

## 2022-10-12 RX ORDER — METOPROLOL SUCCINATE 25 MG/1
TABLET, EXTENDED RELEASE ORAL
Qty: 90 TABLET | Refills: 1 | Status: SHIPPED | OUTPATIENT
Start: 2022-10-12

## 2022-10-12 RX ORDER — GLIMEPIRIDE 2 MG/1
2 TABLET ORAL
Qty: 90 TABLET | Refills: 3 | Status: SHIPPED | OUTPATIENT
Start: 2022-10-12

## 2022-10-12 RX ORDER — LANCETS 33 GAUGE
1 EACH MISCELLANEOUS DAILY
Qty: 100 EACH | Refills: 3 | Status: SHIPPED | OUTPATIENT
Start: 2022-10-12

## 2022-10-12 RX ORDER — OMEPRAZOLE 20 MG/1
20 CAPSULE, DELAYED RELEASE ORAL DAILY
Qty: 90 CAPSULE | Refills: 3 | Status: SHIPPED | OUTPATIENT
Start: 2022-10-12

## 2022-10-12 RX ORDER — GLUCOSAMINE HCL/CHONDROITIN SU 500-400 MG
1 CAPSULE ORAL DAILY
Qty: 50 STRIP | Refills: 5 | Status: SHIPPED | OUTPATIENT
Start: 2022-10-12

## 2022-10-12 ASSESSMENT — PATIENT HEALTH QUESTIONNAIRE - PHQ9
SUM OF ALL RESPONSES TO PHQ QUESTIONS 1-9: 0
SUM OF ALL RESPONSES TO PHQ9 QUESTIONS 1 & 2: 0
2. FEELING DOWN, DEPRESSED OR HOPELESS: 0
SUM OF ALL RESPONSES TO PHQ QUESTIONS 1-9: 0
1. LITTLE INTEREST OR PLEASURE IN DOING THINGS: 0

## 2022-10-12 ASSESSMENT — LIFESTYLE VARIABLES
HOW MANY STANDARD DRINKS CONTAINING ALCOHOL DO YOU HAVE ON A TYPICAL DAY: PATIENT DOES NOT DRINK
HOW OFTEN DO YOU HAVE A DRINK CONTAINING ALCOHOL: NEVER

## 2022-10-12 NOTE — PATIENT INSTRUCTIONS
Start a psyllium supplement daily such as metamucil. Start slowly with a teaspoon then build up to a tablespoon tablespoon of the regular once daily (1 tsp if you use the sugar free) and then in 2 weeks increase to 2 tbs and up to twice daily if needed to regulate    May try the probiotic such as Diantha Bertoters Quadra 106, or Florastor or similar multi-strain probiotic blend for improving the GI symptoms. Personalized Preventive Plan for Neil Wen - 10/12/2022  Medicare offers a range of preventive health benefits. Some of the tests and screenings are paid in full while other may be subject to a deductible, co-insurance, and/or copay. Some of these benefits include a comprehensive review of your medical history including lifestyle, illnesses that may run in your family, and various assessments and screenings as appropriate. After reviewing your medical record and screening and assessments performed today your provider may have ordered immunizations, labs, imaging, and/or referrals for you. A list of these orders (if applicable) as well as your Preventive Care list are included within your After Visit Summary for your review. Other Preventive Recommendations:    A preventive eye exam performed by an eye specialist is recommended every 1-2 years to screen for glaucoma; cataracts, macular degeneration, and other eye disorders. A preventive dental visit is recommended every 6 months. Try to get at least 150 minutes of exercise per week or 10,000 steps per day on a pedometer . Order or download the FREE \"Exercise & Physical Activity: Your Everyday Guide\" from [employer]. Call 8-220.804.7462 or search [employer] online. You need 8827-5466 mg of calcium and 4628-4368 IU of vitamin D per day.  It is possible to meet your calcium requirement with diet alone, but a vitamin D supplement is usually necessary to meet this goal.  When exposed to the sun, use a sunscreen that protects against both UVA and UVB radiation with an SPF of 30 or greater. Reapply every 2 to 3 hours or after sweating, drying off with a towel, or swimming. Always wear a seat belt when traveling in a car. Always wear a helmet when riding a bicycle or motorcycle.

## 2022-10-12 NOTE — PROGRESS NOTES
Medicare Annual Wellness Visit    Tevin Holt is here for Medicare AWV and Diabetes    Assessment & Plan   Medicare annual wellness visit, subsequent  Type 2 diabetes mellitus with complication, without long-term current use of insulin ([facility])  -     POCT Hb A1C (glycosylated hemoglobin)  -      DIABETES FOOT EXAM  -     metoprolol succinate (TOPROL XL) 25 MG extended release tablet; Take 1/2 (one-half) tablet by mouth twice daily, Disp-90 tablet, R-1Normal  -     Lancets Micro Thin 33G MISC; 1 each by Does not apply route daily And prn. Insurance preferred brand, Disp-100 each, R-3Normal  -     glimepiride (AMARYL) 2 MG tablet; Take 1 tablet by mouth every morning (before breakfast), Disp-90 tablet, R-3Normal  -     blood glucose monitor strips; 1 strip by Other route daily Test 1 times a day & as needed for symptoms of irregular blood glucose. Dispense sufficient amount for indicated testing frequency plus additional to accommodate PRN testing needs. Insurance preferred brand, Other, DAILY Starting Wed 10/12/2022, Disp-50 strip, R-5, Normal  Severe obesity (BMI 35.0-39. 9) with comorbidity (HCC)  Arthritis of knee  Hypertension, essential  -     metoprolol succinate (TOPROL XL) 25 MG extended release tablet; Take 1/2 (one-half) tablet by mouth twice daily, Disp-90 tablet, R-1Normal  -     hydroCHLOROthiazide (HYDRODIURIL) 25 MG tablet; TAKE 1 TABLET BY MOUTH ONCE DAILY, Disp-90 tablet, R-3Please consider 90 day supplies to promote better adherenceNormal  -     amLODIPine (NORVASC) 10 MG tablet; Take 1 tablet by mouth once daily, Disp-90 tablet, R-3Normal  -     Magnesium; Future  Need for immunization against influenza  -     Influenza, FLUAD, (age 72 y+), IM, Preservative Free, 0.5 mL  Mixed hyperlipidemia  -     Lipid Panel; Future  Medication monitoring encounter  -     Vitamin B12; Future  -     Magnesium;  Future    Recommendations for Preventive Services Due: see orders and patient instructions/AVS.    Increase the activity in the next several months. With the increase in the metformin hopefully this will improve. Gradually increase the fiber in the diet-- fiber supplement. IF sx are not improving then would consider seeing surgery again to evaluate due to the recurrent episodes of diverticulitis. May try the probiotic such as Kathi Nickels Quadra 106, or Florastor or similar multi-strain probiotic blend for improving the GI symptoms. Hypertension and hyperlipidemia are both asymptomatic well-controlled at this time    Recommended screening schedule for the next 5-10 years is provided to the patient in written form: see Patient Instructions/AVS.     Return in about 4 months (around 2/12/2023). Subjective   The following acute and/or chronic problems were also addressed today:  April Quintanilla is a 75 y.o. female who presents for follow-up of hypertension, diabetes, and hyperlipidemia. She indicates that she is feeling well and denies any symptoms referable to her elevated blood pressure or diabetes. Specifically denies chest pain, palpitations, dyspnea, peripheral edema, thirst, frequent urination, and blurred vision. Current medication regimen is as listed below. She denies any side effects of medication, and has been taking it regularly. Home glucose readings have been running higher-- mya in the morning 170-180. Checks blood sugars once daily. Last eye exam was 9/20 with [facility] . Diabetic complications includenone. annes been exercising regularlynot as much this summer-- is planning on starting to walk daily. Hasbeen following a diabeticdiet. Is back on all 3 of the metformin. Is doing ok on it. Patient's complete Health Risk Assessment and screening values have been reviewed and are found in Flowsheets. The following problems were reviewed today and where indicated follow up appointments were made and/or referrals ordered.     Positive Risk Factor Screenings with Interventions:             General Health and ACP:  General  In general, how would you say your health is?: Good  In the past 7 days, have you experienced any of the following: New or Increased Pain, New or Increased Fatigue, Loneliness, Social Isolation, Stress or Anger?: No  Do you get the social and emotional support that you need?: Yes  Do you have a Living Will?: (!) No    Advance Directives       Power of Ruba Mullen Will ACP-Advance Directive ACP-Power of     Not on File Not on File Not on File Not on File        General Health Risk Interventions:  No Living Will: [address] addressed with patient today    Health Habits/Nutrition:  Physical Activity: Insufficiently Active    Days of Exercise per Week: 2 days    Minutes of Exercise per Session: 20 min     Have you lost any weight without trying in the past 3 months?: No  Body mass index: (!) 36.76  Have you seen the dentist within the past year?: (!) No  Health Habits/Nutrition Interventions:  Dental exam overdue:  patient encouraged to make appointment with his/her dentist     Safety:  Do you have working smoke detectors?: Yes  Do you have any tripping hazards - loose or unsecured carpets or rugs?: No  Do you have any tripping hazards - clutter in doorways, halls, or stairs?: No  Do you have either shower bars, grab bars, non-slip mats or non-slip surfaces in your shower or bathtub?: (!) No  Do all of your stairways have a railing or banister?: (!) No  Do you always fasten your seatbelt when you are in a car?: Yes  Safety Interventions:  Home safety tips provided        Wt Readings from Last 3 Encounters:   10/12/22 201 lb (91.2 kg)   07/20/22 200 lb 6.4 oz (90.9 kg)   07/07/22 200 lb 6.4 oz (90.9 kg)          Objective   Vitals:    10/12/22 1344   BP: 126/84   Site: Right Upper Arm   Position: Sitting   Cuff Size: Small Adult   Pulse: 99   SpO2: 97%   Weight: 201 lb (91.2 kg)   Height: 5' 2\" (1.575 m)      Body mass index is 36.76 kg/m². Physical Exam  Vitals and nursing note reviewed. Constitutional:       Appearance: Normal appearance. She is well-developed. She is obese. HENT:      Head: Normocephalic and atraumatic. Mouth/Throat:      Mouth: Mucous membranes are moist.   Eyes:      General: No scleral icterus. Conjunctiva/sclera: Conjunctivae normal.   Neck:      Thyroid: No thyromegaly. Vascular: No JVD. Cardiovascular:      Rate and Rhythm: Normal rate and regular rhythm. Heart sounds: Normal heart sounds. No murmur heard. No friction rub. No gallop. Pulmonary:      Effort: Pulmonary effort is normal. No respiratory distress. Breath sounds: Normal breath sounds. Abdominal:      General: Bowel sounds are normal.      Palpations: Abdomen is soft. There is no mass. Tenderness: There is abdominal tenderness. There is guarding. There is no right CVA tenderness or left CVA tenderness. Hernia: No hernia is present. Musculoskeletal:      Cervical back: Normal range of motion and neck supple. Comments: Very tender on the medial and lateral epicondyles on the right side reproduces her pain. Pronation and supination causes pain. No weakness no sensory deficit wrist flexion and extension also increased pain   Lymphadenopathy:      Cervical: No cervical adenopathy. Skin:     General: Skin is warm. Capillary Refill: Capillary refill takes less than 2 seconds. Findings: No rash. Comments: No jaundice   Neurological:      Mental Status: She is alert and oriented to person, place, and time. Psychiatric:         Mood and Affect: Mood normal.         Thought Content:  Thought content normal.         Judgment: Judgment normal.       Diabetic Foot Exam    Deformities: Yes  arthritic change-- left great toe  Pulses:  normal,   Edema: normal- mild edema on the right foot  Skin lesions: normal  Callous:  No  Nails: normal    Sensory exam  Monofilament Sensation Left Foot: 10/10   Monofilament Sensation Right Foot: 10/10          Allergies   Allergen Reactions    Sulfa Antibiotics Hives    Metronidazole Nausea And Vomiting     Prior to Visit Medications    Medication Sig Taking? Authorizing Provider   omeprazole (PRILOSEC) 20 MG delayed release capsule Take 1 capsule by mouth daily Yes Eve Shearer MD   metoprolol succinate (TOPROL XL) 25 MG extended release tablet Take 1/2 (one-half) tablet by mouth twice daily Yes Eve Shearer MD   Lancets Micro Thin 33G MISC 1 each by Does not apply route daily And prn. Insurance preferred brand Yes Eve Shearer MD   hydroCHLOROthiazide (HYDRODIURIL) 25 MG tablet TAKE 1 TABLET BY MOUTH ONCE DAILY Yes Eve Shearer MD   glimepiride (AMARYL) 2 MG tablet Take 1 tablet by mouth every morning (before breakfast) Yes Eve Shearer MD   colestipol (COLESTID) 1 g tablet Take 1 tablet by mouth daily Yes Eve Shearer MD   blood glucose monitor strips 1 strip by Other route daily Test 1 times a day & as needed for symptoms of irregular blood glucose. Dispense sufficient amount for indicated testing frequency plus additional to accommodate PRN testing needs.  Insurance preferred brand Yes Eve Shearer MD   amLODIPine (NORVASC) 10 MG tablet Take 1 tablet by mouth once daily Yes Eve Shearer MD   ondansetron (ZOFRAN) 4 MG tablet Take 1 tablet by mouth every 8 hours as needed for Nausea or Vomiting Yes Eve Shearer MD   rosuvastatin (CRESTOR) 10 MG tablet Take 1 tablet by mouth nightly Yes Eve Shearer MD   metFORMIN (GLUCOPHAGE) 500 MG tablet Take 1 tablet by mouth 2 times daily  Patient taking differently: Take 500 mg by mouth in the morning, at noon, and at bedtime Yes Eve Shearer MD   blood glucose monitor kit and supplies 1 kit by Other route 2 times daily DX:type 2 diabetes May substitute insurance preferred brand Yes Eve Shearer MD       CareTeam (Including outside providers/suppliers regularly involved in providing care):   Patient Care Team:  Elana Dominguez MD as PCP - Baltazar Garza MD as PCP - [facility] Empaneled Provider     Reviewed and updated this visit:  Tobacco  Allergies  Meds  Problems  Med Hx  Surg Hx  Soc Hx  Fam Hx

## 2022-11-11 DIAGNOSIS — E11.8 TYPE 2 DIABETES MELLITUS WITH COMPLICATION, WITHOUT LONG-TERM CURRENT USE OF INSULIN (HCC): ICD-10-CM

## 2022-11-14 RX ORDER — OMEPRAZOLE 20 MG/1
CAPSULE, DELAYED RELEASE ORAL
Qty: 90 CAPSULE | Refills: 0 | OUTPATIENT
Start: 2022-11-14

## 2022-11-14 RX ORDER — GLIMEPIRIDE 2 MG/1
TABLET ORAL
Qty: 90 TABLET | Refills: 0 | OUTPATIENT
Start: 2022-11-14

## 2022-12-11 DIAGNOSIS — E78.2 MIXED HYPERLIPIDEMIA: ICD-10-CM

## 2022-12-12 RX ORDER — ROSUVASTATIN CALCIUM 10 MG/1
10 TABLET, COATED ORAL NIGHTLY
Qty: 30 TABLET | Refills: 5 | Status: SHIPPED | OUTPATIENT
Start: 2022-12-12

## 2022-12-12 NOTE — TELEPHONE ENCOUNTER
Lucita Woods is requesting a refill on the following medication(s):  Requested Prescriptions     Pending Prescriptions Disp Refills    rosuvastatin (CRESTOR) 10 MG tablet [Pharmacy Med Name: Rosuvastatin Calcium 10 MG Oral Tablet] 30 tablet 0     Sig: Take 1 tablet by mouth nightly       Last Visit Date (If Applicable):  46/27/1904    Next Visit Date:    2/13/2023

## 2023-01-17 RX ORDER — MONTELUKAST SODIUM 4 MG/1
TABLET, CHEWABLE ORAL
Qty: 120 TABLET | Refills: 0 | OUTPATIENT
Start: 2023-01-17

## 2023-03-01 ENCOUNTER — TELEPHONE (OUTPATIENT)
Dept: FAMILY MEDICINE CLINIC | Age: 68
End: 2023-03-01

## 2023-03-01 DIAGNOSIS — E11.8 TYPE 2 DIABETES MELLITUS WITH COMPLICATION, WITHOUT LONG-TERM CURRENT USE OF INSULIN (HCC): ICD-10-CM

## 2023-03-01 DIAGNOSIS — K59.1 FUNCTIONAL DIARRHEA: Primary | ICD-10-CM

## 2023-03-01 RX ORDER — CHOLESTYRAMINE 4 G/9G
1 POWDER, FOR SUSPENSION ORAL 2 TIMES DAILY
Qty: 90 PACKET | Refills: 3 | Status: SHIPPED | OUTPATIENT
Start: 2023-03-01

## 2023-03-01 NOTE — TELEPHONE ENCOUNTER
Needs Metformin sent to [facility]. All area pharmacies are out of Colestipol. Is there anything you could prescribe instead? She had to cancel her appt due to her  and son's health emergencies. She will try to get rescheduled as soon as possible.

## 2023-03-01 NOTE — TELEPHONE ENCOUNTER
Refill for the metformin sent in. Change to the cholestyramine packets until colestipol is available.   I hope her family does well

## 2023-04-04 DIAGNOSIS — E11.8 TYPE 2 DIABETES MELLITUS WITH COMPLICATION, WITHOUT LONG-TERM CURRENT USE OF INSULIN (HCC): ICD-10-CM

## 2023-04-04 NOTE — TELEPHONE ENCOUNTER
Loy Ching is requesting a refill on the following medication(s):  Requested Prescriptions     Pending Prescriptions Disp Refills    metFORMIN (GLUCOPHAGE) 500 MG tablet [Pharmacy Med Name: metFORMIN HCl 500 MG Oral Tablet] 270 tablet 0     Sig: TAKE 1 TABLET BY MOUTH IN THE MORNING AND 1 AT NOON AND 1 AT BEDTIME       Last Visit Date (If Applicable):  51/60/6921    Next Visit Date:    Visit date not found

## 2023-04-05 NOTE — TELEPHONE ENCOUNTER
Spoke with pt and she has a lot going on with her  being ill and she states not a good time and will call back when it is

## 2023-04-13 DIAGNOSIS — E11.8 TYPE 2 DIABETES MELLITUS WITH COMPLICATION, WITHOUT LONG-TERM CURRENT USE OF INSULIN (HCC): ICD-10-CM

## 2023-04-13 DIAGNOSIS — I10 HYPERTENSION, ESSENTIAL: ICD-10-CM

## 2023-04-17 RX ORDER — METOPROLOL SUCCINATE 25 MG/1
TABLET, EXTENDED RELEASE ORAL
Qty: 90 TABLET | Refills: 0 | Status: SHIPPED | OUTPATIENT
Start: 2023-04-17

## 2023-04-17 NOTE — TELEPHONE ENCOUNTER
Lester Underwood is requesting a refill on the following medication(s):  Requested Prescriptions     Pending Prescriptions Disp Refills    metoprolol succinate (TOPROL XL) 25 MG extended release tablet [Pharmacy Med Name: Metoprolol Succinate ER 25 MG Oral Tablet Extended Release 24 Hour] 90 tablet 1     Sig: Take 1/2 (one-half) tablet by mouth twice daily       Last Visit Date (If Applicable):  86/01/4882    Next Visit Date:    Visit date not found

## 2023-05-04 ENCOUNTER — OFFICE VISIT (OUTPATIENT)
Dept: FAMILY MEDICINE CLINIC | Age: 68
End: 2023-05-04
Payer: MEDICARE

## 2023-05-04 VITALS
WEIGHT: 199 LBS | DIASTOLIC BLOOD PRESSURE: 70 MMHG | SYSTOLIC BLOOD PRESSURE: 120 MMHG | HEART RATE: 96 BPM | OXYGEN SATURATION: 97 % | BODY MASS INDEX: 36.4 KG/M2

## 2023-05-04 DIAGNOSIS — E11.8 TYPE 2 DIABETES MELLITUS WITH COMPLICATION, WITHOUT LONG-TERM CURRENT USE OF INSULIN (HCC): Primary | ICD-10-CM

## 2023-05-04 DIAGNOSIS — K57.92 DIVERTICULITIS: ICD-10-CM

## 2023-05-04 DIAGNOSIS — Z12.31 VISIT FOR SCREENING MAMMOGRAM: ICD-10-CM

## 2023-05-04 DIAGNOSIS — I10 HYPERTENSION, ESSENTIAL: ICD-10-CM

## 2023-05-04 DIAGNOSIS — E78.2 MIXED HYPERLIPIDEMIA: ICD-10-CM

## 2023-05-04 DIAGNOSIS — R22.31 MASS OF RIGHT UPPER EXTREMITY: ICD-10-CM

## 2023-05-04 DIAGNOSIS — R22.32 LOCALIZED SWELLING, MASS, OR LUMP OF LEFT UPPER EXTREMITY: ICD-10-CM

## 2023-05-04 DIAGNOSIS — K59.1 FUNCTIONAL DIARRHEA: ICD-10-CM

## 2023-05-04 LAB
ALBUMIN/GLOBULIN RATIO: 1.4 G/DL
ALBUMIN: 4.2 G/DL (ref 3.5–5)
ALP BLD-CCNC: 77 UNITS/L (ref 38–126)
ALT SERPL-CCNC: 20 UNITS/L (ref 4–35)
ANION GAP SERPL CALCULATED.3IONS-SCNC: 11.9 MMOL/L
AST SERPL-CCNC: 21 UNITS/L (ref 14–36)
BILIRUB SERPL-MCNC: 0.4 MG/DL (ref 0.2–1.3)
BUN BLDV-MCNC: 17 MG/DL (ref 7–17)
CALCIUM SERPL-MCNC: 9.9 MG/DL (ref 8.4–10.2)
CHLORIDE BLD-SCNC: 106 MMOL/L (ref 98–120)
CHOLESTEROL/HDL RATIO: 2.75 RATIO (ref 0–4.5)
CHOLESTEROL: 140 MG/DL (ref 50–200)
CO2: 26 MMOL/L (ref 22–31)
CREAT SERPL-MCNC: 0.8 MG/DL (ref 0.5–1)
GFR CALCULATED: > 60
GLOBULIN: 3 G/DL
GLUCOSE: 251 MG/DL (ref 65–105)
HBA1C MFR BLD: 8.3 %
HDLC SERPL-MCNC: 51 MG/DL (ref 36–68)
LDL CHOLESTEROL CALCULATED: 40.2 MG/DL (ref 0–160)
MAGNESIUM: 1.5 MG/DL (ref 1.6–2.3)
POTASSIUM SERPL-SCNC: 4.9 MMOL/L (ref 3.6–5)
SODIUM BLD-SCNC: 139 MMOL/L (ref 135–145)
TOTAL PROTEIN, SERUM: 7.3 G/DL (ref 6.3–8.2)
TRIGL SERPL-MCNC: 244 MG/DL (ref 10–250)
VITAMIN B-12: 436 PG/ML (ref 239–931)
VLDLC SERPL CALC-MCNC: 48.8 MG/DL (ref 0–50)

## 2023-05-04 PROCEDURE — 99213 OFFICE O/P EST LOW 20 MIN: CPT | Performed by: FAMILY MEDICINE

## 2023-05-04 PROCEDURE — 83036 HEMOGLOBIN GLYCOSYLATED A1C: CPT | Performed by: FAMILY MEDICINE

## 2023-05-04 RX ORDER — ROSUVASTATIN CALCIUM 10 MG/1
10 TABLET, COATED ORAL NIGHTLY
Qty: 90 TABLET | Refills: 3 | Status: SHIPPED | OUTPATIENT
Start: 2023-05-04

## 2023-05-04 RX ORDER — ONDANSETRON 4 MG/1
4 TABLET, ORALLY DISINTEGRATING ORAL 3 TIMES DAILY PRN
Qty: 21 TABLET | Refills: 0 | Status: SHIPPED | OUTPATIENT
Start: 2023-05-04

## 2023-05-04 RX ORDER — METOPROLOL SUCCINATE 25 MG/1
25 TABLET, EXTENDED RELEASE ORAL DAILY
Qty: 90 TABLET | Refills: 3 | Status: SHIPPED | OUTPATIENT
Start: 2023-05-04

## 2023-05-04 RX ORDER — FLUCONAZOLE 150 MG/1
TABLET ORAL
Qty: 10 TABLET | Refills: 1 | Status: SHIPPED | OUTPATIENT
Start: 2023-05-04 | End: 2023-05-07

## 2023-05-04 RX ORDER — DAPAGLIFLOZIN 5 MG/1
5 TABLET, FILM COATED ORAL EVERY MORNING
Qty: 30 TABLET | Refills: 3 | Status: SHIPPED | OUTPATIENT
Start: 2023-05-04

## 2023-05-04 SDOH — ECONOMIC STABILITY: HOUSING INSECURITY
IN THE LAST 12 MONTHS, WAS THERE A TIME WHEN YOU DID NOT HAVE A STEADY PLACE TO SLEEP OR SLEPT IN A SHELTER (INCLUDING NOW)?: NO

## 2023-05-04 SDOH — ECONOMIC STABILITY: FOOD INSECURITY: WITHIN THE PAST 12 MONTHS, YOU WORRIED THAT YOUR FOOD WOULD RUN OUT BEFORE YOU GOT MONEY TO BUY MORE.: NEVER TRUE

## 2023-05-04 SDOH — ECONOMIC STABILITY: INCOME INSECURITY: HOW HARD IS IT FOR YOU TO PAY FOR THE VERY BASICS LIKE FOOD, HOUSING, MEDICAL CARE, AND HEATING?: NOT HARD AT ALL

## 2023-05-04 SDOH — ECONOMIC STABILITY: FOOD INSECURITY: WITHIN THE PAST 12 MONTHS, THE FOOD YOU BOUGHT JUST DIDN'T LAST AND YOU DIDN'T HAVE MONEY TO GET MORE.: NEVER TRUE

## 2023-05-04 ASSESSMENT — PATIENT HEALTH QUESTIONNAIRE - PHQ9
SUM OF ALL RESPONSES TO PHQ QUESTIONS 1-9: 0
1. LITTLE INTEREST OR PLEASURE IN DOING THINGS: 0
2. FEELING DOWN, DEPRESSED OR HOPELESS: 0
SUM OF ALL RESPONSES TO PHQ QUESTIONS 1-9: 0
SUM OF ALL RESPONSES TO PHQ QUESTIONS 1-9: 0
SUM OF ALL RESPONSES TO PHQ9 QUESTIONS 1 & 2: 0
SUM OF ALL RESPONSES TO PHQ QUESTIONS 1-9: 0

## 2023-05-04 NOTE — PROGRESS NOTES
[address] [facility]  [address]  Dept: [phone]  Dept PNB:[phone]    Veronica Guerrero is a 70 y.o. female who presents today for her medical conditions/complaints as notedbelow. Veronica Guerrero is c/o of Diabetes (6mo follow up)    Assessment/Plan:     1. Type 2 diabetes mellitus with complication, without long-term current use of insulin (HCC)  -     POCT glycosylated hemoglobin (Hb A1C)  -     metoprolol succinate (TOPROL XL) 25 MG extended release tablet; Take 1 tablet by mouth daily 1/2 tab BID, Disp-90 tablet, R-3Normal  -     metFORMIN (GLUCOPHAGE) 500 MG tablet; Take 1 tablet by mouth in the morning, at noon, and at bedtime, Disp-270 tablet, R-3Normal  -     FARXIGA 5 MG tablet; Take 1 tablet by mouth every morning, Disp-30 tablet, R-3, DAWNormal  -     fluconazole (DIFLUCAN) 150 MG tablet; One tablet every 3 days as needed for recurrent yeast, Disp-10 tablet, R-1Normal  2. Hypertension, essential  -     metoprolol succinate (TOPROL XL) 25 MG extended release tablet; Take 1 tablet by mouth daily 1/2 tab BID, Disp-90 tablet, R-3Normal  3. Mixed hyperlipidemia  -     rosuvastatin (CRESTOR) 10 MG tablet; Take 1 tablet by mouth nightly, Disp-90 tablet, R-3Normal  4. Functional diarrhea  -     ondansetron (ZOFRAN-ODT) 4 MG disintegrating tablet; Take 1 tablet by mouth 3 times daily as needed for Nausea or Vomiting, Disp-21 tablet, R-0Normal  5. Diverticulitis  -     ondansetron (ZOFRAN-ODT) 4 MG disintegrating tablet; Take 1 tablet by mouth 3 times daily as needed for Nausea or Vomiting, Disp-21 tablet, R-0Normal  6. Visit for screening mammogram  -     [facility] VICKY DIGITAL SCREEN BILATERAL; Future  7. Mass of right upper extremity  8. Localized swelling, mass, or lump of left upper extremity  -     US EXTREMITY LEFT NON VASC LIMITED;  Future    Start the farxiga at the 5 mg daily and increase in a month to the 10 mg daily-- stop the amaryl to

## 2023-05-05 NOTE — RESULT ENCOUNTER NOTE
Please let patient know her labs are all in the normal range. Except her magnesium. This is still low and lower than previously. I would like her to start a magnesium supplement. Start at the very lowest dose. This would be 200 mg. Look for the citrate or gluconate rather than the oxide as a citrate or gluconate will be much better tolerated.

## 2023-05-10 ENCOUNTER — TELEPHONE (OUTPATIENT)
Dept: FAMILY MEDICINE CLINIC | Age: 68
End: 2023-05-10

## 2023-05-10 NOTE — TELEPHONE ENCOUNTER
Patient called and said since her [employer] is very expensive, so she contacted the company and they are going to assist her. They are faxing paperwork that needs completed/signed and to just let the patient know if we receive that.

## 2023-05-12 DIAGNOSIS — E11.8 TYPE 2 DIABETES MELLITUS WITH COMPLICATION, WITHOUT LONG-TERM CURRENT USE OF INSULIN (HCC): ICD-10-CM

## 2023-05-12 RX ORDER — DAPAGLIFLOZIN 5 MG/1
5 TABLET, FILM COATED ORAL EVERY MORNING
Qty: 90 TABLET | Refills: 3 | Status: SHIPPED | OUTPATIENT
Start: 2023-05-12

## 2023-05-12 NOTE — TELEPHONE ENCOUNTER
Padmini Holguin is requesting a refill on the following medication(s):  Requested Prescriptions     Pending Prescriptions Disp Refills    FARXIGA 5 MG tablet 90 tablet 3     Sig: Take 1 tablet by mouth every morning       Last Visit Date (If Applicable):  4[birth year]9/1952    Next Visit Date:    10/19/2023          Need printed rx for patient assistance

## 2023-05-31 ENCOUNTER — OFFICE VISIT (OUTPATIENT)
Dept: FAMILY MEDICINE CLINIC | Age: 68
End: 2023-05-31
Payer: MEDICARE

## 2023-05-31 VITALS
DIASTOLIC BLOOD PRESSURE: 74 MMHG | WEIGHT: 198 LBS | HEART RATE: 91 BPM | BODY MASS INDEX: 36.21 KG/M2 | SYSTOLIC BLOOD PRESSURE: 136 MMHG | OXYGEN SATURATION: 97 %

## 2023-05-31 DIAGNOSIS — K57.32 DIVERTICULITIS OF LARGE INTESTINE WITHOUT PERFORATION OR ABSCESS WITHOUT BLEEDING: Primary | ICD-10-CM

## 2023-05-31 PROCEDURE — G8417 CALC BMI ABV UP PARAM F/U: HCPCS | Performed by: FAMILY MEDICINE

## 2023-05-31 PROCEDURE — 3078F DIAST BP <80 MM HG: CPT | Performed by: FAMILY MEDICINE

## 2023-05-31 PROCEDURE — 1090F PRES/ABSN URINE INCON ASSESS: CPT | Performed by: FAMILY MEDICINE

## 2023-05-31 PROCEDURE — G8400 PT W/DXA NO RESULTS DOC: HCPCS | Performed by: FAMILY MEDICINE

## 2023-05-31 PROCEDURE — 1036F TOBACCO NON-USER: CPT | Performed by: FAMILY MEDICINE

## 2023-05-31 PROCEDURE — G8427 DOCREV CUR MEDS BY ELIG CLIN: HCPCS | Performed by: FAMILY MEDICINE

## 2023-05-31 PROCEDURE — 1123F ACP DISCUSS/DSCN MKR DOCD: CPT | Performed by: FAMILY MEDICINE

## 2023-05-31 PROCEDURE — 99214 OFFICE O/P EST MOD 30 MIN: CPT | Performed by: FAMILY MEDICINE

## 2023-05-31 PROCEDURE — 3074F SYST BP LT 130 MM HG: CPT | Performed by: FAMILY MEDICINE

## 2023-05-31 PROCEDURE — 3017F COLORECTAL CA SCREEN DOC REV: CPT | Performed by: FAMILY MEDICINE

## 2023-05-31 RX ORDER — TRAMADOL HYDROCHLORIDE 50 MG/1
50 TABLET ORAL EVERY 4 HOURS PRN
Qty: 12 TABLET | Refills: 0 | Status: SHIPPED | OUTPATIENT
Start: 2023-05-31 | End: 2023-06-03

## 2023-05-31 RX ORDER — AMOXICILLIN AND CLAVULANATE POTASSIUM 875; 125 MG/1; MG/1
1 TABLET, FILM COATED ORAL 2 TIMES DAILY
Qty: 20 TABLET | Refills: 0 | Status: SHIPPED | OUTPATIENT
Start: 2023-05-31 | End: 2023-06-10

## 2023-05-31 NOTE — PROGRESS NOTES
[address] [facility]  [address]  Dept: [phone]  Dept Z:[phone]    Nabila Siegel is a 70 y.o. female who presents today for her medical conditions/complaints as notedbelow. Nabila Siegel is c/o of Abdominal Pain (Lower right side abd pain - pain is in her back also. Started 5/25/23. Nausea, vomiting, and diarrhea for the past week. Feels bloated. Pain is worse when bending over. )      Assessment/Plan:     1. Diverticulitis of large intestine without perforation or abscess without bleeding  -     amoxicillin-clavulanate (AUGMENTIN) 875-125 MG per tablet; Take 1 tablet by mouth 2 times daily for 10 days, Disp-20 tablet, R-0Normal  -     CT ABDOMEN PELVIS W IV CONTRAST Additional Contrast? Oral; Future  -     traMADol (ULTRAM) 50 MG tablet; Take 1 tablet by mouth every 4 hours as needed for Pain for up to 3 days. Intended supply: 3 days. Take lowest dose possible to manage pain Max Daily Amount: 300 mg, Disp-12 tablet, R-0Normal    Restart the augmentin which she has tolerated well in the past.  Tramadol for pain and soft, light diet. If not improving in the next few days then to call and would consider CT, CBC and IV antibiotics as has been needed in the past.  Discussed again recommendation for surgical evaluation for the recurrent episodes of diverticulitis. Will refer to surgery for evaluation and consider of repeat colonoscopy.     Lab Results   Component Value Date    WBC 10.9 (H) 07/07/2022    HGB 13.9 07/07/2022    HCT 45.2 07/07/2022    .3 07/07/2022    CHOL 140 05/04/2023    TRIG 244 05/04/2023    HDL 51 05/04/2023    ALT 20 05/04/2023    AST 21 05/04/2023     05/04/2023    K 4.9 05/04/2023     05/04/2023    CREATININE 0.8 05/04/2023    BUN 17 05/04/2023    CO2 26 05/04/2023    LABA1C 8.3 05/04/2023    LABA1C 8.1 10/12/2022    LABA1C 7.5 07/07/2022    LABMICR 2.6 (H) 11/15/2021       No follow-ups on

## 2023-08-08 ENCOUNTER — TELEPHONE (OUTPATIENT)
Dept: FAMILY MEDICINE CLINIC | Age: 68
End: 2023-08-08

## 2023-08-08 DIAGNOSIS — E11.8 TYPE 2 DIABETES MELLITUS WITH COMPLICATION, WITHOUT LONG-TERM CURRENT USE OF INSULIN (HCC): ICD-10-CM

## 2023-08-08 NOTE — TELEPHONE ENCOUNTER
Patient called stating blood sugar over the weekend was running really high. 419 and 500 Sunday evening after supper. She had subway for supper. Last night 252, 327, 371, 365. Fasting this morning 192. She said you discussed increasing farxiga to 10 mg. Do you want her to increase this? If it is increased would need a new rx sent to patient assistance. She also wonders about getting a CGM. Patient aware doctor is out of the office until the end of the week.

## 2023-08-10 RX ORDER — DAPAGLIFLOZIN 10 MG/1
10 TABLET, FILM COATED ORAL EVERY MORNING
Qty: 90 TABLET | Refills: 3 | Status: SHIPPED | OUTPATIENT
Start: 2023-08-10

## 2023-08-10 NOTE — TELEPHONE ENCOUNTER
Yes please-- increase the farxiga to the 10 mg daily. Make sure watching the diet-- only eat 1/2 the bun or better yet have the salad or wrap. Please send new script but can take 2 of the 5 until the higher dose available.

## 2023-10-04 NOTE — TELEPHONE ENCOUNTER
Kurt Villagomez is requesting a refill on the following medication(s):  Requested Prescriptions     Pending Prescriptions Disp Refills    omeprazole (PRILOSEC) 20 MG delayed release capsule [Pharmacy Med Name: Omeprazole 20 MG Oral Capsule Delayed Release] 90 capsule 0     Sig: Take 1 capsule by mouth once daily       Last Visit Date (If Applicable):  7/44/1875    Next Visit Date:    10/19/2023

## 2023-10-05 RX ORDER — OMEPRAZOLE 20 MG/1
20 CAPSULE, DELAYED RELEASE ORAL DAILY
Qty: 90 CAPSULE | Refills: 1 | Status: SHIPPED | OUTPATIENT
Start: 2023-10-05

## 2023-10-19 ENCOUNTER — OFFICE VISIT (OUTPATIENT)
Dept: FAMILY MEDICINE CLINIC | Age: 68
End: 2023-10-19
Payer: MEDICARE

## 2023-10-19 VITALS
HEART RATE: 85 BPM | WEIGHT: 189 LBS | SYSTOLIC BLOOD PRESSURE: 132 MMHG | BODY MASS INDEX: 34.78 KG/M2 | DIASTOLIC BLOOD PRESSURE: 80 MMHG | HEIGHT: 62 IN | OXYGEN SATURATION: 97 %

## 2023-10-19 DIAGNOSIS — Z00.00 MEDICARE ANNUAL WELLNESS VISIT, SUBSEQUENT: Primary | ICD-10-CM

## 2023-10-19 DIAGNOSIS — E11.8 TYPE 2 DIABETES MELLITUS WITH COMPLICATION, WITHOUT LONG-TERM CURRENT USE OF INSULIN (HCC): ICD-10-CM

## 2023-10-19 DIAGNOSIS — R00.2 PALPITATIONS: ICD-10-CM

## 2023-10-19 DIAGNOSIS — I10 HYPERTENSION, ESSENTIAL: ICD-10-CM

## 2023-10-19 DIAGNOSIS — R53.82 CHRONIC FATIGUE: ICD-10-CM

## 2023-10-19 DIAGNOSIS — E78.2 MIXED HYPERLIPIDEMIA: ICD-10-CM

## 2023-10-19 DIAGNOSIS — Z23 NEED FOR INFLUENZA VACCINATION: ICD-10-CM

## 2023-10-19 LAB
BASOPHILS %: 1.03 (ref 0–3)
BASOPHILS ABSOLUTE: 0.12 (ref 0–0.3)
EOSINOPHILS %: 5.49 (ref 0–10)
EOSINOPHILS ABSOLUTE: 0.65 (ref 0–1.1)
HBA1C MFR BLD: 9.2 %
HCT VFR BLD CALC: 45.1 % (ref 37–47)
HEMOGLOBIN: 14.7 (ref 12–16)
LYMPHOCYTE %: 20.38 (ref 20–51.1)
LYMPHOCYTES ABSOLUTE: 2.42 (ref 1–5.5)
MAGNESIUM: 1.9 MG/DL (ref 1.6–2.3)
MCH RBC QN AUTO: 28 PG (ref 28.5–32.5)
MCHC RBC AUTO-ENTMCNC: 32.6 G/DL (ref 32–37)
MCV RBC AUTO: 85.8 FL (ref 80–94)
MONOCYTES %: 7.18 (ref 1.7–9.3)
MONOCYTES ABSOLUTE: 0.85 (ref 0.1–1)
NEUTROPHILS %: 65.92 (ref 42.2–75.2)
NEUTROPHILS ABSOLUTE: 7.84 (ref 2–8.1)
PDW BLD-RTO: 12.4 % (ref 8.5–15.5)
PLATELET # BLD: 332.8 THOU/MM3 (ref 130–400)
RBC: 5.26 M/UL (ref 4.2–5.4)
TSH REFLEX FT4: 1.19 MIU/ML (ref 0.49–4.67)
VITAMIN B-12: 424 PG/ML (ref 239–931)
WBC: 11.9 THOU/ML3 (ref 4.8–10.8)

## 2023-10-19 PROCEDURE — 83036 HEMOGLOBIN GLYCOSYLATED A1C: CPT | Performed by: FAMILY MEDICINE

## 2023-10-19 PROCEDURE — 90694 VACC AIIV4 NO PRSRV 0.5ML IM: CPT | Performed by: FAMILY MEDICINE

## 2023-10-19 RX ORDER — DULAGLUTIDE 0.75 MG/.5ML
0.75 INJECTION, SOLUTION SUBCUTANEOUS WEEKLY
Qty: 2 ML | Refills: 3 | Status: SHIPPED | OUTPATIENT
Start: 2023-10-19 | End: 2023-10-19 | Stop reason: ALTCHOICE

## 2023-10-19 ASSESSMENT — PATIENT HEALTH QUESTIONNAIRE - PHQ9
SUM OF ALL RESPONSES TO PHQ QUESTIONS 1-9: 0
SUM OF ALL RESPONSES TO PHQ9 QUESTIONS 1 & 2: 0
SUM OF ALL RESPONSES TO PHQ QUESTIONS 1-9: 0
2. FEELING DOWN, DEPRESSED OR HOPELESS: 0
SUM OF ALL RESPONSES TO PHQ QUESTIONS 1-9: 0
SUM OF ALL RESPONSES TO PHQ QUESTIONS 1-9: 0
1. LITTLE INTEREST OR PLEASURE IN DOING THINGS: 0

## 2023-10-19 NOTE — PROGRESS NOTES
Have you had an allergic reaction to the flu (influenza) shot? No  Are you allergic to eggs or any component of the flu vaccine? No  Do you have a history of Guillain-[place] Syndrome (GBS), which is paralysis after receiving the flu vaccine? No  Are you feeling well today? Yes  Flu vaccine given as ordered. Patient tolerated it well. No questions re: VIS information. After obtaining consent, and per orders of , injection of FLU VACCINE given in Left vastus lateralis by Marianna Vazquez MA. Patient tolerated well. Patient instructed to remain in clinic for 20 minutes afterwards, and to report any adverse reaction immediately.
Sensation Left Foot: 10/10   Monofilament Sensation Right Foot: 10/10     Allergies   Allergen Reactions    Sulfa Antibiotics Hives    Metronidazole Nausea And Vomiting     Prior to Visit Medications    Medication Sig Taking? Authorizing Provider   Semaglutide,0.25 or 0.5MG/DOS, 2 MG/3ML SOPN Inject 0.25 mg into the skin once a week Yes Hebert Peñaloza MD   omeprazole (PRILOSEC) 20 MG delayed release capsule Take 1 capsule by mouth once daily Yes Hebert Peñaloza MD   FARXIGA 10 MG tablet Take 1 tablet by mouth every morning Yes Hebert Peñaloza MD   metoprolol succinate (TOPROL XL) 25 MG extended release tablet Take 1 tablet by mouth daily 1/2 tab BID Yes Sheeba Smith MD   metFORMIN (GLUCOPHAGE) 500 MG tablet Take 1 tablet by mouth in the morning, at noon, and at bedtime Yes Sheeba Smith MD   rosuvastatin (CRESTOR) 10 MG tablet Take 1 tablet by mouth nightly Yes Sheeba Smith MD   ondansetron (ZOFRAN-ODT) 4 MG disintegrating tablet Take 1 tablet by mouth 3 times daily as needed for Nausea or Vomiting Yes Hebert Peñaloza MD   cholestyramine [place] ) 4 g packet Take 1 packet by mouth 2 times daily Yes Hebert Peñaloza MD   amLODIPine (NORVASC) 10 MG tablet Take 1 tablet by mouth once daily Yes Hebert Peñaloza MD   Lancets Micro Thin 33G MISC 1 each by Does not apply route daily And prn. Insurance preferred brand  Hebert Peñaloza MD   blood glucose monitor strips 1 strip by Other route daily Test 1 times a day & as needed for symptoms of irregular blood glucose. Dispense sufficient amount for indicated testing frequency plus additional to accommodate PRN testing needs.  Insurance preferred brand  Hebert Peñaloza MD   blood glucose monitor kit and supplies 1 kit by Other route 2 times daily DX:type 2 diabetes May substitute insurance preferred brand  Hebert Peñaloza MD       [facility] (Including outside providers/suppliers regularly involved in providing care):   Patient Care

## 2023-11-06 ENCOUNTER — TELEPHONE (OUTPATIENT)
Dept: FAMILY MEDICINE CLINIC | Age: 68
End: 2023-11-06

## 2023-11-06 NOTE — TELEPHONE ENCOUNTER
Patient states the ozempic caused constipation and abdominal pain. She had to take stool softeners. She does not want to do that. She worries about the constipation especially with her diverticulitis. Trulicity will cost her 200 a month. She cannot afford that. There is not a patient assistance program for trulicity. Her sister is [place] and she wonders if this is an option for her. It does have a patient assistance program. Please advise.

## 2023-11-06 NOTE — TELEPHONE ENCOUNTER
It usually takes several weeks for the Ozempic to start to show improvements in the blood sugars. It would be a very gradual reduction. Especially since she is only on the initial low-dose. If it is causing severe abdominal issues she can try holding it for a week or 2 and then try it again making sure she eats very small portions low-fat and it does usually get better after the first week or 2 of being on it. If she is just not able to tolerate it we could try Trulicity instead. Some people will tolerate 1 versus the other better. We will still need to see if we can get insurance coverage. It does say in the computer that it is on her preferred less but at a higher tier.

## 2023-11-06 NOTE — TELEPHONE ENCOUNTER
Pt called and states that since she has started the Ozempic she has had really bad stomach pain and very nauseas. Pt states that she did not take any yesterday. Pt mentions that her insurance does not cover the Ozempic. She states that she has tried to go through patient assist. Pt also mentions that she does not feel that this is bringing her sugar down.

## 2023-11-06 NOTE — TELEPHONE ENCOUNTER
Yes, that is an option but that is an insulin. I would like her to come in so we can talk about this as we start. There are more things we need to discuss with insulin.

## 2023-11-09 ENCOUNTER — OFFICE VISIT (OUTPATIENT)
Dept: FAMILY MEDICINE CLINIC | Age: 68
End: 2023-11-09
Payer: MEDICARE

## 2023-11-09 VITALS
HEART RATE: 88 BPM | OXYGEN SATURATION: 97 % | DIASTOLIC BLOOD PRESSURE: 76 MMHG | WEIGHT: 189 LBS | BODY MASS INDEX: 34.57 KG/M2 | SYSTOLIC BLOOD PRESSURE: 130 MMHG

## 2023-11-09 DIAGNOSIS — Z79.4 DIABETES MELLITUS TYPE 2, INSULIN DEPENDENT (HCC): Primary | ICD-10-CM

## 2023-11-09 DIAGNOSIS — E11.9 DIABETES MELLITUS TYPE 2, INSULIN DEPENDENT (HCC): Primary | ICD-10-CM

## 2023-11-09 PROCEDURE — 99212 OFFICE O/P EST SF 10 MIN: CPT | Performed by: FAMILY MEDICINE

## 2023-11-09 PROCEDURE — G8400 PT W/DXA NO RESULTS DOC: HCPCS | Performed by: FAMILY MEDICINE

## 2023-11-09 PROCEDURE — G8417 CALC BMI ABV UP PARAM F/U: HCPCS | Performed by: FAMILY MEDICINE

## 2023-11-09 PROCEDURE — 3078F DIAST BP <80 MM HG: CPT | Performed by: FAMILY MEDICINE

## 2023-11-09 PROCEDURE — 3046F HEMOGLOBIN A1C LEVEL >9.0%: CPT | Performed by: FAMILY MEDICINE

## 2023-11-09 PROCEDURE — 2022F DILAT RTA XM EVC RTNOPTHY: CPT | Performed by: FAMILY MEDICINE

## 2023-11-09 PROCEDURE — 1036F TOBACCO NON-USER: CPT | Performed by: FAMILY MEDICINE

## 2023-11-09 PROCEDURE — 3017F COLORECTAL CA SCREEN DOC REV: CPT | Performed by: FAMILY MEDICINE

## 2023-11-09 PROCEDURE — 1123F ACP DISCUSS/DSCN MKR DOCD: CPT | Performed by: FAMILY MEDICINE

## 2023-11-09 PROCEDURE — G8427 DOCREV CUR MEDS BY ELIG CLIN: HCPCS | Performed by: FAMILY MEDICINE

## 2023-11-09 PROCEDURE — 3075F SYST BP GE 130 - 139MM HG: CPT | Performed by: FAMILY MEDICINE

## 2023-11-09 PROCEDURE — 99214 OFFICE O/P EST MOD 30 MIN: CPT | Performed by: FAMILY MEDICINE

## 2023-11-09 PROCEDURE — G8484 FLU IMMUNIZE NO ADMIN: HCPCS | Performed by: FAMILY MEDICINE

## 2023-11-09 PROCEDURE — 1090F PRES/ABSN URINE INCON ASSESS: CPT | Performed by: FAMILY MEDICINE

## 2023-11-09 RX ORDER — INSULIN GLARGINE 300 U/ML
10 INJECTION, SOLUTION SUBCUTANEOUS DAILY
Qty: 1 ADJUSTABLE DOSE PRE-FILLED PEN SYRINGE | Refills: 0 | Status: SHIPPED | COMMUNITY
Start: 2023-11-09

## 2023-11-09 RX ORDER — LANCETS 33 GAUGE
1 EACH MISCELLANEOUS DAILY
Qty: 100 EACH | Refills: 3 | Status: SHIPPED | OUTPATIENT
Start: 2023-11-09

## 2023-11-09 NOTE — PATIENT INSTRUCTIONS
Stat 10 units daily. When you receive your continuous monitor call to get a time set up with the nurse to learn to use it. In the meantime continue to check the sugars a few times per day and write this down. Watch for low sugars.

## 2023-11-29 DIAGNOSIS — I10 HYPERTENSION, ESSENTIAL: ICD-10-CM

## 2023-11-29 NOTE — TELEPHONE ENCOUNTER
Jackie Branch is requesting a refill on the following medication(s):  Requested Prescriptions     Pending Prescriptions Disp Refills    amLODIPine (NORVASC) 10 MG tablet [Pharmacy Med Name: amLODIPine Besylate 10 MG Oral Tablet] 90 tablet 0     Sig: Take 1 tablet by mouth once daily       Last Visit Date (If Applicable):  11/5/8317    Next Visit Date:    1/22/2024

## 2023-11-30 RX ORDER — AMLODIPINE BESYLATE 10 MG/1
TABLET ORAL
Qty: 90 TABLET | Refills: 3 | Status: SHIPPED | OUTPATIENT
Start: 2023-11-30

## 2023-12-05 ENCOUNTER — NURSE ONLY (OUTPATIENT)
Dept: FAMILY MEDICINE CLINIC | Age: 68
End: 2023-12-05
Payer: MEDICARE

## 2023-12-05 VITALS — OXYGEN SATURATION: 97 % | BODY MASS INDEX: 34.56 KG/M2 | HEART RATE: 85 BPM | WEIGHT: 188.93 LBS

## 2023-12-05 DIAGNOSIS — Z79.4 DIABETES MELLITUS TYPE 2, INSULIN DEPENDENT (HCC): Primary | ICD-10-CM

## 2023-12-05 DIAGNOSIS — E11.9 DIABETES MELLITUS TYPE 2, INSULIN DEPENDENT (HCC): Primary | ICD-10-CM

## 2023-12-05 PROCEDURE — 99211 OFF/OP EST MAY X REQ PHY/QHP: CPT | Performed by: FAMILY MEDICINE

## 2023-12-05 PROCEDURE — PBSHW PBB SHADOW CHARGE: Performed by: FAMILY MEDICINE

## 2023-12-05 NOTE — PROGRESS NOTES
Patient here for CGM training. CGM - Elinor 3   DME supplier- [employer]     Patient brought CGM reader and sensors to appointment. Education regarding CGM was provided to patient and daughter. Quick reference guide reviewed with patient. Patient successfully applied CGM Sensor. CGM reader was set up. CGM reader screen settings reviewed with patient. All questions were answered. Nurse visit in 1 month for CGM download. Patient will call with any questions and use fingerstick as backup if necessary. Education regarding removal of sensor or any imaging given to patient. Discussed with patient the importance of performing fingerstick glucose if CGM sensor warrants. Instructed patient to perform fingerstick glucose if symptoms do not match greater glucose. Informed patient to call company if sensor falls of prior to 2 weeks or is malfunctioning. Call DME supplier for refills as needed. All  educational material for CGM reviewed and sent with patient     Answered all patient questions. Agrees to follow plan follow up in 1 month for CGM download, sooner if needed. Call office if unexplained blood sugars less than 70 occur or above 400. Call office or access Post-i with any further questions or concerns. Be sure to bring glucometer/food log to next appointment.

## 2024-01-22 ENCOUNTER — OFFICE VISIT (OUTPATIENT)
Dept: FAMILY MEDICINE CLINIC | Age: 69
End: 2024-01-22
Payer: MEDICARE

## 2024-01-22 VITALS
HEART RATE: 88 BPM | WEIGHT: 184 LBS | SYSTOLIC BLOOD PRESSURE: 122 MMHG | BODY MASS INDEX: 33.65 KG/M2 | DIASTOLIC BLOOD PRESSURE: 64 MMHG | OXYGEN SATURATION: 97 %

## 2024-01-22 DIAGNOSIS — E11.8 TYPE 2 DIABETES MELLITUS WITH COMPLICATION, WITHOUT LONG-TERM CURRENT USE OF INSULIN (HCC): Primary | ICD-10-CM

## 2024-01-22 DIAGNOSIS — I10 HYPERTENSION, ESSENTIAL: ICD-10-CM

## 2024-01-22 DIAGNOSIS — E78.2 MIXED HYPERLIPIDEMIA: ICD-10-CM

## 2024-01-22 DIAGNOSIS — M72.2 PLANTAR FIBROMATOSIS: ICD-10-CM

## 2024-01-22 DIAGNOSIS — K57.92 DIVERTICULITIS: ICD-10-CM

## 2024-01-22 DIAGNOSIS — B37.2 YEAST DERMATITIS: ICD-10-CM

## 2024-01-22 DIAGNOSIS — K59.1 FUNCTIONAL DIARRHEA: ICD-10-CM

## 2024-01-22 LAB
ALBUMIN/GLOBULIN RATIO: 1.6 G/DL
ALBUMIN: 4.7 G/DL (ref 3.5–5)
ALP BLD-CCNC: 93 UNITS/L (ref 38–126)
ALT SERPL-CCNC: 19 UNITS/L (ref 4–35)
ANION GAP SERPL CALCULATED.3IONS-SCNC: 9 MMOL/L (ref 12–16)
AST SERPL-CCNC: 20 UNITS/L (ref 14–36)
BILIRUB SERPL-MCNC: 0.5 MG/DL (ref 0.2–1.3)
BUN BLDV-MCNC: 17 MG/DL (ref 7–17)
CALCIUM SERPL-MCNC: 10.2 MG/DL (ref 8.4–10.2)
CHLORIDE BLD-SCNC: 108 MMOL/L (ref 98–120)
CO2: 24 MMOL/L (ref 22–31)
CREAT SERPL-MCNC: 0.9 MG/DL (ref 0.5–1)
GFR CALCULATED: > 60
GLOBULIN: 3 G/DL
GLUCOSE: 194 MG/DL (ref 65–105)
HBA1C MFR BLD: 7.6 %
POTASSIUM SERPL-SCNC: 4.8 MMOL/L (ref 3.6–5)
SODIUM BLD-SCNC: 141 MMOL/L (ref 135–145)
TOTAL PROTEIN, SERUM: 7.7 G/DL (ref 6.3–8.2)

## 2024-01-22 PROCEDURE — 1090F PRES/ABSN URINE INCON ASSESS: CPT | Performed by: FAMILY MEDICINE

## 2024-01-22 PROCEDURE — 1123F ACP DISCUSS/DSCN MKR DOCD: CPT | Performed by: FAMILY MEDICINE

## 2024-01-22 PROCEDURE — 3078F DIAST BP <80 MM HG: CPT | Performed by: FAMILY MEDICINE

## 2024-01-22 PROCEDURE — G8400 PT W/DXA NO RESULTS DOC: HCPCS | Performed by: FAMILY MEDICINE

## 2024-01-22 PROCEDURE — G8484 FLU IMMUNIZE NO ADMIN: HCPCS | Performed by: FAMILY MEDICINE

## 2024-01-22 PROCEDURE — G8427 DOCREV CUR MEDS BY ELIG CLIN: HCPCS | Performed by: FAMILY MEDICINE

## 2024-01-22 PROCEDURE — 99214 OFFICE O/P EST MOD 30 MIN: CPT | Performed by: FAMILY MEDICINE

## 2024-01-22 PROCEDURE — 3074F SYST BP LT 130 MM HG: CPT | Performed by: FAMILY MEDICINE

## 2024-01-22 PROCEDURE — 99212 OFFICE O/P EST SF 10 MIN: CPT | Performed by: FAMILY MEDICINE

## 2024-01-22 PROCEDURE — G2211 COMPLEX E/M VISIT ADD ON: HCPCS | Performed by: FAMILY MEDICINE

## 2024-01-22 PROCEDURE — 3017F COLORECTAL CA SCREEN DOC REV: CPT | Performed by: FAMILY MEDICINE

## 2024-01-22 PROCEDURE — G8417 CALC BMI ABV UP PARAM F/U: HCPCS | Performed by: FAMILY MEDICINE

## 2024-01-22 PROCEDURE — 95251 CONT GLUC MNTR ANALYSIS I&R: CPT | Performed by: FAMILY MEDICINE

## 2024-01-22 PROCEDURE — 2022F DILAT RTA XM EVC RTNOPTHY: CPT | Performed by: FAMILY MEDICINE

## 2024-01-22 PROCEDURE — PBSHW POCT GLYCOSYLATED HEMOGLOBIN (HGB A1C): Performed by: FAMILY MEDICINE

## 2024-01-22 PROCEDURE — 83036 HEMOGLOBIN GLYCOSYLATED A1C: CPT | Performed by: FAMILY MEDICINE

## 2024-01-22 PROCEDURE — 1036F TOBACCO NON-USER: CPT | Performed by: FAMILY MEDICINE

## 2024-01-22 PROCEDURE — 3051F HG A1C>EQUAL 7.0%<8.0%: CPT | Performed by: FAMILY MEDICINE

## 2024-01-22 RX ORDER — ONDANSETRON 4 MG/1
4 TABLET, ORALLY DISINTEGRATING ORAL 3 TIMES DAILY PRN
Qty: 21 TABLET | Refills: 0 | Status: SHIPPED | OUTPATIENT
Start: 2024-01-22

## 2024-01-22 RX ORDER — INSULIN GLARGINE 300 U/ML
12 INJECTION, SOLUTION SUBCUTANEOUS DAILY
Qty: 1 ADJUSTABLE DOSE PRE-FILLED PEN SYRINGE | Refills: 0
Start: 2024-01-22

## 2024-01-22 RX ORDER — FLUCONAZOLE 150 MG/1
TABLET ORAL
Qty: 3 TABLET | Refills: 3 | Status: SHIPPED | OUTPATIENT
Start: 2024-01-22 | End: 2024-01-25

## 2024-01-22 ASSESSMENT — PATIENT HEALTH QUESTIONNAIRE - PHQ9
SUM OF ALL RESPONSES TO PHQ QUESTIONS 1-9: 0
2. FEELING DOWN, DEPRESSED OR HOPELESS: 0
SUM OF ALL RESPONSES TO PHQ QUESTIONS 1-9: 0
SUM OF ALL RESPONSES TO PHQ QUESTIONS 1-9: 0
1. LITTLE INTEREST OR PLEASURE IN DOING THINGS: 0
SUM OF ALL RESPONSES TO PHQ QUESTIONS 1-9: 0
SUM OF ALL RESPONSES TO PHQ9 QUESTIONS 1 & 2: 0

## 2024-01-22 NOTE — PROGRESS NOTES
CGM report downloaded and reviewed from the past 2 weeks scanned to media tab. Time in range 61%, 39% hyperglycemia, and hypoglycemia 0%.  Predicted A1c per CGM report 7.6% and average glucose 176 mg/dl.   Will increase the insulin to 12 units daily.

## 2024-01-22 NOTE — PROGRESS NOTES
[facility]  [address], [address]  [place] [address]  Dept: [phone]  Dept Fax:[phone]    Alexus Harris is a 68 y.o. female who presents today for her medical conditions/complaints as notedbelow.  Alexus Harris is c/o of Diabetes (3 month f/u), Vaginitis (Pt states that she has a yeast infection. Symptoms started about 9 days ago. Symptoms include itching. ), and Skin Problem (Pt states that she has a spot on the bottom of her left foot. Pt states that the more she is standing the more pain she is having. )        Assessment/Plan:     1. Type 2 diabetes mellitus with complication, without long-term current use of insulin (HCC)  -     POCT glycosylated hemoglobin (Hb A1C)  -     Microalbumin, Ur; Future  -     Comprehensive Metabolic Panel; Future  2. Hypertension, essential  3. Mixed hyperlipidemia  4. Plantar fibromatosis  5. Functional diarrhea  -     ondansetron (ZOFRAN-ODT) 4 MG disintegrating tablet; Take 1 tablet by mouth 3 times daily as needed for Nausea or Vomiting, Disp-21 tablet, R-0Normal  6. Diverticulitis  -     ondansetron (ZOFRAN-ODT) 4 MG disintegrating tablet; Take 1 tablet by mouth 3 times daily as needed for Nausea or Vomiting, Disp-21 tablet, R-0Normal  7. Yeast dermatitis  -     fluconazole (DIFLUCAN) 150 MG tablet; One po x 1.  May repeat in 3 days prn., Disp-3 tablet, R-3Normal    Type 2 diabetes- A1c today is 7.6.  This is significant improvement from 9.2.  Did review patient's CGM data, still has room to increase insulin.  Will increase insulin to 12 units daily.  Continue Farxiga 10 mg.  Continue metformin 500 mg daily.  Prior intolerance to GLP-1's.  CMP ordered.  Follow-up in 6 months with repeat A1c.  Continue moderate intensity statin.  Recheck lipid panel at next office visit.    Yeast infection-new prescription for Diflucan given to patient today.  Patient encouraged to call the office if not resolving and we will perform

## 2024-02-07 ENCOUNTER — OFFICE VISIT (OUTPATIENT)
Dept: FAMILY MEDICINE CLINIC | Age: 69
End: 2024-02-07
Payer: MEDICARE

## 2024-02-07 VITALS
BODY MASS INDEX: 33.65 KG/M2 | HEART RATE: 99 BPM | SYSTOLIC BLOOD PRESSURE: 122 MMHG | OXYGEN SATURATION: 96 % | WEIGHT: 184 LBS | DIASTOLIC BLOOD PRESSURE: 68 MMHG

## 2024-02-07 DIAGNOSIS — R10.84 ABDOMINAL PAIN, ACUTE, GENERALIZED: Primary | ICD-10-CM

## 2024-02-07 DIAGNOSIS — R11.2 NAUSEA AND VOMITING, UNSPECIFIED VOMITING TYPE: ICD-10-CM

## 2024-02-07 DIAGNOSIS — Z79.4 DIABETES MELLITUS TYPE 2, INSULIN DEPENDENT (HCC): ICD-10-CM

## 2024-02-07 DIAGNOSIS — E78.2 MIXED HYPERLIPIDEMIA: ICD-10-CM

## 2024-02-07 DIAGNOSIS — E11.9 DIABETES MELLITUS TYPE 2, INSULIN DEPENDENT (HCC): ICD-10-CM

## 2024-02-07 DIAGNOSIS — I10 HYPERTENSION, ESSENTIAL: ICD-10-CM

## 2024-02-07 DIAGNOSIS — E66.01 MORBIDLY OBESE (HCC): ICD-10-CM

## 2024-02-07 LAB
ALBUMIN/GLOBULIN RATIO: 1.4 G/DL
ALBUMIN: 4.3 G/DL (ref 3.5–5)
ALP BLD-CCNC: 158 UNITS/L (ref 38–126)
ALT SERPL-CCNC: 96 UNITS/L (ref 4–35)
ANION GAP SERPL CALCULATED.3IONS-SCNC: 12.8 MMOL/L (ref 3–11)
AST SERPL-CCNC: 57 UNITS/L (ref 14–36)
BASOPHILS %: 0.6 (ref 0–3)
BASOPHILS ABSOLUTE: 0.07 (ref 0–0.3)
BILIRUB SERPL-MCNC: 0.5 MG/DL (ref 0.2–1.3)
BUN BLDV-MCNC: 12 MG/DL (ref 7–17)
CALCIUM SERPL-MCNC: 9.7 MG/DL (ref 8.4–10.2)
CHLORIDE BLD-SCNC: 113 MMOL/L (ref 98–120)
CO2: 18 MMOL/L (ref 22–31)
CREAT SERPL-MCNC: 0.9 MG/DL (ref 0.5–1)
CREATININE CLEARANCE: 46.66
CREATININE, RANDOM URINE: 53.8 MG/DL (ref 20–370)
EOSINOPHILS %: 3 (ref 0–10)
EOSINOPHILS ABSOLUTE: 0.37 (ref 0–1.1)
GFR CALCULATED: > 60
GLOBULIN: 3.1 G/DL
GLUCOSE: 163 MG/DL (ref 65–105)
HCT VFR BLD CALC: 45 % (ref 37–47)
HEMOGLOBIN: 14.2 (ref 12–16)
LYMPHOCYTE %: 12.97 (ref 20–51.1)
LYMPHOCYTES ABSOLUTE: 1.62 (ref 1–5.5)
MCH RBC QN AUTO: 26.8 PG (ref 28.5–32.5)
MCHC RBC AUTO-ENTMCNC: 31.6 G/DL (ref 32–37)
MCV RBC AUTO: 84.7 FL (ref 80–94)
MICROALBUMIN UR-MCNC: < 0.6 MG/DL (ref 0–1.7)
MONOCYTES %: 10.69 (ref 1.7–9.3)
MONOCYTES ABSOLUTE: 1.33 (ref 0.1–1)
NEUTROPHILS %: 72.74 (ref 42.2–75.2)
NEUTROPHILS ABSOLUTE: 9.08 (ref 2–8.1)
PDW BLD-RTO: 12.6 % (ref 8.5–15.5)
PLATELET # BLD: 355.3 THOU/MM3 (ref 130–400)
POTASSIUM SERPL-SCNC: 3.8 MMOL/L (ref 3.6–5)
RBC: 5.31 M/UL (ref 4.2–5.4)
SODIUM BLD-SCNC: 140 MMOL/L (ref 135–145)
TOTAL PROTEIN, SERUM: 7.3 G/DL (ref 6.3–8.2)
WBC: 12.5 THOU/ML3 (ref 4.8–10.8)

## 2024-02-07 PROCEDURE — G8484 FLU IMMUNIZE NO ADMIN: HCPCS | Performed by: FAMILY MEDICINE

## 2024-02-07 PROCEDURE — 3078F DIAST BP <80 MM HG: CPT | Performed by: FAMILY MEDICINE

## 2024-02-07 PROCEDURE — 1036F TOBACCO NON-USER: CPT | Performed by: FAMILY MEDICINE

## 2024-02-07 PROCEDURE — 1090F PRES/ABSN URINE INCON ASSESS: CPT | Performed by: FAMILY MEDICINE

## 2024-02-07 PROCEDURE — 1123F ACP DISCUSS/DSCN MKR DOCD: CPT | Performed by: FAMILY MEDICINE

## 2024-02-07 PROCEDURE — 99212 OFFICE O/P EST SF 10 MIN: CPT | Performed by: FAMILY MEDICINE

## 2024-02-07 PROCEDURE — 3074F SYST BP LT 130 MM HG: CPT | Performed by: FAMILY MEDICINE

## 2024-02-07 PROCEDURE — 3051F HG A1C>EQUAL 7.0%<8.0%: CPT | Performed by: FAMILY MEDICINE

## 2024-02-07 PROCEDURE — 2022F DILAT RTA XM EVC RTNOPTHY: CPT | Performed by: FAMILY MEDICINE

## 2024-02-07 PROCEDURE — G8427 DOCREV CUR MEDS BY ELIG CLIN: HCPCS | Performed by: FAMILY MEDICINE

## 2024-02-07 PROCEDURE — 3017F COLORECTAL CA SCREEN DOC REV: CPT | Performed by: FAMILY MEDICINE

## 2024-02-07 PROCEDURE — 99215 OFFICE O/P EST HI 40 MIN: CPT | Performed by: FAMILY MEDICINE

## 2024-02-07 PROCEDURE — G8417 CALC BMI ABV UP PARAM F/U: HCPCS | Performed by: FAMILY MEDICINE

## 2024-02-07 PROCEDURE — G8400 PT W/DXA NO RESULTS DOC: HCPCS | Performed by: FAMILY MEDICINE

## 2024-02-07 NOTE — PROGRESS NOTES
Dispense Refill    ondansetron (ZOFRAN-ODT) 4 MG disintegrating tablet Take 1 tablet by mouth 3 times daily as needed for Nausea or Vomiting 21 tablet 0    Insulin Glargine, 2 Unit Dial, (TOUJEO ADIEL SOLOSTAR) 300 UNIT/ML SOPN Inject 12 Units into the skin daily 1 Adjustable Dose Pre-filled Pen Syringe 0    amLODIPine (NORVASC) 10 MG tablet Take 1 tablet by mouth once daily 90 tablet 3    omeprazole (PRILOSEC) 20 MG delayed release capsule Take 1 capsule by mouth once daily 90 capsule 1    FARXIGA 10 MG tablet Take 1 tablet by mouth every morning 90 tablet 3    metoprolol succinate (TOPROL XL) 25 MG extended release tablet Take 1 tablet by mouth daily 1/2 tab BID 90 tablet 3    metFORMIN (GLUCOPHAGE) 500 MG tablet Take 1 tablet by mouth in the morning, at noon, and at bedtime 270 tablet 3    rosuvastatin (CRESTOR) 10 MG tablet Take 1 tablet by mouth nightly 90 tablet 3    cholestyramine (QUESTRAN) 4 g packet Take 1 packet by mouth 2 times daily 90 packet 3    Insulin Pen Needle (DANGELO PEN NEEDLES) 31G X 8 MM MISC 1 each by Does not apply route daily 100 each 3    Continuous Blood Gluc Sensor (FREESTYLE JUAN ALBERTO 2 SENSOR) MISC 1 each by Does not apply route 4 times daily 1 each 11    Lancets Micro Thin 33G MISC 1 each by Does not apply route daily And prn. Insurance preferred brand 100 each 3    blood glucose monitor strips 1 strip by Other route daily Test 1 times a day & as needed for symptoms of irregular blood glucose. Dispense sufficient amount for indicated testing frequency plus additional to accommodate PRN testing needs. Insurance preferred brand 50 strip 5    blood glucose monitor kit and supplies 1 kit by Other route 2 times daily DX:type 2 diabetes May substitute insurance preferred brand 1 kit 5     No current facility-administered medications for this visit.     Allergies   Allergen Reactions    Sulfa Antibiotics Hives    Metronidazole Nausea And Vomiting       Health Maintenance   Topic Date Due

## 2024-02-08 LAB
ANION GAP SERPL CALCULATED.3IONS-SCNC: 11 MMOL/L (ref 3–11)
BACTERIA, URINE: NORMAL /HPF
BASOPHILS %: 0.95 (ref 0–3)
BASOPHILS ABSOLUTE: 0.08 (ref 0–0.3)
BILIRUBIN URINE: NEGATIVE
BLOOD, URINE: NEGATIVE
BUN BLDV-MCNC: 11 MG/DL (ref 7–17)
CALCIUM SERPL-MCNC: 8.7 MG/DL (ref 8.4–10.2)
CASTS UA: NORMAL /LPF
CHLORIDE BLD-SCNC: 118 MMOL/L (ref 98–120)
CLARITY: CLEAR
CO2: 15 MMOL/L (ref 22–31)
COLOR, URINE: YELLOW
CREAT SERPL-MCNC: 0.6 MG/DL (ref 0.5–1)
CREATININE CLEARANCE: 69.99
CRYSTALS, UA: NORMAL
EOSINOPHILS %: 4.09 (ref 0–10)
EOSINOPHILS ABSOLUTE: 0.34 (ref 0–1.1)
GFR CALCULATED: > 60
GLUCOSE URINE: NORMAL MG/DL
GLUCOSE: 113 MG/DL (ref 65–105)
HCT VFR BLD CALC: 40.1 % (ref 37–47)
HEMOGLOBIN: 12.6 (ref 12–16)
KETONES, URINE: NORMAL MG/DL
LEUKOCYTE ESTERASE, URINE: NEGATIVE
LYMPHOCYTE %: 20.78 (ref 20–51.1)
LYMPHOCYTES ABSOLUTE: 1.72 (ref 1–5.5)
MCH RBC QN AUTO: 26.6 PG (ref 28.5–32.5)
MCHC RBC AUTO-ENTMCNC: 31.5 G/DL (ref 32–37)
MCV RBC AUTO: 84.5 FL (ref 80–94)
MONOCYTES %: 17.68 (ref 1.7–9.3)
MONOCYTES ABSOLUTE: 1.46 (ref 0.1–1)
NEUTROPHILS %: 56.49 (ref 42.2–75.2)
NEUTROPHILS ABSOLUTE: 4.66 (ref 2–8.1)
NITRITE, URINE: NEGATIVE
PDW BLD-RTO: 12.9 % (ref 8.5–15.5)
PH UA: 6 (ref 5–8.5)
PLATELET # BLD: 293.9 THOU/MM3 (ref 130–400)
POTASSIUM SERPL-SCNC: 4 MMOL/L (ref 3.6–5)
PROTEIN UA: NORMAL MG/DL
RBC URINE: NORMAL /HPF (ref 0–2)
RBC: 4.74 M/UL (ref 4.2–5.4)
SODIUM BLD-SCNC: 140 MMOL/L (ref 135–145)
SPECIFIC GRAVITY, URINE: 1 MG/DL (ref 1–1.03)
SQUAMOUS EPITHELIAL: NORMAL /HPF
UROBILINOGEN, URINE: 0.2 MG/DL (ref 0.2–1)
WBC URINE: NORMAL /HPF (ref 0–4)
WBC: 8.3 THOU/ML3 (ref 4.8–10.8)

## 2024-02-09 LAB
ALBUMIN/GLOBULIN RATIO: 1.19 G/DL
ALBUMIN: 3.2 G/DL (ref 3.5–5)
ALP BLD-CCNC: 109 UNITS/L (ref 38–126)
ALT SERPL-CCNC: 49 UNITS/L (ref 4–35)
ANION GAP SERPL CALCULATED.3IONS-SCNC: 12 MMOL/L (ref 3–11)
AST SERPL-CCNC: 24 UNITS/L (ref 14–36)
BILIRUB SERPL-MCNC: 0.3 MG/DL (ref 0.2–1.3)
BUN BLDV-MCNC: 5 MG/DL (ref 7–17)
CALCIUM SERPL-MCNC: 8.7 MG/DL (ref 8.4–10.2)
CHLORIDE BLD-SCNC: 118 MMOL/L (ref 98–120)
CO2: 15 MMOL/L (ref 22–31)
CREAT SERPL-MCNC: 0.5 MG/DL (ref 0.5–1)
CREATININE CLEARANCE: 83.99
GFR CALCULATED: > 60
GLOBULIN: 2.7 G/DL
GLUCOSE: 125 MG/DL (ref 65–105)
POTASSIUM SERPL-SCNC: 4 MMOL/L (ref 3.6–5)
SODIUM BLD-SCNC: 141 MMOL/L (ref 135–145)
TOTAL PROTEIN, SERUM: 5.9 G/DL (ref 6.3–8.2)

## 2024-02-12 ENCOUNTER — TELEPHONE (OUTPATIENT)
Dept: FAMILY MEDICINE CLINIC | Age: 69
End: 2024-02-12

## 2024-02-12 NOTE — TELEPHONE ENCOUNTER
Care Transitions Initial Follow Up Call    Outreach made within 2 business days of discharge: Yes    Patient: Alexus Harris Patient : 1955   MRN: [id number]  Reason for Admission: SBO  Discharge Date:    2/10/24     Spoke with: patient    Discharge department/facility: [facility] Interactive Patient Contact:  Was patient able to fill all prescriptions: Yes  Was patient instructed to bring all medications to the follow-up visit: Yes  Is patient taking all medications as directed in the discharge summary? Yes  Does patient understand their discharge instructions: Yes  Does patient have questions or concerns that need addressed prior to 7-14 day follow up office visit: no    Scheduled appointment with PCP within 7-14 days    Follow Up  Future Appointments   Date Time Provider Department Center   2/15/2024  2:20 PM Paige Smith MD [facility]   2024  1:40 PM Paige Smith MD [facility] LPN

## 2024-02-15 ENCOUNTER — OFFICE VISIT (OUTPATIENT)
Dept: FAMILY MEDICINE CLINIC | Age: 69
End: 2024-02-15

## 2024-02-15 VITALS
OXYGEN SATURATION: 98 % | DIASTOLIC BLOOD PRESSURE: 80 MMHG | HEART RATE: 83 BPM | BODY MASS INDEX: 32.92 KG/M2 | SYSTOLIC BLOOD PRESSURE: 126 MMHG | WEIGHT: 180 LBS

## 2024-02-15 DIAGNOSIS — Z09 HOSPITAL DISCHARGE FOLLOW-UP: Primary | ICD-10-CM

## 2024-02-15 DIAGNOSIS — Z79.4 DIABETES MELLITUS TYPE 2, INSULIN DEPENDENT (HCC): ICD-10-CM

## 2024-02-15 DIAGNOSIS — I10 HYPERTENSION, ESSENTIAL: ICD-10-CM

## 2024-02-15 DIAGNOSIS — K57.30 DIVERTICULA OF COLON: ICD-10-CM

## 2024-02-15 DIAGNOSIS — K56.609 SBO (SMALL BOWEL OBSTRUCTION) (HCC): ICD-10-CM

## 2024-02-15 DIAGNOSIS — E11.9 DIABETES MELLITUS TYPE 2, INSULIN DEPENDENT (HCC): ICD-10-CM

## 2024-02-15 NOTE — PROGRESS NOTES
packet 3        Medications patient taking as of now reconciled against medications ordered at time of hospital discharge: Yes    Review of Systems    Objective:    /80 (Site: Left Upper Arm, Position: Sitting, Cuff Size: Medium Adult)   Pulse 83   Wt 81.6 kg (180 lb)   SpO2 98%   BMI 32.92 kg/m²   Physical Exam  Vitals and nursing note reviewed.   Constitutional:       Appearance: Normal appearance. She is well-developed.   HENT:      Head: Normocephalic and atraumatic.      Right Ear: External ear normal.      Left Ear: External ear normal.   Eyes:      Extraocular Movements: Extraocular movements intact.      Conjunctiva/sclera: Conjunctivae normal.   Neck:      Thyroid: No thyromegaly.      Vascular: No JVD.   Cardiovascular:      Rate and Rhythm: Normal rate and regular rhythm.      Pulses: Normal pulses.   Pulmonary:      Effort: Pulmonary effort is normal. No respiratory distress.      Breath sounds: Normal breath sounds.   Abdominal:      General: Bowel sounds are normal. There is no distension.      Palpations: Abdomen is soft. There is no mass.      Tenderness: There is no right CVA tenderness, left CVA tenderness, guarding or rebound.      Comments: Mild RLQ tenderness, much improved from hospital   Musculoskeletal:      Cervical back: Normal range of motion and neck supple.      Right lower leg: No edema.      Left lower leg: No edema.   Lymphadenopathy:      Cervical: No cervical adenopathy.   Skin:     General: Skin is warm.      Capillary Refill: Capillary refill takes less than 2 seconds.   Neurological:      General: No focal deficit present.      Mental Status: She is alert and oriented to person, place, and time.   Psychiatric:         Mood and Affect: Mood normal.         Behavior: Behavior normal.         Thought Content: Thought content normal.         Judgment: Judgment normal.       An electronic signature was used to authenticate this note.  --Paige Smith MD

## 2024-03-27 RX ORDER — OMEPRAZOLE 20 MG/1
20 CAPSULE, DELAYED RELEASE ORAL DAILY
Qty: 90 CAPSULE | Refills: 1 | Status: SHIPPED | OUTPATIENT
Start: 2024-03-27

## 2024-03-27 NOTE — TELEPHONE ENCOUNTER
Alexus Harris is requesting a refill on the following medication(s):  Requested Prescriptions     Pending Prescriptions Disp Refills    omeprazole (PRILOSEC) 20 MG delayed release capsule [Pharmacy Med Name: Omeprazole 20 MG Oral Capsule Delayed Release] 90 capsule 1     Sig: Take 1 capsule by mouth once daily       Last Visit Date (If Applicable):  2/15/2024    Next Visit Date:    7/22/2024

## 2024-05-02 ENCOUNTER — INITIAL CONSULT (OUTPATIENT)
Dept: SURGERY | Age: 69
End: 2024-05-02
Payer: MEDICARE

## 2024-05-02 VITALS
HEIGHT: 62 IN | TEMPERATURE: 98.9 F | WEIGHT: 181 LBS | BODY MASS INDEX: 33.31 KG/M2 | DIASTOLIC BLOOD PRESSURE: 74 MMHG | SYSTOLIC BLOOD PRESSURE: 136 MMHG | OXYGEN SATURATION: 97 % | HEART RATE: 91 BPM

## 2024-05-02 DIAGNOSIS — K56.609 SBO (SMALL BOWEL OBSTRUCTION) (HCC): Primary | ICD-10-CM

## 2024-05-02 PROCEDURE — G8427 DOCREV CUR MEDS BY ELIG CLIN: HCPCS | Performed by: SURGERY

## 2024-05-02 PROCEDURE — 1123F ACP DISCUSS/DSCN MKR DOCD: CPT | Performed by: SURGERY

## 2024-05-02 PROCEDURE — 3075F SYST BP GE 130 - 139MM HG: CPT | Performed by: SURGERY

## 2024-05-02 PROCEDURE — 3078F DIAST BP <80 MM HG: CPT | Performed by: SURGERY

## 2024-05-02 PROCEDURE — G8417 CALC BMI ABV UP PARAM F/U: HCPCS | Performed by: SURGERY

## 2024-05-02 PROCEDURE — 3017F COLORECTAL CA SCREEN DOC REV: CPT | Performed by: SURGERY

## 2024-05-02 PROCEDURE — G8400 PT W/DXA NO RESULTS DOC: HCPCS | Performed by: SURGERY

## 2024-05-02 PROCEDURE — 1090F PRES/ABSN URINE INCON ASSESS: CPT | Performed by: SURGERY

## 2024-05-02 PROCEDURE — 99214 OFFICE O/P EST MOD 30 MIN: CPT | Performed by: SURGERY

## 2024-05-02 PROCEDURE — 1036F TOBACCO NON-USER: CPT | Performed by: SURGERY

## 2024-05-02 NOTE — PROGRESS NOTES
Patient presents today for evaluation of diverticulosis and SBO referred by PCP Dr. Smith. Patient was hospitalized in February at [facility] from 2/7/24 - 2/10/24 for SBO. Patient reports she had severe abdominal pain the weekend prior and made appointment with PCP on 2/7/24 and then was admitted and CT ABD Pelvis was done 2/8/24 with following findings;     IMPRESSION:    1.  Diffuse hepatic steatosis with mild hepatomegaly.    2.  Small bowel obstruction in the mid abdomen.    3.  Bilateral renal cortical and parapelvic cysts.    4.  Medium-sized hiatal hernia.         H/O sigmoid colectomy back in 10/24/2001 d/t diverticulitis.     Abd pain: yes, reports right sided upper & lower abdominal pain all the time   Anemia: no  Bloating:yes, occasionally   Diarrhea: yes, reports every BM is loose and watery, states she has BM when she wakes up and has 2-3 t/o the day, ongoing issue for several years   Constipation: no  Melena: no  Hematochezia: yes, sometimes   Rectal Bleeding: yes, with wiping   Rectal/Anal Pain: yes, sometimes   Pruritus: yes, sometimes   Family history colon Cancer: no  Previous colon cancer: no  Previous Colon Polyp: no  Change in bowels: no  Decrease caliber of stool: no  Change in color of stool: no    Previous work up date: Colonoscopy 10/18/2019 @ [facility] Dr. Olivares, findings include; diverticulosis,  internal & external hemorrhoids.

## 2024-05-02 NOTE — PROGRESS NOTES
Alexus Harris is a 69 y.o. female      CC:    Recent hospitalization for SBO    HISTORY OF PRESENT ILLNESS:    Pt is 68 yo F who was in the hosp at [facility] for SBO.  Here for follow up patient has had a history of diverticulitis and had surgery in 2001.  She has had a few flareups since that time and been hospitalized for antibiotics but she gets over it very quickly and has not had further surgery.  Her last colonoscopy was 2019 it was normal and she was scheduled for another 10-year follow-up.    This time when she was in the hospital the CT showed no inflammation around the colon all appeared to be a small bowel obstruction.  She has had a couple of those she thought.  She also noted that Dr. Mcmillan that she had some adhesions when he did the gallbladder surgery.    Patient presents today for evaluation of diverticulosis and SBO referred by PCP Dr. Smith. Patient was hospitalized in February at [facility] from 2/7/24 - 2/10/24 for SBO. Patient reports she had severe abdominal pain the weekend prior and made appointment with PCP on 2/7/24 and then was admitted and CT ABD Pelvis was done 2/8/24 with following findings;      IMPRESSION:    1.  Diffuse hepatic steatosis with mild hepatomegaly.    2.  Small bowel obstruction in the mid abdomen.    3.  Bilateral renal cortical and parapelvic cysts.    4.  Medium-sized hiatal hernia.         H/O sigmoid colectomy back in 10/24/2001 d/t diverticulitis.      Abd pain: yes, reports right sided upper & lower abdominal pain all the time   Anemia: no  Bloating:yes, occasionally   Diarrhea: yes, reports every BM is loose and watery, states she has BM when she wakes up and has 2-3 t/o the day, ongoing issue for several years   Constipation: no  Melena: no  Hematochezia: yes, sometimes   Rectal Bleeding: yes, with wiping   Rectal/Anal Pain: yes, sometimes   Pruritus: yes, sometimes   Family history colon Cancer: no  Previous colon cancer: no  Previous Colon Polyp: no  Change in

## 2024-05-22 ENCOUNTER — TELEPHONE (OUTPATIENT)
Dept: FAMILY MEDICINE CLINIC | Age: 69
End: 2024-05-22

## 2024-05-22 RX ORDER — BLOOD-GLUCOSE SENSOR
1 EACH MISCELLANEOUS
Qty: 8 EACH | Refills: 3 | Status: SHIPPED | OUTPATIENT
Start: 2024-05-22

## 2024-05-22 NOTE — TELEPHONE ENCOUNTER
Alexus Harris is requesting a refill on the following medication(s):  Requested Prescriptions     Pending Prescriptions Disp Refills    Continuous Glucose Sensor (FREESTYLE JUAN ABLERTO 3 SENSOR) MISC 8 each 3     Si each by Does not apply route every 14 days       Last Visit Date (If Applicable):  2/15/2024    Next Visit Date:    2024      Rx needs printed and faxed to

## 2024-05-22 NOTE — TELEPHONE ENCOUNTER
Berenice called and said she is out of Free Style Suzette 3 sensors, she is unsure where they are sent from.

## 2024-06-18 DIAGNOSIS — E78.2 MIXED HYPERLIPIDEMIA: ICD-10-CM

## 2024-06-18 DIAGNOSIS — E11.8 TYPE 2 DIABETES MELLITUS WITH COMPLICATION, WITHOUT LONG-TERM CURRENT USE OF INSULIN (HCC): ICD-10-CM

## 2024-06-18 RX ORDER — ROSUVASTATIN CALCIUM 10 MG/1
10 TABLET, COATED ORAL NIGHTLY
Qty: 90 TABLET | Refills: 1 | Status: SHIPPED | OUTPATIENT
Start: 2024-06-18

## 2024-06-18 NOTE — TELEPHONE ENCOUNTER
Alexus Harris is requesting a refill on the following medication(s):  Requested Prescriptions     Pending Prescriptions Disp Refills    rosuvastatin (CRESTOR) 10 MG tablet [Pharmacy Med Name: Rosuvastatin Calcium 10 MG Oral Tablet] 90 tablet 1     Sig: Take 1 tablet by mouth nightly    metFORMIN (GLUCOPHAGE) 500 MG tablet [Pharmacy Med Name: metFORMIN HCl 500 MG Oral Tablet] 270 tablet 0     Sig: TAKE 1 TABLET BY MOUTH IN THE MORNING AND 1 AT NOON AND 1 AT BEDTIME       Last Visit Date (If Applicable):  2/15/2024    Next Visit Date:    7/22/2024

## 2024-07-07 DIAGNOSIS — E11.8 TYPE 2 DIABETES MELLITUS WITH COMPLICATION, WITHOUT LONG-TERM CURRENT USE OF INSULIN (HCC): ICD-10-CM

## 2024-07-07 DIAGNOSIS — I10 HYPERTENSION, ESSENTIAL: ICD-10-CM

## 2024-07-08 NOTE — TELEPHONE ENCOUNTER
Alexus Harris is requesting a refill on the following medication(s):  Requested Prescriptions     Pending Prescriptions Disp Refills    metoprolol succinate (TOPROL XL) 25 MG extended release tablet [Pharmacy Med Name: Metoprolol Succinate ER 25 MG Oral Tablet Extended Release 24 Hour] 90 tablet 3     Sig: Take 1/2 (one-half) tablet by mouth twice daily       Last Visit Date (If Applicable):  2/15/2024    Next Visit Date:    7/22/2024

## 2024-07-09 RX ORDER — METOPROLOL SUCCINATE 25 MG/1
TABLET, EXTENDED RELEASE ORAL
Qty: 90 TABLET | Refills: 3 | Status: SHIPPED | OUTPATIENT
Start: 2024-07-09

## 2024-07-22 ENCOUNTER — OFFICE VISIT (OUTPATIENT)
Dept: FAMILY MEDICINE CLINIC | Age: 69
End: 2024-07-22
Payer: MEDICARE

## 2024-07-22 VITALS
HEART RATE: 79 BPM | SYSTOLIC BLOOD PRESSURE: 136 MMHG | WEIGHT: 180 LBS | BODY MASS INDEX: 32.92 KG/M2 | DIASTOLIC BLOOD PRESSURE: 84 MMHG | OXYGEN SATURATION: 97 %

## 2024-07-22 DIAGNOSIS — G89.29 ABDOMINAL PAIN, CHRONIC, RIGHT LOWER QUADRANT: ICD-10-CM

## 2024-07-22 DIAGNOSIS — I10 HYPERTENSION, ESSENTIAL: ICD-10-CM

## 2024-07-22 DIAGNOSIS — R10.31 ABDOMINAL PAIN, CHRONIC, RIGHT LOWER QUADRANT: ICD-10-CM

## 2024-07-22 DIAGNOSIS — Z51.81 MEDICATION MONITORING ENCOUNTER: ICD-10-CM

## 2024-07-22 DIAGNOSIS — E78.2 MIXED HYPERLIPIDEMIA: ICD-10-CM

## 2024-07-22 DIAGNOSIS — Z12.31 VISIT FOR SCREENING MAMMOGRAM: ICD-10-CM

## 2024-07-22 DIAGNOSIS — E11.8 TYPE 2 DIABETES MELLITUS WITH COMPLICATION, WITHOUT LONG-TERM CURRENT USE OF INSULIN (HCC): Primary | ICD-10-CM

## 2024-07-22 LAB — HBA1C MFR BLD: 6.7 %

## 2024-07-22 PROCEDURE — 99212 OFFICE O/P EST SF 10 MIN: CPT | Performed by: FAMILY MEDICINE

## 2024-07-22 PROCEDURE — 83036 HEMOGLOBIN GLYCOSYLATED A1C: CPT | Performed by: FAMILY MEDICINE

## 2024-07-22 NOTE — PROGRESS NOTES
tablet by mouth once daily 90 tablet 3    FARXIGA 10 MG tablet Take 1 tablet by mouth every morning 90 tablet 3    Continuous Glucose Sensor (FREESTYLE JUAN ALBERTO 3 SENSOR) MISC 1 each by Does not apply route every 14 days 8 each 3    Insulin Pen Needle (DANGELO PEN NEEDLES) 31G X 8 MM MISC 1 each by Does not apply route daily 100 each 3    Continuous Blood Gluc Sensor (FREESTYLE JUAN ALBERTO 2 SENSOR) MISC 1 each by Does not apply route 4 times daily 1 each 11    cholestyramine (QUESTRAN) 4 g packet Take 1 packet by mouth 2 times daily (Patient not taking: Reported on 5/2/2024) 90 packet 3    Lancets Micro Thin 33G MISC 1 each by Does not apply route daily And prn. Insurance preferred brand 100 each 3    blood glucose monitor strips 1 strip by Other route daily Test 1 times a day & as needed for symptoms of irregular blood glucose. Dispense sufficient amount for indicated testing frequency plus additional to accommodate PRN testing needs. Insurance preferred brand 50 strip 5    blood glucose monitor kit and supplies 1 kit by Other route 2 times daily DX:type 2 diabetes May substitute insurance preferred brand 1 kit 5     No current facility-administered medications for this visit.     Allergies   Allergen Reactions    Sulfa Antibiotics Hives    Metronidazole Nausea And Vomiting       Health Maintenance   Topic Date Due    DTaP/Tdap/Td vaccine (1 - Tdap) Never done    Shingles vaccine (1 of 2) Never done    Respiratory Syncytial Virus (RSV) Pregnant or age 60 yrs+ (1 - 1-dose 60+ series) Never done    Diabetic retinal exam  09/03/2021    Diabetic Alb to Cr ratio (uACR) test  11/15/2022    COVID-19 Vaccine (5 - 2023-24 season) 09/01/2023    Lipids  05/04/2024    Breast cancer screen  05/14/2024    Flu vaccine (1) 08/01/2024    Diabetic foot exam  10/19/2024    Annual Wellness Visit (Medicare)  10/19/2024    Depression Screen  01/22/2025    GFR test (Diabetes, CKD 3-4, OR last GFR 15-59)  02/09/2025    A1C test (Diabetic or

## 2024-08-01 RX ORDER — INSULIN GLARGINE 300 U/ML
12 INJECTION, SOLUTION SUBCUTANEOUS DAILY
Qty: 1 ADJUSTABLE DOSE PRE-FILLED PEN SYRINGE | Refills: 0 | Status: SHIPPED | OUTPATIENT
Start: 2024-08-01

## 2024-08-01 NOTE — TELEPHONE ENCOUNTER
Alexus Harris is requesting a refill on the following medication(s):  Requested Prescriptions     Pending Prescriptions Disp Refills    Insulin Glargine, 2 Unit Dial, (TOUJEO MAX SOLOSTAR) 300 UNIT/ML SOPN 1 Adjustable Dose Pre-filled Pen Syringe 0     Sig: Inject 12 Units into the skin daily       Last Visit Date (If Applicable):  7/22/2024    Next Visit Date:    10/24/2024

## 2024-08-07 ENCOUNTER — TELEPHONE (OUTPATIENT)
Dept: SURGERY | Age: 69
End: 2024-08-07

## 2024-08-07 ENCOUNTER — OFFICE VISIT (OUTPATIENT)
Dept: SURGERY | Age: 69
End: 2024-08-07
Payer: MEDICARE

## 2024-08-07 VITALS
DIASTOLIC BLOOD PRESSURE: 78 MMHG | RESPIRATION RATE: 16 BRPM | OXYGEN SATURATION: 96 % | WEIGHT: 174.8 LBS | HEIGHT: 62 IN | TEMPERATURE: 98.7 F | HEART RATE: 82 BPM | SYSTOLIC BLOOD PRESSURE: 120 MMHG | BODY MASS INDEX: 32.17 KG/M2

## 2024-08-07 DIAGNOSIS — K56.609 SBO (SMALL BOWEL OBSTRUCTION) (HCC): Primary | ICD-10-CM

## 2024-08-07 DIAGNOSIS — Z01.818 PRE-OP TESTING: Primary | ICD-10-CM

## 2024-08-07 PROCEDURE — G8428 CUR MEDS NOT DOCUMENT: HCPCS | Performed by: SURGERY

## 2024-08-07 PROCEDURE — 1036F TOBACCO NON-USER: CPT | Performed by: SURGERY

## 2024-08-07 PROCEDURE — 3078F DIAST BP <80 MM HG: CPT | Performed by: SURGERY

## 2024-08-07 PROCEDURE — 1090F PRES/ABSN URINE INCON ASSESS: CPT | Performed by: SURGERY

## 2024-08-07 PROCEDURE — G8400 PT W/DXA NO RESULTS DOC: HCPCS | Performed by: SURGERY

## 2024-08-07 PROCEDURE — 1123F ACP DISCUSS/DSCN MKR DOCD: CPT | Performed by: SURGERY

## 2024-08-07 PROCEDURE — 3017F COLORECTAL CA SCREEN DOC REV: CPT | Performed by: SURGERY

## 2024-08-07 PROCEDURE — 99215 OFFICE O/P EST HI 40 MIN: CPT | Performed by: SURGERY

## 2024-08-07 PROCEDURE — G8417 CALC BMI ABV UP PARAM F/U: HCPCS | Performed by: SURGERY

## 2024-08-07 PROCEDURE — 99214 OFFICE O/P EST MOD 30 MIN: CPT | Performed by: SURGERY

## 2024-08-07 PROCEDURE — 3074F SYST BP LT 130 MM HG: CPT | Performed by: SURGERY

## 2024-08-07 NOTE — PROGRESS NOTES
Abd pain: yes, since May  Anemia: no  Bloating:yes  Diarrhea: yes, Daily  Constipation: no  Melena: no  Hematochezia:no  Rectal Bleeding:yes, only when her hemorrhoids are flared up  Rectal/Anal Pain:no  Pruritus: yes, sometimes  Family history colon Cancer: no  Previous colon cancer: no  Previous Colon Polyp: yes, with last colonoscopy  Change in bowels: no  Decrease caliber of stool: no  Change in color of stool: no    Previous work up date: Colonoscopy 10/18/19  with Clinton Olivares showed diverticulitis and internal and external hemorrhoids. Plan increase fiber and repeat colonoscopy in 10 years

## 2024-08-07 NOTE — PROGRESS NOTES
Alexus Harris is a 69 y.o. female      CC:    Abdominal pain bloating and nausea    HISTORY OF PRESENT ILLNESS:    Patient is a 69-year-old female who we have been seeing this year for abdominal pain specifically the right side with bloating nausea.  The pain is fairly constant on a daily basis and is getting worse.  We saw her back in May after she was hospitalized for small bowel obstruction.  She has had several of these and we had discussed at that time about possible exploration.  She had a CT scan which was fairly normal did show diverticulosis.  She has had episodes of diverticulitis but she says this is different.  She is not due for colonoscopy.  Her last 1 was in 2019 and she is scheduled for 10 years.  She has had several surgeries including gallbladder and pelvic surgery and was told that she had a lot of adhesions during those surgeries.  She is here today to discuss the possible exploration     From OV 5/02/24:  Pt is 68 yo F who was in the hosp at [facility] for SBO.  Here for follow up patient has had a history of diverticulitis and had surgery in 2001.  She has had a few flareups since that time and been hospitalized for antibiotics but she gets over it very quickly and has not had further surgery.  Her last colonoscopy was 2019 it was normal and she was scheduled for another 10-year follow-up.     This time when she was in the hospital the CT showed no inflammation around the colon all appeared to be a small bowel obstruction.  She has had a couple of those she thought.  She also noted that Dr. Mcmillan that she had some adhesions when he did the gallbladder surgery.     Patient presents today for evaluation of diverticulosis and SBO referred by PCP Dr. Smith. Patient was hospitalized in February at [facility] from 2/7/24 - 2/10/24 for SBO. Patient reports she had severe abdominal pain the weekend prior and made appointment with PCP on 2/7/24 and then was admitted and CT ABD Pelvis was done 2/8/24 with

## 2024-08-07 NOTE — TELEPHONE ENCOUNTER
[facility]     Pre-Operative Evaluation/Consultation    Name:  Alexus Harris                                         Age:  69 y.o.  MRN:  [id number]       :  1955   Date:  2024         Sex: female    There were no encounter diagnoses.    Surgeon:  Dr. Tenorio  Procedure (Planned): laparoscopic robotic assisted exploration with lysis of adhesions, possible small bowel resection  Date Scheduled surgery: 8/15/2024    Attending : No att. providers found    Primary Physician:   Cardiologist: None    Type of Anesthesia Requested: General    Patient Medical history:  Allergies   Allergen Reactions    Sulfa Antibiotics Hives    Metronidazole Nausea And Vomiting     Social History     Tobacco Use    Smoking status: Never    Smokeless tobacco: Never   Substance Use Topics    Alcohol use: No    Drug use: No         Additional ordered pre-operative testing:  []CBC    []ABG      [] BMP   []URINALYSIS   []CMP    []HCG   []COAGS PT/INR  []T&C  []LFTs   []TYPE AND SCREEN    [] EKG  [] Chest X-Ray  [] Other Radiology    [x] Sent to Hospitalist Dr. Mix  [] Sent to Anesthesia for your review:       [] Additional Orders: None     Comments:None   Requests: None    Signed: Radha Love RN 2024 10:19 AM

## 2024-08-07 NOTE — TELEPHONE ENCOUNTER
[facility]         Patient:Alexus Harris           :1955           Surgical/Procedure Planned: laparoscopic robotic assisted exploration with lysis of adhesions, possible small bowel resection    Date & Location: 8/15/2024 at [facility]       Outpatient (Possible admit after sx)  Planned Length of OR: 3 hrs    Sedation: general      Estimated Cardiac Risk for Non-Cardiac Surgery/Procedure     Low______ Moderate______ High______    Medication Instructions - Clarification needed by this date:       Farxiga Hold ___ Days  Toujeo  Hold ___ Days          Provider:Dr. Tenorio      Signature of Provider Giving Orders for Medication holds:    _____________________________________________

## 2024-08-08 NOTE — TELEPHONE ENCOUNTER
Spoke with patient, ordered EKG- she will get completed at [facility]. Order faxed. Will scan results in once received.

## 2024-08-12 NOTE — TELEPHONE ENCOUNTER
Per anesthesia, okay to take Farxiga morning of surgery and hold insulin day of surgery. Patient notified.

## 2024-08-12 NOTE — TELEPHONE ENCOUNTER
[facility]         Patient:Alexus Harris           :1955           Surgical/Procedure Planned: laparoscopic robotic assisted exploration with lysis of adhesions, possible small bowel resection    Date & Location: 8/15/2024 at [facility]       Outpatient (Possible admit after sx)  Planned Length of OR: 3 hrs    Sedation: general      Estimated Cardiac Risk for Non-Cardiac Surgery/Procedure     Low_xx___ Moderate______ High______    Medication Instructions - Clarification needed by this date:       Farxiga Hold _0__ Days- hold day of surgery until eating well  Toujeo  Hold _1__ Days- please take 1/2 dose of insulin night prior to surgery, hold day of surgery until eating well.           Provider:Dr. Tenorio      Signature of Provider Giving Orders for Medication holds:    ____Paige Smith MD_________

## 2024-08-12 NOTE — TELEPHONE ENCOUNTER
Contacted patient, she had EKG done on Friday. Left message on [facility] medical records voicemail requesting copy be faxed to our office.

## 2024-08-13 DIAGNOSIS — Z01.818 PRE-OP TESTING: ICD-10-CM

## 2024-08-13 PROCEDURE — 93010 ELECTROCARDIOGRAM REPORT: CPT | Performed by: SURGERY

## 2024-08-15 ENCOUNTER — HOSPITAL ENCOUNTER (OUTPATIENT)
Age: 69
Setting detail: OBSERVATION
Discharge: HOME | End: 2024-08-17
Attending: SURGERY | Admitting: SURGERY
Payer: MEDICARE

## 2024-08-15 ENCOUNTER — ANESTHESIA (OUTPATIENT)
Dept: OPERATING ROOM | Age: 69
End: 2024-08-15
Payer: MEDICARE

## 2024-08-15 ENCOUNTER — ANESTHESIA EVENT (OUTPATIENT)
Dept: OPERATING ROOM | Age: 69
End: 2024-08-15
Payer: MEDICARE

## 2024-08-15 DIAGNOSIS — Z87.19 S/P REPAIR OF VENTRAL HERNIA: ICD-10-CM

## 2024-08-15 DIAGNOSIS — G89.18 POST-OPERATIVE PAIN: Primary | ICD-10-CM

## 2024-08-15 DIAGNOSIS — Z98.890 S/P REPAIR OF VENTRAL HERNIA: ICD-10-CM

## 2024-08-15 PROBLEM — Z98.1 HISTORY OF CERVICAL SPINAL ARTHRODESIS: Status: ACTIVE | Noted: 2024-08-15

## 2024-08-15 PROBLEM — Z90.710 HISTORY OF ABDOMINAL HYSTERECTOMY: Status: ACTIVE | Noted: 2024-08-15

## 2024-08-15 LAB
ALBUMIN SERPL-MCNC: 3.7 G/DL (ref 3.5–5.2)
ALBUMIN/GLOB SERPL: 1.2 {RATIO} (ref 1–2.5)
ALP SERPL-CCNC: 91 U/L (ref 35–104)
ALT SERPL-CCNC: 19 U/L (ref 5–33)
ANION GAP SERPL CALCULATED.3IONS-SCNC: 12 MMOL/L (ref 9–17)
AST SERPL-CCNC: 24 U/L
BASOPHILS # BLD: 0.05 K/UL (ref 0–0.2)
BASOPHILS NFR BLD: 0 % (ref 0–2)
BILIRUB SERPL-MCNC: 0.2 MG/DL (ref 0.3–1.2)
BUN SERPL-MCNC: 11 MG/DL (ref 8–23)
BUN/CREAT SERPL: 16 (ref 9–20)
CALCIUM SERPL-MCNC: 9.1 MG/DL (ref 8.6–10.4)
CHLORIDE SERPL-SCNC: 108 MMOL/L (ref 98–107)
CO2 SERPL-SCNC: 21 MMOL/L (ref 20–31)
CREAT SERPL-MCNC: 0.7 MG/DL (ref 0.5–0.9)
EOSINOPHIL # BLD: <0.03 K/UL (ref 0–0.44)
EOSINOPHILS RELATIVE PERCENT: 0 % (ref 1–4)
ERYTHROCYTE [DISTWIDTH] IN BLOOD BY AUTOMATED COUNT: 13.8 % (ref 11.8–14.4)
GFR, ESTIMATED: >90 ML/MIN/1.73M2
GLUCOSE BLD-MCNC: 187 MG/DL (ref 65–105)
GLUCOSE SERPL-MCNC: 233 MG/DL (ref 70–99)
HCT VFR BLD AUTO: 38.3 % (ref 36.3–47.1)
HGB BLD-MCNC: 12.6 G/DL (ref 11.9–15.1)
IMM GRANULOCYTES # BLD AUTO: 0.09 K/UL (ref 0–0.3)
IMM GRANULOCYTES NFR BLD: 1 %
LYMPHOCYTES NFR BLD: 0.57 K/UL (ref 1.1–3.7)
LYMPHOCYTES RELATIVE PERCENT: 3 % (ref 24–43)
MAGNESIUM SERPL-MCNC: 1.8 MG/DL (ref 1.6–2.6)
MCH RBC QN AUTO: 28 PG (ref 25.2–33.5)
MCHC RBC AUTO-ENTMCNC: 32.9 G/DL (ref 25.2–33.5)
MCV RBC AUTO: 85.1 FL (ref 82.6–102.9)
MONOCYTES NFR BLD: 0.14 K/UL (ref 0.1–1.2)
MONOCYTES NFR BLD: 1 % (ref 3–12)
NEUTROPHILS NFR BLD: 95 % (ref 36–65)
NEUTS SEG NFR BLD: 16.23 K/UL (ref 1.5–8.1)
NRBC BLD-RTO: 0 PER 100 WBC
PLATELET # BLD AUTO: 336 K/UL (ref 138–453)
PMV BLD AUTO: 10.9 FL (ref 8.1–13.5)
POTASSIUM SERPL-SCNC: 3.8 MMOL/L (ref 3.7–5.3)
PROT SERPL-MCNC: 6.7 G/DL (ref 6.4–8.3)
RBC # BLD AUTO: 4.5 M/UL (ref 3.95–5.11)
SODIUM SERPL-SCNC: 141 MMOL/L (ref 135–144)
WBC OTHER # BLD: 17.1 K/UL (ref 3.5–11.3)

## 2024-08-15 PROCEDURE — 3600000019 HC SURGERY ROBOT ADDTL 15MIN: Performed by: SURGERY

## 2024-08-15 PROCEDURE — 6360000002 HC RX W HCPCS: Performed by: SURGERY

## 2024-08-15 PROCEDURE — G0378 HOSPITAL OBSERVATION PER HR: HCPCS

## 2024-08-15 PROCEDURE — 2500000003 HC RX 250 WO HCPCS: Performed by: NURSE ANESTHETIST, CERTIFIED REGISTERED

## 2024-08-15 PROCEDURE — 6360000002 HC RX W HCPCS: Performed by: NURSE ANESTHETIST, CERTIFIED REGISTERED

## 2024-08-15 PROCEDURE — 7100000001 HC PACU RECOVERY - ADDTL 15 MIN: Performed by: SURGERY

## 2024-08-15 PROCEDURE — 49594 RPR AA HRN 1ST 3-10 NCR/STRN: CPT | Performed by: SURGERY

## 2024-08-15 PROCEDURE — 2580000003 HC RX 258: Performed by: NURSE ANESTHETIST, CERTIFIED REGISTERED

## 2024-08-15 PROCEDURE — 3700000001 HC ADD 15 MINUTES (ANESTHESIA): Performed by: SURGERY

## 2024-08-15 PROCEDURE — 2580000003 HC RX 258: Performed by: SURGERY

## 2024-08-15 PROCEDURE — S2900 ROBOTIC SURGICAL SYSTEM: HCPCS | Performed by: SURGERY

## 2024-08-15 PROCEDURE — 80053 COMPREHEN METABOLIC PANEL: CPT

## 2024-08-15 PROCEDURE — 2500000003 HC RX 250 WO HCPCS: Performed by: SURGERY

## 2024-08-15 PROCEDURE — 2720000010 HC SURG SUPPLY STERILE: Performed by: SURGERY

## 2024-08-15 PROCEDURE — 6360000002 HC RX W HCPCS: Performed by: NURSE PRACTITIONER

## 2024-08-15 PROCEDURE — 7100000000 HC PACU RECOVERY - FIRST 15 MIN: Performed by: SURGERY

## 2024-08-15 PROCEDURE — 83735 ASSAY OF MAGNESIUM: CPT

## 2024-08-15 PROCEDURE — 6370000000 HC RX 637 (ALT 250 FOR IP): Performed by: SURGERY

## 2024-08-15 PROCEDURE — 85025 COMPLETE CBC W/AUTO DIFF WBC: CPT

## 2024-08-15 PROCEDURE — 2709999900 HC NON-CHARGEABLE SUPPLY: Performed by: SURGERY

## 2024-08-15 PROCEDURE — 2500000003 HC RX 250 WO HCPCS: Performed by: NURSE PRACTITIONER

## 2024-08-15 PROCEDURE — 82947 ASSAY GLUCOSE BLOOD QUANT: CPT

## 2024-08-15 PROCEDURE — 3700000000 HC ANESTHESIA ATTENDED CARE: Performed by: SURGERY

## 2024-08-15 PROCEDURE — 64488 TAP BLOCK BI INJECTION: CPT | Performed by: NURSE ANESTHETIST, CERTIFIED REGISTERED

## 2024-08-15 PROCEDURE — 99221 1ST HOSP IP/OBS SF/LOW 40: CPT | Performed by: NURSE PRACTITIONER

## 2024-08-15 PROCEDURE — 2580000003 HC RX 258: Performed by: NURSE PRACTITIONER

## 2024-08-15 PROCEDURE — 3600000009 HC SURGERY ROBOT BASE: Performed by: SURGERY

## 2024-08-15 PROCEDURE — 36415 COLL VENOUS BLD VENIPUNCTURE: CPT

## 2024-08-15 RX ORDER — DEXTROSE MONOHYDRATE 100 MG/ML
INJECTION, SOLUTION INTRAVENOUS CONTINUOUS PRN
Status: DISCONTINUED | OUTPATIENT
Start: 2024-08-15 | End: 2024-08-15 | Stop reason: HOSPADM

## 2024-08-15 RX ORDER — DEXAMETHASONE SODIUM PHOSPHATE 10 MG/ML
INJECTION INTRAMUSCULAR; INTRAVENOUS PRN
Status: DISCONTINUED | OUTPATIENT
Start: 2024-08-15 | End: 2024-08-15 | Stop reason: SDUPTHER

## 2024-08-15 RX ORDER — METOPROLOL TARTRATE 1 MG/ML
2.5 INJECTION, SOLUTION INTRAVENOUS EVERY 6 HOURS PRN
Status: DISCONTINUED | OUTPATIENT
Start: 2024-08-15 | End: 2024-08-17 | Stop reason: HOSPADM

## 2024-08-15 RX ORDER — FENTANYL CITRATE 50 UG/ML
25 INJECTION, SOLUTION INTRAMUSCULAR; INTRAVENOUS EVERY 5 MIN PRN
Status: DISCONTINUED | OUTPATIENT
Start: 2024-08-15 | End: 2024-08-15 | Stop reason: HOSPADM

## 2024-08-15 RX ORDER — OXYCODONE HYDROCHLORIDE 5 MG/1
5 TABLET ORAL EVERY 4 HOURS PRN
Status: DISCONTINUED | OUTPATIENT
Start: 2024-08-15 | End: 2024-08-17 | Stop reason: HOSPADM

## 2024-08-15 RX ORDER — ALBUTEROL SULFATE 2.5 MG/3ML
2.5 SOLUTION RESPIRATORY (INHALATION)
Status: DISCONTINUED | OUTPATIENT
Start: 2024-08-15 | End: 2024-08-16

## 2024-08-15 RX ORDER — DEXTROSE MONOHYDRATE 100 MG/ML
INJECTION, SOLUTION INTRAVENOUS CONTINUOUS PRN
Status: DISCONTINUED | OUTPATIENT
Start: 2024-08-15 | End: 2024-08-17 | Stop reason: HOSPADM

## 2024-08-15 RX ORDER — SODIUM CHLORIDE, SODIUM LACTATE, POTASSIUM CHLORIDE, CALCIUM CHLORIDE 600; 310; 30; 20 MG/100ML; MG/100ML; MG/100ML; MG/100ML
INJECTION, SOLUTION INTRAVENOUS CONTINUOUS
Status: DISCONTINUED | OUTPATIENT
Start: 2024-08-15 | End: 2024-08-15 | Stop reason: HOSPADM

## 2024-08-15 RX ORDER — SODIUM CHLORIDE 0.9 % (FLUSH) 0.9 %
5-40 SYRINGE (ML) INJECTION PRN
Status: DISCONTINUED | OUTPATIENT
Start: 2024-08-15 | End: 2024-08-17 | Stop reason: HOSPADM

## 2024-08-15 RX ORDER — SODIUM CHLORIDE 0.9 % (FLUSH) 0.9 %
5-40 SYRINGE (ML) INJECTION EVERY 12 HOURS SCHEDULED
Status: DISCONTINUED | OUTPATIENT
Start: 2024-08-15 | End: 2024-08-17 | Stop reason: HOSPADM

## 2024-08-15 RX ORDER — DEXTROSE, SODIUM CHLORIDE, SODIUM LACTATE, POTASSIUM CHLORIDE, AND CALCIUM CHLORIDE 5; .6; .31; .03; .02 G/100ML; G/100ML; G/100ML; G/100ML; G/100ML
INJECTION, SOLUTION INTRAVENOUS CONTINUOUS PRN
Status: DISCONTINUED | OUTPATIENT
Start: 2024-08-15 | End: 2024-08-15 | Stop reason: SDUPTHER

## 2024-08-15 RX ORDER — GLUCAGON 1 MG/ML
1 KIT INJECTION PRN
Status: DISCONTINUED | OUTPATIENT
Start: 2024-08-15 | End: 2024-08-17 | Stop reason: HOSPADM

## 2024-08-15 RX ORDER — ALBUTEROL SULFATE 2.5 MG/3ML
2.5 SOLUTION RESPIRATORY (INHALATION)
Status: DISCONTINUED | OUTPATIENT
Start: 2024-08-15 | End: 2024-08-17 | Stop reason: HOSPADM

## 2024-08-15 RX ORDER — ONDANSETRON 2 MG/ML
INJECTION INTRAMUSCULAR; INTRAVENOUS PRN
Status: DISCONTINUED | OUTPATIENT
Start: 2024-08-15 | End: 2024-08-15 | Stop reason: SDUPTHER

## 2024-08-15 RX ORDER — PROPOFOL 10 MG/ML
INJECTION, EMULSION INTRAVENOUS PRN
Status: DISCONTINUED | OUTPATIENT
Start: 2024-08-15 | End: 2024-08-15 | Stop reason: SDUPTHER

## 2024-08-15 RX ORDER — SODIUM CHLORIDE 9 MG/ML
INJECTION, SOLUTION INTRAVENOUS PRN
Status: DISCONTINUED | OUTPATIENT
Start: 2024-08-15 | End: 2024-08-15 | Stop reason: HOSPADM

## 2024-08-15 RX ORDER — SODIUM CHLORIDE 0.9 % (FLUSH) 0.9 %
5-40 SYRINGE (ML) INJECTION PRN
Status: DISCONTINUED | OUTPATIENT
Start: 2024-08-15 | End: 2024-08-15 | Stop reason: HOSPADM

## 2024-08-15 RX ORDER — BUPIVACAINE HYDROCHLORIDE AND EPINEPHRINE 2.5; 5 MG/ML; UG/ML
INJECTION, SOLUTION EPIDURAL; INFILTRATION; INTRACAUDAL; PERINEURAL PRN
Status: DISCONTINUED | OUTPATIENT
Start: 2024-08-15 | End: 2024-08-15 | Stop reason: SDUPTHER

## 2024-08-15 RX ORDER — ONDANSETRON 2 MG/ML
4 INJECTION INTRAMUSCULAR; INTRAVENOUS EVERY 6 HOURS PRN
Status: DISCONTINUED | OUTPATIENT
Start: 2024-08-15 | End: 2024-08-15

## 2024-08-15 RX ORDER — SODIUM CHLORIDE 9 MG/ML
INJECTION, SOLUTION INTRAVENOUS PRN
Status: DISCONTINUED | OUTPATIENT
Start: 2024-08-15 | End: 2024-08-17 | Stop reason: HOSPADM

## 2024-08-15 RX ORDER — ROCURONIUM BROMIDE 10 MG/ML
INJECTION, SOLUTION INTRAVENOUS PRN
Status: DISCONTINUED | OUTPATIENT
Start: 2024-08-15 | End: 2024-08-15 | Stop reason: SDUPTHER

## 2024-08-15 RX ORDER — SODIUM CHLORIDE 0.9 % (FLUSH) 0.9 %
5-40 SYRINGE (ML) INJECTION EVERY 12 HOURS SCHEDULED
Status: DISCONTINUED | OUTPATIENT
Start: 2024-08-15 | End: 2024-08-15 | Stop reason: HOSPADM

## 2024-08-15 RX ORDER — ONDANSETRON 2 MG/ML
4 INJECTION INTRAMUSCULAR; INTRAVENOUS EVERY 6 HOURS PRN
Status: DISCONTINUED | OUTPATIENT
Start: 2024-08-15 | End: 2024-08-17 | Stop reason: HOSPADM

## 2024-08-15 RX ORDER — ONDANSETRON 4 MG/1
4 TABLET, ORALLY DISINTEGRATING ORAL EVERY 8 HOURS PRN
Status: DISCONTINUED | OUTPATIENT
Start: 2024-08-15 | End: 2024-08-15

## 2024-08-15 RX ORDER — GLUCAGON 1 MG/ML
1 KIT INJECTION PRN
Status: DISCONTINUED | OUTPATIENT
Start: 2024-08-15 | End: 2024-08-15 | Stop reason: HOSPADM

## 2024-08-15 RX ORDER — NALOXONE HYDROCHLORIDE 0.4 MG/ML
INJECTION, SOLUTION INTRAMUSCULAR; INTRAVENOUS; SUBCUTANEOUS PRN
Status: DISCONTINUED | OUTPATIENT
Start: 2024-08-15 | End: 2024-08-15 | Stop reason: HOSPADM

## 2024-08-15 RX ORDER — INSULIN LISPRO 100 [IU]/ML
0-4 INJECTION, SOLUTION INTRAVENOUS; SUBCUTANEOUS EVERY 6 HOURS
Status: DISCONTINUED | OUTPATIENT
Start: 2024-08-16 | End: 2024-08-16

## 2024-08-15 RX ORDER — SODIUM CHLORIDE AND POTASSIUM CHLORIDE 150; 450 MG/100ML; MG/100ML
100 INJECTION, SOLUTION INTRAVENOUS CONTINUOUS
Status: DISCONTINUED | OUTPATIENT
Start: 2024-08-15 | End: 2024-08-17 | Stop reason: HOSPADM

## 2024-08-15 RX ORDER — SODIUM CHLORIDE, SODIUM LACTATE, POTASSIUM CHLORIDE, CALCIUM CHLORIDE 600; 310; 30; 20 MG/100ML; MG/100ML; MG/100ML; MG/100ML
INJECTION, SOLUTION INTRAVENOUS CONTINUOUS PRN
Status: DISCONTINUED | OUTPATIENT
Start: 2024-08-15 | End: 2024-08-15 | Stop reason: SDUPTHER

## 2024-08-15 RX ORDER — SODIUM CHLORIDE FOR INHALATION 0.9 %
3 VIAL, NEBULIZER (ML) INHALATION
Status: DISCONTINUED | OUTPATIENT
Start: 2024-08-15 | End: 2024-08-16

## 2024-08-15 RX ORDER — DEXMEDETOMIDINE HYDROCHLORIDE 100 UG/ML
INJECTION, SOLUTION INTRAVENOUS PRN
Status: DISCONTINUED | OUTPATIENT
Start: 2024-08-15 | End: 2024-08-15 | Stop reason: SDUPTHER

## 2024-08-15 RX ORDER — DROPERIDOL 2.5 MG/ML
0.62 INJECTION, SOLUTION INTRAMUSCULAR; INTRAVENOUS
Status: DISCONTINUED | OUTPATIENT
Start: 2024-08-15 | End: 2024-08-15 | Stop reason: HOSPADM

## 2024-08-15 RX ORDER — DEXTROSE MONOHYDRATE, SODIUM CHLORIDE, AND POTASSIUM CHLORIDE 50; 1.49; 4.5 G/1000ML; G/1000ML; G/1000ML
INJECTION, SOLUTION INTRAVENOUS CONTINUOUS
Status: DISCONTINUED | OUTPATIENT
Start: 2024-08-15 | End: 2024-08-15

## 2024-08-15 RX ORDER — IPRATROPIUM BROMIDE AND ALBUTEROL SULFATE 2.5; .5 MG/3ML; MG/3ML
1 SOLUTION RESPIRATORY (INHALATION)
Status: DISCONTINUED | OUTPATIENT
Start: 2024-08-15 | End: 2024-08-15 | Stop reason: HOSPADM

## 2024-08-15 RX ORDER — OXYCODONE HYDROCHLORIDE 5 MG/1
5 TABLET ORAL
Status: DISCONTINUED | OUTPATIENT
Start: 2024-08-15 | End: 2024-08-15

## 2024-08-15 RX ORDER — DIPHENHYDRAMINE HYDROCHLORIDE 50 MG/ML
25 INJECTION INTRAMUSCULAR; INTRAVENOUS EVERY 6 HOURS PRN
Status: DISCONTINUED | OUTPATIENT
Start: 2024-08-15 | End: 2024-08-17 | Stop reason: HOSPADM

## 2024-08-15 RX ORDER — SODIUM CHLORIDE, SODIUM LACTATE, POTASSIUM CHLORIDE, CALCIUM CHLORIDE 600; 310; 30; 20 MG/100ML; MG/100ML; MG/100ML; MG/100ML
INJECTION, SOLUTION INTRAVENOUS CONTINUOUS
Status: DISCONTINUED | OUTPATIENT
Start: 2024-08-15 | End: 2024-08-15

## 2024-08-15 RX ADMIN — OXYCODONE HYDROCHLORIDE 5 MG: 5 TABLET ORAL at 20:03

## 2024-08-15 RX ADMIN — SODIUM CHLORIDE, POTASSIUM CHLORIDE, SODIUM LACTATE AND CALCIUM CHLORIDE: 600; 310; 30; 20 INJECTION, SOLUTION INTRAVENOUS at 10:52

## 2024-08-15 RX ADMIN — HYDROMORPHONE HYDROCHLORIDE 1 MG: 1 INJECTION, SOLUTION INTRAMUSCULAR; INTRAVENOUS; SUBCUTANEOUS at 17:15

## 2024-08-15 RX ADMIN — PHENYLEPHRINE HYDROCHLORIDE 200 MCG: 10 INJECTION INTRAVENOUS at 15:36

## 2024-08-15 RX ADMIN — BUPIVACAINE HYDROCHLORIDE AND EPINEPHRINE 30 MG: 2.5; 5 INJECTION, SOLUTION EPIDURAL; INFILTRATION; INTRACAUDAL; PERINEURAL at 17:38

## 2024-08-15 RX ADMIN — SODIUM CHLORIDE, POTASSIUM CHLORIDE, SODIUM LACTATE AND CALCIUM CHLORIDE: 600; 310; 30; 20 INJECTION, SOLUTION INTRAVENOUS at 15:12

## 2024-08-15 RX ADMIN — PHENYLEPHRINE HYDROCHLORIDE 100 MCG: 10 INJECTION INTRAVENOUS at 15:20

## 2024-08-15 RX ADMIN — SUGAMMADEX 200 MG: 100 INJECTION, SOLUTION INTRAVENOUS at 17:40

## 2024-08-15 RX ADMIN — ROCURONIUM BROMIDE 50 MG: 10 INJECTION, SOLUTION INTRAVENOUS at 16:17

## 2024-08-15 RX ADMIN — DEXAMETHASONE SODIUM PHOSPHATE 10 MG: 10 INJECTION INTRAMUSCULAR; INTRAVENOUS at 14:38

## 2024-08-15 RX ADMIN — BUPIVACAINE HYDROCHLORIDE AND EPINEPHRINE 60 MG: 2.5; 5 INJECTION, SOLUTION EPIDURAL; INFILTRATION; INTRACAUDAL; PERINEURAL at 17:35

## 2024-08-15 RX ADMIN — SODIUM CHLORIDE, PRESERVATIVE FREE 10 ML: 5 INJECTION INTRAVENOUS at 20:25

## 2024-08-15 RX ADMIN — DIPHENHYDRAMINE HYDROCHLORIDE 25 MG: 50 INJECTION INTRAMUSCULAR; INTRAVENOUS at 22:54

## 2024-08-15 RX ADMIN — PHENYLEPHRINE HYDROCHLORIDE 100 MCG: 10 INJECTION INTRAVENOUS at 15:18

## 2024-08-15 RX ADMIN — Medication 2000 MG: at 14:35

## 2024-08-15 RX ADMIN — Medication 25 MG: at 14:38

## 2024-08-15 RX ADMIN — ROCURONIUM BROMIDE 100 MG: 10 INJECTION, SOLUTION INTRAVENOUS at 14:38

## 2024-08-15 RX ADMIN — SODIUM CHLORIDE, SODIUM LACTATE, POTASSIUM CHLORIDE, CALCIUM CHLORIDE AND DEXTROSE MONOHYDRATE: 5; 600; 310; 30; 20 INJECTION, SOLUTION INTRAVENOUS at 14:35

## 2024-08-15 RX ADMIN — DEXMEDETOMIDINE HYDROCHLORIDE 20 MCG: 100 INJECTION, SOLUTION, CONCENTRATE INTRAVENOUS at 14:38

## 2024-08-15 RX ADMIN — POTASSIUM CHLORIDE AND SODIUM CHLORIDE 100 ML/HR: 450; 150 INJECTION, SOLUTION INTRAVENOUS at 19:54

## 2024-08-15 RX ADMIN — SODIUM CHLORIDE, PRESERVATIVE FREE 20 MG: 5 INJECTION INTRAVENOUS at 20:25

## 2024-08-15 RX ADMIN — PROPOFOL 200 MG: 10 INJECTION, EMULSION INTRAVENOUS at 14:38

## 2024-08-15 RX ADMIN — SODIUM CHLORIDE, POTASSIUM CHLORIDE, SODIUM LACTATE AND CALCIUM CHLORIDE: 600; 310; 30; 20 INJECTION, SOLUTION INTRAVENOUS at 16:45

## 2024-08-15 RX ADMIN — PROPOFOL 150 MCG/KG/MIN: 10 INJECTION, EMULSION INTRAVENOUS at 14:39

## 2024-08-15 RX ADMIN — Medication 25 MG: at 14:49

## 2024-08-15 RX ADMIN — ONDANSETRON 4 MG: 2 INJECTION INTRAMUSCULAR; INTRAVENOUS at 14:38

## 2024-08-15 ASSESSMENT — PAIN DESCRIPTION - ORIENTATION: ORIENTATION: MID

## 2024-08-15 ASSESSMENT — PAIN DESCRIPTION - DESCRIPTORS
DESCRIPTORS: DISCOMFORT
DESCRIPTORS: SHARP

## 2024-08-15 ASSESSMENT — PAIN SCALES - GENERAL
PAINLEVEL_OUTOF10: 2
PAINLEVEL_OUTOF10: 1
PAINLEVEL_OUTOF10: 3

## 2024-08-15 ASSESSMENT — PAIN - FUNCTIONAL ASSESSMENT
PAIN_FUNCTIONAL_ASSESSMENT: ACTIVITIES ARE NOT PREVENTED
PAIN_FUNCTIONAL_ASSESSMENT: 0-10
PAIN_FUNCTIONAL_ASSESSMENT: 0-10

## 2024-08-15 ASSESSMENT — PAIN SCALES - WONG BAKER: WONGBAKER_NUMERICALRESPONSE: NO HURT

## 2024-08-15 ASSESSMENT — PAIN DESCRIPTION - PAIN TYPE: TYPE: SURGICAL PAIN

## 2024-08-15 ASSESSMENT — PAIN DESCRIPTION - LOCATION
LOCATION: ABDOMEN
LOCATION: ABDOMEN

## 2024-08-15 NOTE — H&P
Alexus Harris is a 69 y.o. female      CC:    Abdominal pain bloating and nausea    HISTORY OF PRESENT ILLNESS:    Patient is a 69-year-old female who we have been seeing this year for abdominal pain specifically the right side with bloating nausea.  The pain is fairly constant on a daily basis and is getting worse.  We saw her back in May after she was hospitalized for small bowel obstruction.  She has had several of these and we had discussed at that time about possible exploration.  She had a CT scan which was fairly normal did show diverticulosis.  She has had episodes of diverticulitis but she says this is different.  She is not due for colonoscopy.  Her last 1 was in 2019 and she is scheduled for 10 years.  She has had several surgeries including gallbladder and pelvic surgery and was told that she had a lot of adhesions during those surgeries.  She is here today to discuss the possible exploration     From OV 5/02/24:  Pt is 70 yo F who was in the hosp at [facility] for SBO.  Here for follow up patient has had a history of diverticulitis and had surgery in 2001.  She has had a few flareups since that time and been hospitalized for antibiotics but she gets over it very quickly and has not had further surgery.  Her last colonoscopy was 2019 it was normal and she was scheduled for another 10-year follow-up.     This time when she was in the hospital the CT showed no inflammation around the colon all appeared to be a small bowel obstruction.  She has had a couple of those she thought.  She also noted that Dr. Mcmillan that she had some adhesions when he did the gallbladder surgery.     Patient presents today for evaluation of diverticulosis and SBO referred by PCP Dr. Smith. Patient was hospitalized in February at [facility] from 2/7/24 - 2/10/24 for SBO. Patient reports she had severe abdominal pain the weekend prior and made appointment with PCP on 2/7/24 and then was admitted and CT ABD Pelvis was done 2/8/24 with

## 2024-08-15 NOTE — ANESTHESIA PRE PROCEDURE
Department of Anesthesiology  Preprocedure Note       Name:  Alexus Harris   Age:  69 y.o.  :  1955                                          MRN:  [id number]         Date:  8/15/2024      Surgeon: Surgeon(s):  Rivera Tenorio MD    Procedure: Procedure(s):  Laparoscopic robotic assisted exploration with lysis of adhesions possible small bowel resection    Medications prior to admission:   Prior to Admission medications    Medication Sig Start Date End Date Taking? Authorizing Provider   metFORMIN (GLUCOPHAGE) 500 MG tablet Take 1 tablet by mouth 3 times daily   Yes ProviderLaura MD   Insulin Glargine, 2 Unit Dial, (TOUJEO MAX SOLOSTAR) 300 UNIT/ML SOPN Inject 12 Units into the skin daily 24  Yes Paige Smith MD   metoprolol succinate (TOPROL XL) 25 MG extended release tablet Take 1/2 (one-half) tablet by mouth twice daily 24  Yes Paige Smith MD   rosuvastatin (CRESTOR) 10 MG tablet Take 1 tablet by mouth nightly 24  Yes Paige Smith MD   Continuous Glucose Sensor (FREESTYLE JUAN ALBERTO 3 SENSOR) MISC 1 each by Does not apply route every 14 days 24  Yes Paige Smith MD   MAGNESIUM PO Take 1 capsule by mouth Daily 24  Yes Laura Dennis MD   omeprazole (PRILOSEC) 20 MG delayed release capsule Take 1 capsule by mouth once daily 3/27/24  Yes Paige Smith MD   ondansetron (ZOFRAN-ODT) 4 MG disintegrating tablet Take 1 tablet by mouth 3 times daily as needed for Nausea or Vomiting 24  Yes Paige Smith MD   amLODIPine (NORVASC) 10 MG tablet Take 1 tablet by mouth once daily 23  Yes Paige Smith MD   Insulin Pen Needle (DANGELO PEN NEEDLES) 31G X 8 MM MISC 1 each by Does not apply route daily 23  Yes Paige Smith MD   Continuous Blood Gluc Sensor (FREESTYLE JUAN ALBERTO 2 SENSOR) MISC 1 each by Does not apply route 4 times daily 23  Yes Paige Smith MD   FARXIGA 10 MG tablet Take 1 tablet by mouth every morning 8/10/23  Yes

## 2024-08-15 NOTE — ANESTHESIA POSTPROCEDURE EVALUATION
Department of Anesthesiology  Postprocedure Note    Patient: Alexus Harris  MRN: [id number]  YOB: 1955  Date of evaluation: 8/15/2024    Procedure Summary       Date: 08/15/24 Room / Location: [address]    Anesthesia Start: 1435 Anesthesia Stop: 1755    Procedure: Laparoscopic robotic assisted exploration with lysis of adhesions, ventral incisional hernia repair Diagnosis:       History of small bowel obstruction      Diverticulosis      Nausea and vomiting, unspecified vomiting type      Abdominal bloating      Generalized abdominal pain      (History of small bowel obstruction [Z87.19])      (Diverticulosis [K57.90])      (Nausea and vomiting, unspecified vomiting type [R11.2])      (Abdominal bloating [R14.0])      (Generalized abdominal pain [R10.84])    Surgeons: Rivera Tenorio MD Responsible Provider: Mitesh Chow APRN - CRNA    Anesthesia Type: General, TIVA ASA Status: 2            Anesthesia Type: General, TIVA    Douglas Phase I: Douglas Score: 8    Douglas Phase II:      Anesthesia Post Evaluation    Patient location during evaluation: PACU  Level of consciousness: awake and alert  Airway patency: patent  Nausea & Vomiting: no nausea and no vomiting  Cardiovascular status: hemodynamically stable  Respiratory status: spontaneous ventilation  Hydration status: stable  Multimodal analgesia pain management approach  Pain management: satisfactory to patient    No notable events documented.

## 2024-08-15 NOTE — ANESTHESIA PROCEDURE NOTES
Peripheral Block    Patient location during procedure: OR  Reason for block: post-op pain management and at surgeon's request  Start time: 8/15/2024 5:35 PM  End time: 8/15/2024 5:38 PM  Staffing  Performed: resident/CRNA   Resident/CRNA: Mitesh Chow APRN - CRNA  Performed by: Mitesh Chow APRN - CRNA  Authorized by: Mitesh Chow APRN - CRNA    Preanesthetic Checklist  Completed: patient identified, IV checked, site marked, risks and benefits discussed, surgical/procedural consents, equipment checked, pre-op evaluation, timeout performed, anesthesia consent given, oxygen available, monitors applied/VS acknowledged, fire risk safety assessment completed and verbalized and blood product R/B/A discussed and consented  Peripheral Block   Patient position: supine  Prep: ChloraPrep  Provider prep: mask and sterile gloves  Patient monitoring: cardiac monitor, continuous pulse ox, continuous capnometry, frequent blood pressure checks, IV access and oxygen  Block type: Rectus sheath  Laterality: bilateral  Injection technique: single-shot  Guidance: ultrasound guided    Needle   Needle type: insulated echogenic nerve stimulator needle   Needle gauge: 20 G  Needle localization: anatomical landmarks and ultrasound guidance  Assessment   Injection assessment: negative aspiration for heme, local visualized surrounding nerve on ultrasound and no intravascular symptoms  Slow fractionated injection: yes  Hemodynamics: stable  Outcomes: patient tolerated procedure well and uncomplicated    Additional Notes  15ml each side

## 2024-08-15 NOTE — PROGRESS NOTES
Spiritual Health Assessment/Progress Note  [facility]    (P) Initial Encounter, Spiritual/Emotional Needs, Pre-Op,  ,  ,      Name: Alexus Harris MRN: [id number]    Age: 69 y.o.     Sex: female   Language: English   Sikhism: Presybeterian   <principal problem not specified>     Date: 8/15/2024            Total Time Calculated: (P) 18 min              Spiritual Assessment began in [facility]  OR        Referral/Consult From: (P) Rounding   Encounter Overview/Reason: (P) Initial Encounter, Spiritual/Emotional Needs, Pre-Op  Service Provided For: (P) Patient and family together    Patient was prepped for surgery and accompanied by her  (Clinton), daughter, son, granddaughter and grandson.  Patient has a positive attitude, hopeful expectation, large support group, and strong oralia in God.     08/15/24 1149   Encounter Summary   Encounter Overview/Reason Initial Encounter;Spiritual/Emotional Needs;Pre-Op   Service Provided For Patient and family together   Referral/Consult From Rounding   Support System Spouse;Children;Family members;Confucianist/oralia community;Friends/neighbors   Last Encounter  08/15/24   Complexity of Encounter Low   Begin Time 1056   End Time  1114   Total Time Calculated 18 min   Spiritual/Emotional needs   Type Spiritual Support   Assessment/Intervention/Outcome   Assessment Calm;Coping;Hopeful   Intervention Active listening;Discussed belief system/Confucianism practices/oralia;Prayer (assurance of)/[place];Sustaining Presence/Ministry of presence   Outcome Acceptance;Encouraged;Engaged in conversation;Expressed Gratitude;Optimistic;Receptive       Oralia, Belief, Meaning:   Patient identifies as spiritual, is connected with a oralia tradition or spiritual practice, and has beliefs or practices that help with coping during difficult times  Family/Friends identify as spiritual, are connected with a oralia tradition or spiritual practice, and have beliefs or practices that help with coping

## 2024-08-15 NOTE — ANESTHESIA PROCEDURE NOTES
Peripheral Block    Patient location during procedure: OR  Reason for block: post-op pain management and at surgeon's request  Start time: 8/15/2024 5:30 PM  End time: 8/15/2024 5:35 PM  Staffing  Performed: resident/CRNA   Resident/CRNA: Mitesh Chow APRN - CRNA  Performed by: Mitesh Chow APRN - CRNA  Authorized by: Mitesh Chow APRN - CRNA    Preanesthetic Checklist  Completed: patient identified, IV checked, site marked, risks and benefits discussed, surgical/procedural consents, equipment checked, pre-op evaluation, timeout performed, anesthesia consent given, oxygen available, monitors applied/VS acknowledged, fire risk safety assessment completed and verbalized and blood product R/B/A discussed and consented  Peripheral Block   Patient position: supine  Prep: ChloraPrep  Provider prep: mask and sterile gloves  Patient monitoring: cardiac monitor, continuous pulse ox, continuous capnometry, frequent blood pressure checks, IV access and oxygen  Block type: TAP  Laterality: bilateral  Injection technique: single-shot  Guidance: ultrasound guided    Needle   Needle type: insulated echogenic nerve stimulator needle   Needle gauge: 20 G  Needle localization: ultrasound guidance and anatomical landmarks  Assessment   Injection assessment: negative aspiration for heme, local visualized surrounding nerve on ultrasound and no intravascular symptoms  Slow fractionated injection: yes  Hemodynamics: stable  Outcomes: uncomplicated and patient tolerated procedure well

## 2024-08-16 LAB
ANION GAP SERPL CALCULATED.3IONS-SCNC: 12 MMOL/L (ref 9–17)
BUN SERPL-MCNC: 10 MG/DL (ref 8–23)
BUN/CREAT SERPL: 20 (ref 9–20)
CALCIUM SERPL-MCNC: 8.6 MG/DL (ref 8.6–10.4)
CHLORIDE SERPL-SCNC: 108 MMOL/L (ref 98–107)
CO2 SERPL-SCNC: 20 MMOL/L (ref 20–31)
CREAT SERPL-MCNC: 0.5 MG/DL (ref 0.5–0.9)
ERYTHROCYTE [DISTWIDTH] IN BLOOD BY AUTOMATED COUNT: 13.5 % (ref 11.8–14.4)
GFR, ESTIMATED: >90 ML/MIN/1.73M2
GLUCOSE BLD-MCNC: 113 MG/DL (ref 65–105)
GLUCOSE BLD-MCNC: 115 MG/DL (ref 65–105)
GLUCOSE BLD-MCNC: 156 MG/DL (ref 65–105)
GLUCOSE BLD-MCNC: 242 MG/DL (ref 65–105)
GLUCOSE SERPL-MCNC: 159 MG/DL (ref 70–99)
HCT VFR BLD AUTO: 38.9 % (ref 36.3–47.1)
HGB BLD-MCNC: 12.5 G/DL (ref 11.9–15.1)
MCH RBC QN AUTO: 27.5 PG (ref 25.2–33.5)
MCHC RBC AUTO-ENTMCNC: 32.1 G/DL (ref 25.2–33.5)
MCV RBC AUTO: 85.5 FL (ref 82.6–102.9)
NRBC BLD-RTO: 0 PER 100 WBC
PLATELET # BLD AUTO: 288 K/UL (ref 138–453)
PMV BLD AUTO: 10.6 FL (ref 8.1–13.5)
POTASSIUM SERPL-SCNC: 4.1 MMOL/L (ref 3.7–5.3)
RBC # BLD AUTO: 4.55 M/UL (ref 3.95–5.11)
SODIUM SERPL-SCNC: 140 MMOL/L (ref 135–144)
WBC OTHER # BLD: 10.6 K/UL (ref 3.5–11.3)

## 2024-08-16 PROCEDURE — 6370000000 HC RX 637 (ALT 250 FOR IP): Performed by: NURSE PRACTITIONER

## 2024-08-16 PROCEDURE — 2580000003 HC RX 258: Performed by: SURGERY

## 2024-08-16 PROCEDURE — G0378 HOSPITAL OBSERVATION PER HR: HCPCS

## 2024-08-16 PROCEDURE — 96375 TX/PRO/DX INJ NEW DRUG ADDON: CPT

## 2024-08-16 PROCEDURE — 80048 BASIC METABOLIC PNL TOTAL CA: CPT

## 2024-08-16 PROCEDURE — 85027 COMPLETE CBC AUTOMATED: CPT

## 2024-08-16 PROCEDURE — 94669 MECHANICAL CHEST WALL OSCILL: CPT

## 2024-08-16 PROCEDURE — 82947 ASSAY GLUCOSE BLOOD QUANT: CPT

## 2024-08-16 PROCEDURE — 6370000000 HC RX 637 (ALT 250 FOR IP): Performed by: SURGERY

## 2024-08-16 PROCEDURE — 96374 THER/PROPH/DIAG INJ IV PUSH: CPT

## 2024-08-16 PROCEDURE — 2580000003 HC RX 258: Performed by: NURSE PRACTITIONER

## 2024-08-16 PROCEDURE — 96376 TX/PRO/DX INJ SAME DRUG ADON: CPT

## 2024-08-16 PROCEDURE — 2500000003 HC RX 250 WO HCPCS: Performed by: NURSE PRACTITIONER

## 2024-08-16 PROCEDURE — 94760 N-INVAS EAR/PLS OXIMETRY 1: CPT

## 2024-08-16 PROCEDURE — 6370000000 HC RX 637 (ALT 250 FOR IP): Performed by: INTERNAL MEDICINE

## 2024-08-16 PROCEDURE — 6360000002 HC RX W HCPCS: Performed by: SURGERY

## 2024-08-16 PROCEDURE — 6360000002 HC RX W HCPCS: Performed by: NURSE PRACTITIONER

## 2024-08-16 PROCEDURE — 36415 COLL VENOUS BLD VENIPUNCTURE: CPT

## 2024-08-16 RX ORDER — AMLODIPINE BESYLATE 5 MG/1
10 TABLET ORAL DAILY
Status: DISCONTINUED | OUTPATIENT
Start: 2024-08-16 | End: 2024-08-17 | Stop reason: HOSPADM

## 2024-08-16 RX ORDER — METOPROLOL SUCCINATE 25 MG/1
12.5 TABLET, EXTENDED RELEASE ORAL DAILY
Status: DISCONTINUED | OUTPATIENT
Start: 2024-08-16 | End: 2024-08-17 | Stop reason: HOSPADM

## 2024-08-16 RX ORDER — INSULIN LISPRO 100 [IU]/ML
0-4 INJECTION, SOLUTION INTRAVENOUS; SUBCUTANEOUS
Status: DISCONTINUED | OUTPATIENT
Start: 2024-08-16 | End: 2024-08-17 | Stop reason: HOSPADM

## 2024-08-16 RX ORDER — HYDROCODONE BITARTRATE AND ACETAMINOPHEN 5; 325 MG/1; MG/1
1 TABLET ORAL EVERY 6 HOURS PRN
Qty: 28 TABLET | Refills: 0 | Status: SHIPPED | OUTPATIENT
Start: 2024-08-16 | End: 2024-08-17

## 2024-08-16 RX ORDER — DAPAGLIFLOZIN 10 MG/1
10 TABLET, FILM COATED ORAL EVERY MORNING
Status: DISCONTINUED | OUTPATIENT
Start: 2024-08-16 | End: 2024-08-17 | Stop reason: HOSPADM

## 2024-08-16 RX ORDER — INSULIN GLARGINE 100 [IU]/ML
10 INJECTION, SOLUTION SUBCUTANEOUS NIGHTLY
Status: DISCONTINUED | OUTPATIENT
Start: 2024-08-16 | End: 2024-08-17 | Stop reason: HOSPADM

## 2024-08-16 RX ADMIN — SODIUM CHLORIDE, PRESERVATIVE FREE 10 ML: 5 INJECTION INTRAVENOUS at 08:57

## 2024-08-16 RX ADMIN — INSULIN LISPRO 2 UNITS: 100 INJECTION, SOLUTION INTRAVENOUS; SUBCUTANEOUS at 13:08

## 2024-08-16 RX ADMIN — HYDROMORPHONE HYDROCHLORIDE 0.25 MG: 1 INJECTION, SOLUTION INTRAMUSCULAR; INTRAVENOUS; SUBCUTANEOUS at 05:38

## 2024-08-16 RX ADMIN — SODIUM CHLORIDE, PRESERVATIVE FREE 10 ML: 5 INJECTION INTRAVENOUS at 19:18

## 2024-08-16 RX ADMIN — DIPHENHYDRAMINE HYDROCHLORIDE 25 MG: 50 INJECTION INTRAMUSCULAR; INTRAVENOUS at 13:22

## 2024-08-16 RX ADMIN — OXYCODONE HYDROCHLORIDE 5 MG: 5 TABLET ORAL at 22:59

## 2024-08-16 RX ADMIN — POTASSIUM CHLORIDE AND SODIUM CHLORIDE 100 ML/HR: 450; 150 INJECTION, SOLUTION INTRAVENOUS at 05:33

## 2024-08-16 RX ADMIN — OXYCODONE HYDROCHLORIDE 5 MG: 5 TABLET ORAL at 09:13

## 2024-08-16 RX ADMIN — SODIUM CHLORIDE, PRESERVATIVE FREE 20 MG: 5 INJECTION INTRAVENOUS at 21:09

## 2024-08-16 RX ADMIN — DIPHENHYDRAMINE HYDROCHLORIDE 25 MG: 50 INJECTION INTRAMUSCULAR; INTRAVENOUS at 19:16

## 2024-08-16 RX ADMIN — HYDROMORPHONE HYDROCHLORIDE 0.25 MG: 1 INJECTION, SOLUTION INTRAMUSCULAR; INTRAVENOUS; SUBCUTANEOUS at 01:53

## 2024-08-16 RX ADMIN — INSULIN GLARGINE 10 UNITS: 100 INJECTION, SOLUTION SUBCUTANEOUS at 21:13

## 2024-08-16 RX ADMIN — SODIUM CHLORIDE, PRESERVATIVE FREE 20 ML: 5 INJECTION INTRAVENOUS at 13:22

## 2024-08-16 RX ADMIN — METOPROLOL SUCCINATE 12.5 MG: 25 TABLET, EXTENDED RELEASE ORAL at 13:20

## 2024-08-16 RX ADMIN — DAPAGLIFLOZIN 10 MG: 10 TABLET, FILM COATED ORAL at 13:09

## 2024-08-16 RX ADMIN — OXYCODONE HYDROCHLORIDE 5 MG: 5 TABLET ORAL at 13:11

## 2024-08-16 RX ADMIN — SODIUM CHLORIDE, PRESERVATIVE FREE 10 ML: 5 INJECTION INTRAVENOUS at 21:13

## 2024-08-16 RX ADMIN — OXYCODONE HYDROCHLORIDE 5 MG: 5 TABLET ORAL at 18:18

## 2024-08-16 RX ADMIN — AMLODIPINE BESYLATE 10 MG: 5 TABLET ORAL at 13:20

## 2024-08-16 RX ADMIN — POTASSIUM CHLORIDE AND SODIUM CHLORIDE 100 ML/HR: 450; 150 INJECTION, SOLUTION INTRAVENOUS at 15:15

## 2024-08-16 RX ADMIN — DIPHENHYDRAMINE HYDROCHLORIDE 25 MG: 50 INJECTION INTRAMUSCULAR; INTRAVENOUS at 05:33

## 2024-08-16 RX ADMIN — SODIUM CHLORIDE, PRESERVATIVE FREE 20 MG: 5 INJECTION INTRAVENOUS at 08:56

## 2024-08-16 ASSESSMENT — PAIN SCALES - GENERAL
PAINLEVEL_OUTOF10: 5
PAINLEVEL_OUTOF10: 5
PAINLEVEL_OUTOF10: 2
PAINLEVEL_OUTOF10: 3
PAINLEVEL_OUTOF10: 7
PAINLEVEL_OUTOF10: 6
PAINLEVEL_OUTOF10: 9
PAINLEVEL_OUTOF10: 7
PAINLEVEL_OUTOF10: 6

## 2024-08-16 ASSESSMENT — PAIN DESCRIPTION - LOCATION
LOCATION: ABDOMEN

## 2024-08-16 ASSESSMENT — PAIN - FUNCTIONAL ASSESSMENT
PAIN_FUNCTIONAL_ASSESSMENT: ACTIVITIES ARE NOT PREVENTED
PAIN_FUNCTIONAL_ASSESSMENT: PREVENTS OR INTERFERES SOME ACTIVE ACTIVITIES AND ADLS

## 2024-08-16 ASSESSMENT — PAIN SCALES - WONG BAKER: WONGBAKER_NUMERICALRESPONSE: NO HURT

## 2024-08-16 ASSESSMENT — PAIN DESCRIPTION - ORIENTATION
ORIENTATION: RIGHT
ORIENTATION: UPPER
ORIENTATION: MID
ORIENTATION: UPPER

## 2024-08-16 ASSESSMENT — PAIN DESCRIPTION - DESCRIPTORS
DESCRIPTORS: DISCOMFORT
DESCRIPTORS: DISCOMFORT
DESCRIPTORS: ACHING
DESCRIPTORS: SORE
DESCRIPTORS: SORE

## 2024-08-16 NOTE — CARE COORDINATION
DISCHARGE BARRIERS       Reason for Referral:  SW completed a Psychosocial Assessment for evaluation of patient's mental health, social status, and functional capacity within the community. Alexus Rendon is a 69 y.o. female admitted due to S/P repair of ventral hernia. Patient alone. SW provided supportive listening while patient discussed past medical history and events leading up to hospitalization.       Mental Status:  Alert, oriented, and engaging during assessment.     Decision Making:  Makes own decisions.     Family/Social/Home Environment: lives with their spouse    Employment status: Retired     Health Insurance:     Active Insurance as of 8/15/2024       Primary Coverage       Payor Plan Insurance Group Employer/Plan Group    MEDICARE MEDICARE PART A AND B        Payor Address Payor Phone Number Payor Fax Number Effective Dates    [address] [address] [phone]  5/1/2019 - None Entered    [facility] [id number]         Subscriber Name Subscriber Birth Date Member ID       ALEXUS RENDON 1955 [id number]               Secondary Coverage       Payor Plan Insurance Group Employer/Plan Group    UNITED HE[employer] AARP HEALTH CARE MEDICARE SUPP        Payor Plan Address Payor Plan Phone Number Payor Plan Fax Number Effective Dates    [address] [phone]  1/1/2023 - None Entered    [facility] [id number]         Subscriber Name Subscriber Birth Date Member ID       ALEXUS RENDON 1955 [id number]                     Support: Discussed a good social support network     Current Services:  None      Current DMEs: none reported     PCP: Paige Smith MD and repots no issues affording medication.      status:   No     ADLs and means of transportation: Independent in ADLs/IADLs in ADLs prior to hospitalization and able to transport self.    Food insecurity or needed financial assistance: Denies any food insecurity or financial concerns at this time.    Income Source: social security  and [employer] ([employer]      ACP and Code Status:  SW discussed an Advance Directive which included the patient's choices for care and treatment in the case of a health event that adversely affects decision-making abilities. SW provided education and resources. Alexus Harris has no questions at this time and has agreed to keep me up-to-date should anything change.    Alexus Harris is a Full Code status and has NO advanced directive - not interested in additional information.      Collaborative List of SNF/ECF/HH were provided: offered, declined No discharge order at this time.    Anticipated Needs/Discharge Plan:  Spoke with patient/family/representative about discharge plan. Patient/Family/Representative verbalizes understanding of the plan of care and denies discharge needs or further services at this time. SW provided business card. SW will continue to monitor needs and assist as appropriate.                Electronically signed by LEONA Devine on 8/16/2024 at 10:08 AM

## 2024-08-16 NOTE — OP NOTE
Operative Note      Patient: Alexus Harris  YOB: 1955  MRN: [id number]    Date of Procedure: 8/15/2024    Pre-Op Diagnosis Codes:      * History of small bowel obstruction [Z87.19]     * Diverticulosis [K57.90]     * Nausea and vomiting, unspecified vomiting type [R11.2]     * Abdominal bloating [R14.0]     * Generalized abdominal pain [R10.84]    Post-Op Diagnosis:   Multiple adhesions to ant abdominal wall and between the loops of SB, mya in the pelvis.    2.   Ventral hernia 3.5  cm L and 2 cm W defect         Procedure(s):  Laparoscopic robotic assisted exploration with lysis of adhesions, ventral incisional hernia repair    Surgeon(s):  Rivera Tenorio MD    Assistant:   * No surgical staff found *    Anesthesia: General    Estimated Blood Loss (mL): less than 50     Complications: None    Specimens:   * No specimens in log *    Implants:  * No implants in log *      Drains:   Urinary Catheter 08/15/24 2 Way (Active)   Catheter Indications Perioperative use for selected surgical procedures 08/15/24 2000   Site Assessment Pink;No urethral drainage 08/15/24 2000   Urine Color Yellow 08/15/24 1753   Urine Appearance Clear 08/15/24 1753   Collection Container Standard 08/15/24 1753   Securement Method Securing device (Describe) 08/15/24 1753   Status Draining 08/15/24 1753       Findings:  Infection Present At Time Of Surgery (PATOS) (choose all levels that have infection present):  No infection present  Other Findings: multiple adhesions with ventral hernia      Detailed Description of Procedure:   Patient was taken to the OR placed in a supine position the abdomen was prepped and draped in the usual sterile fashion.  Entered the abdomen using a Veress needle and insufflating the abdomen to 15 mmHg CO2.  We started by placing a 5 mm Optiview trocar in the upper epigastric area.  We got in without difficulty.  There were adhesions along the old midline with some loops of small bowel up to the anterior

## 2024-08-16 NOTE — PROGRESS NOTES
SPIRITUAL CARE DEPARTMENT - [facility]  PROGRESS NOTE    Shift date: 08/16/2024  Shift day: Friday     Room # 0215/0215-01   Name: Alexus Harris                  Referral: Routine Visit    Admit Date & Time: 8/15/2024 10:02 AM    Assessment:  Alexus Harris is a 69 y.o. female in the hospital because ventral herna repair. Upon entering the room writer observes patient in hospital bed talking on phone.  Patient is one day post surgery and states she is very sore.  She states she is supposed to go home today, but would prefer not to.  She is her husbands caregiver and would like another day to rest.  Patient was very pleased with her care and especially Dr Tenorio.      Intervention:   provided a listening and supportive presence.       Outcome:  Patient seemed comforted by prayer and expressed gratitude for visit.      Plan:  Chaplains will remain available to offer spiritual and emotional support as needed.    Electronically signed by MARY SYED MA on 8/16/2024 at 10:53 AM        08/16/24 1038   Encounter Summary   Encounter Overview/Reason Initial Encounter   Service Provided For Patient   Referral/Consult From [facility] [place]   Support System Spouse;Children   Last Encounter  08/16/24   Complexity of Encounter Low   Begin Time 1015   End Time  1030   Total Time Calculated 15 min   Spiritual/Emotional needs   Type Spiritual Support   Assessment/Intervention/Outcome   Assessment Calm;Coping   Intervention Active listening;Discussed belief system/Synagogue practices/max;Prayer (assurance of)/[place];Sustaining Presence/Ministry of presence   Outcome Acceptance;Encouraged;Engaged in conversation;Expressed Gratitude     Electronically signed by MARY SYED MA on 8/16/2024 at 10:54 AM

## 2024-08-16 NOTE — PLAN OF CARE
Problem: Chronic Conditions and Co-morbidities  Goal: Patient's chronic conditions and co-morbidity symptoms are monitored and maintained or improved  8/16/2024 0909 by Glenis Rodriguez RN  Outcome: Progressing  Flowsheets (Taken 8/16/2024 0905)  Care Plan - Patient's Chronic Conditions and Co-Morbidity Symptoms are Monitored and Maintained or Improved:   Monitor and assess patient's chronic conditions and comorbid symptoms for stability, deterioration, or improvement   Collaborate with multidisciplinary team to address chronic and comorbid conditions and prevent exacerbation or deterioration   Update acute care plan with appropriate goals if chronic or comorbid symptoms are exacerbated and prevent overall improvement and discharge  8/15/2024 2340 by Anna Henriquez RN  Outcome: Progressing     Problem: Discharge Planning  Goal: Discharge to home or other facility with appropriate resources  8/16/2024 0909 by Glenis Rodriguez RN  Outcome: Progressing  Flowsheets (Taken 8/16/2024 0905)  Discharge to home or other facility with appropriate resources:   Identify barriers to discharge with patient and caregiver   Arrange for needed discharge resources and transportation as appropriate   Identify discharge learning needs (meds, wound care, etc)   Refer to discharge planning if patient needs post-hospital services based on physician order or complex needs related to functional status, cognitive ability or social support system  8/15/2024 2340 by Anna Henriquez RN  Outcome: Progressing     Problem: Pain  Goal: Verbalizes/displays adequate comfort level or baseline comfort level  8/16/2024 0909 by Glenis Rodriguez RN  Outcome: Progressing  8/15/2024 2340 by Anna Henriquez RN  Outcome: Progressing     Problem: ABCDS Injury Assessment  Goal: Absence of physical injury  8/16/2024 0909 by Glenis Rodriguez RN  Outcome: Progressing  8/15/2024 2340 by Anna Henriquez RN  Outcome: Progressing     Problem: Safety -

## 2024-08-16 NOTE — CARE COORDINATION
Case Management Assessment  Initial Evaluation    Date/Time of Evaluation: 8/16/2024 9:36 AM  Assessment Completed by: Raf Navarrete RN    If patient is discharged prior to next notation, then this note serves as note for discharge by case management.    Date / Time: 8/15/2024 10:02 AM  Patient Name: Alexus Harris                     YOB: 1955  Diagnosis: History of small bowel obstruction [Z87.19]  Diverticulosis [K57.90]  Nausea and vomiting, unspecified vomiting type [R11.2]  Abdominal bloating [R14.0]  Generalized abdominal pain [R10.84]  S/P repair of ventral hernia [Z98.890, Z87.19]                         Patient Admission Status: Observation   Readmission Risk (Low < 19, Mod (19-27), High > 27): Readmission Risk Score: 9.4    Current PCP: Paige Smith MD  Last Visit: Within last 3 months    Chart Reviewed: Yes      History Provided by: Patient  Patient Orientation: Alert and Oriented    Patient Cognition: Alert    Hospitalization in the last 30 days (Readmission):  No           Advance Directives:      Code Status: Full Code   Patient's Primary Decision Maker is:      Primary Decision Maker: Clinton Harris - Spouse - [phone]    Secondary Decision Maker: dottie Brooke - Child - [phone]    Discharge Planning:    Patient lives with: Spouse/Significant Other   Type of Home: House  Primary Care Giver: Self  Patient Support Systems include: Spouse/Significant Other, Children   Current Financial resources: Medicare  Current community resources: None  Current services prior to admission: None            Current DME:  (freestyle dilcia sensor)            Type of Home Care services:  None    ADLS  Prior functional level: Independent in ADLs/IADLs  Current functional level: Independent in ADLs/IADLs    Family can provide assistance at DC: Yes  Would you like Case Management to discuss the discharge plan with any other family members/significant others, and if so, who? No  Plans to

## 2024-08-16 NOTE — DISCHARGE INSTRUCTIONS
Norco for pain  May shower and get incisions wet.  May remove dressings .  Reg diet  No lifting more than 15 lbs  Follow up with NANCY Peralta for postop visit

## 2024-08-16 NOTE — CONSULTS
Diminished Bilaterally, and Respirations even and unlabored  Cardiac: Regular Rate and Rhythm and Pedal Pulses Palpable Bilaterally  GI/Abdomen:  soft, bowel sounds hypoactive, incisional tenderness with palpation, no guarding or rebound  : Not examined, vega present  Extremities/Musculoskeletal: All four extremities without edema and Generalized weakness  Skin: No Cyanosis, Skin warm and dry, and Incisions with dressing C/D/I  Neuro: Alert and Oriented, to Person, to Place, to Situation, and No Acute Localizing Signs/Symptoms  Psychiatric: Normal mood and affect      Assessment:    Principal Problem:    S/P repair of ventral hernia  Resolved Problems:    * No resolved hospital problems. *    Patient gvva-ewjvkenttq-mfgvhxfx-available records reviewed, including, but not limited to, OR reports--labs--imaging----office records----personal notes.    CASEY RENDON  69 W[address]   [NOHEMI Todd---[place];  ilya Tenorio, ]  FULL CODE    Anti-infectives:    Ancef IV    POD ____ LRA exploration--WHITNEY--ventral incisional hernia repiar---8.15.2024--Jona  Right-sided abdominal pain----bloating---adhesions---8.15.2024    SBO---[facility]--2.7.2024--2.10.2024  Diverticulitis--sigmoid colectomy--10.24.2001            CT AP---2.28.2024--diffuse hepatic steatosis---mild hepatomegaly---                       SBO mid-abdomen--bilateral renal cortical and parapelvic cysts---                       medium-sized HH    HTN  Hyperlipidemia   Diabetes Mellitus Type 2  Morbid obesity   GERD---HH    PMH:   bilateral bone spurs, sigmoid diverticulitis, B9 left breast lump, hypomagnesemia   PSH:   see above, colonoscopy---2019, appendectomy, C3-C5 cervical fusion,               cholecystectomy--2019, colonoscopy--2003--diverticulitis, bilateral bone               spurs--feet,  vaginal SIDDHARTHA---WHITNEY---cervix removed,  separate BSO--1990, left breast              lumpectomy--B9    Allergies:        sulfa---Bactrim DS--hives  Intolerances:   Flagyl--metronidazole---nausea-vomiting      Physical Exam:  Vitals:    08/16/24 1134 08/16/24 1145 08/16/24 1228 08/16/24 1311   BP:  136/66  122/73   Pulse:  88     Resp:  16 16   Temp:  97.8 °F (36.6 °C)     TempSrc:  Temporal     SpO2: 92% 91%     Weight:       Height:   1.562 m (5' 1.5\")        Attending Supervising Physician's Attestation Statement  I performed a history and physical examination on the patient and discussed the management with the nurse practitioner. I reviewed and agree with the findings and plan as documented in her note .    Electronically signed by Rivera Mix MD on 8/16/24 at 1:27 PM EDT           Electronically signed by Rivera Mix MD on 8/16/24 at 1:26 PM EDT         Plan:  DMII -- insulin, monitor and titrate prn, carb controlled diet when diet resumed by surgery  Hypertension -- controlled -- IV lopressor prn until able to resume home medications  RT protocols, IS, wean oxygen off as able  Post-op CBC, CMP, and Mg ordered  Home medications were held per attending --> IV pepcid, IV lopressor prn, resume home medications when okay with surgery  DVT prophylaxis -- SCDs per surgery  7.   Discontinue vega when okay with surgery   8.   See orders    Note that over 45 minutes was spent in reviewing and obtaining history, physical examination and evaluation of the patient, placing orders, providing education, coordinating care, reviewing test results, documenting in the medical record, and discussion/communicating with patient/caregiver/family.    Electronically signed by MARYSE Díaz - CNP, NP-C, FNP-BC on 8/15/2024 at 8:04 PM  Hospitalist/Nocturnist

## 2024-08-16 NOTE — PROGRESS NOTES
2.46 2.65 2.84 3.04 3.24 3.45 58 - 61 2.32 2.54 2.76 2.99 3.23 3.47 3.72 3.98   62 - 65 1.99 2.17 2.35 2.54 2.73 2.93 3.13 3.34 62 - 65 2.19 2.40 2.62 2.85 3.09 3.33 3.58 3.84   66 - 69 1.88 2.05 2.23 2.42 2.61 2.81 3.02 3.23 66 - 69 2.04 2.26 2.48 2.71 2.95 3.19 3.44 3.70   70+ 1.82 1.99 2.17 2.36 2.55 2.75 2.95 3.16 70+ 1.97 2.19 2.41 2.64 2.87 3.12 3.37 3.62             Predicted Peak Expiratory Flow Rate                                       Height (in)  Female       Height (in) Male           Age 56 58 60 62 64 66 68 70 Age            20 344 357 372 387 [phone] 446  60 62 64 66 68 70 72 74 76   25 337 352 366 381 396 411 426 441 25 447 476 505 533 562 [phone]   30 329 344 359 374 389 404 419 434 30 437 466 494 523 552 [phone] 667   35 322 337 351 366 381 396 411 426 35 426 455 484 512 541 [phone]   40 314 329 344 359 374 389 404 419 40 416 445 473 [phone] 647   45 307 322 336 351 366 381 396 411 45 405 434 463 491 520 [phone] 636   50 299 314 329 344 359 374 389 404 50 395 424 452 481 510 [phone] 625   55 292 307 321 336 351 366 381 396 55 384 413 442 470 499 [phone] 615   60 284 299 314 329 344 359 374 389 60 374 403 431 460 489 517 546 575 605   65 277 292 306 321 336 351 366 381 65 363 392 421 449 478 507 535 564 594   70 269 284 299 314 329 344 359 374 70 353 382 410 439 468 [phone] 583   75 261 274 289 305 319 334 348 364 75 344 372 400 429 458 487 515 544 573   80 253 266 282 296 312 327 342 356 80 335 362 390 419 448 [phone]

## 2024-08-16 NOTE — PROGRESS NOTES
Comprehensive Nutrition Assessment    Type and Reason for Visit:  Initial, Positive Nutrition Screen    Nutrition Recommendations/Plan:   Advance diet as tolerated per physician instruction.  Continue appropriate ONS TID.     Malnutrition Assessment:  Malnutrition Status:  At risk for malnutrition (Comment) (r/t  BMI > 25 and presence of chronic disease) (08/16/24 1254)    Context:  Acute Illness     Findings of the 6 clinical characteristics of malnutrition:  Energy Intake:  Mild decrease in energy intake (Comment)  Weight Loss:  No significant weight loss     Body Fat Loss:  Unable to assess     Muscle Mass Loss:  Unable to assess    Fluid Accumulation:  No significant fluid accumulation     Strength:  Not Performed    Nutrition Assessment:    Pt is POD #1 ventral hernia repair and lysis of adhesions in abdomen and small bowel. She is day #2 NPO ->CL. Pt states that she does have a good appetite but that she has to avoid many foods d/t her bowel issues overthe past 40 years. She has been ordered Ensure Clear and writer encouraged consumption. Pt agreed. Pt is also ordered a CHO restriction d/t DM. Pt has not had a significant recent weight loss though has had a gradual weiht loss x 4 years after reaching a high of 206# in 2020. She is obese class I per BMI. Pt has no edema reported. Active bowel sounds with no BM recorded.    Nutrition Related Findings:    Meds/labs reviewed. Jardiance, Pepcid, Insulin included. IV .45% NaCl with KCl  @ 100 ml/hr. glu 156-242 past 24 hrs. Wound Type: Surgical Incision       Current Nutrition Intake & Therapies:    Average Meal Intake: 51-75%  Average Supplements Intake: Unable to assess  ADULT DIET; Clear Liquid; 4 carb choices (60 gm/meal)    Anthropometric Measures:  Height: 156.2 cm (5' 1.5\")  Ideal Body Weight (IBW): 108 lbs (49 kg)    Admission Body Weight: 81.3 kg (179 lb 3.2 oz)  Current Body Weight: 81.7 kg (180 lb 1.9 oz), 166.8 % IBW. Weight Source: Bed  Scale  Current BMI (kg/m2): 33.5  Usual Body Weight:  (UTD; decreasing)     Weight Adjustment For: No Adjustment                 BMI Categories: Obese Class 1 (BMI 30.0-34.9)    Estimated Daily Nutrient Needs:  Energy Requirements Based On: Kcal/kg  Weight Used for Energy Requirements: Current  Energy (kcal/day): 6912-7228  Weight Used for Protein Requirements: Ideal  Protein (g/day): +  Method Used for Fluid Requirements: Darrian Segar Method  Fluid (ml/day): 2400    Nutrition Diagnosis:   Inadequate protein-energy intake related to altered GI structure, acute injury/trauma as evidenced by NPO or clear liquid status due to medical condition    Nutrition Interventions:   Food and/or Nutrient Delivery: Continue Current Diet, Continue Oral Nutrition Supplement  Nutrition Education/Counseling: No recommendation at this time  Coordination of Nutrition Care: Continue to monitor while inpatient  Plan of Care discussed with: Patient    Goals:  Previous Goal Met: Progressing toward Goal(s)  Goals: Meet at least 75% of estimated needs, prior to discharge       Nutrition Monitoring and Evaluation:   Behavioral-Environmental Outcomes: None Identified  Food/Nutrient Intake Outcomes: Diet Advancement/Tolerance, Food and Nutrient Intake, Supplement Intake, IVF Intake  Physical Signs/Symptoms Outcomes: Biochemical Data, GI Status, Nausea or Vomiting, Fluid Status or Edema, Skin, Weight    Discharge Planning:    Too soon to determine     RODNEY DIAZ RD  Contact: [phone]

## 2024-08-17 VITALS
RESPIRATION RATE: 16 BRPM | BODY MASS INDEX: 33.21 KG/M2 | WEIGHT: 180.5 LBS | OXYGEN SATURATION: 91 % | HEART RATE: 87 BPM | HEIGHT: 62 IN | SYSTOLIC BLOOD PRESSURE: 123 MMHG | DIASTOLIC BLOOD PRESSURE: 77 MMHG | TEMPERATURE: 97.8 F

## 2024-08-17 PROBLEM — G89.18 POST-OPERATIVE PAIN: Status: ACTIVE | Noted: 2024-08-17

## 2024-08-17 LAB
ANION GAP SERPL CALCULATED.3IONS-SCNC: 11 MMOL/L (ref 9–17)
BASOPHILS # BLD: 0.06 K/UL (ref 0–0.2)
BASOPHILS NFR BLD: 0 % (ref 0–2)
BUN SERPL-MCNC: 10 MG/DL (ref 8–23)
BUN/CREAT SERPL: 17 (ref 9–20)
CALCIUM SERPL-MCNC: 8.8 MG/DL (ref 8.6–10.4)
CHLORIDE SERPL-SCNC: 109 MMOL/L (ref 98–107)
CO2 SERPL-SCNC: 21 MMOL/L (ref 20–31)
CREAT SERPL-MCNC: 0.6 MG/DL (ref 0.5–0.9)
EOSINOPHIL # BLD: <0.03 K/UL (ref 0–0.44)
EOSINOPHILS RELATIVE PERCENT: 0 % (ref 1–4)
ERYTHROCYTE [DISTWIDTH] IN BLOOD BY AUTOMATED COUNT: 14 % (ref 11.8–14.4)
GFR, ESTIMATED: >90 ML/MIN/1.73M2
GLUCOSE BLD-MCNC: 139 MG/DL (ref 65–105)
GLUCOSE SERPL-MCNC: 157 MG/DL (ref 70–99)
HCT VFR BLD AUTO: 40.6 % (ref 36.3–47.1)
HGB BLD-MCNC: 13.2 G/DL (ref 11.9–15.1)
IMM GRANULOCYTES # BLD AUTO: 0.12 K/UL (ref 0–0.3)
IMM GRANULOCYTES NFR BLD: 1 %
LYMPHOCYTES NFR BLD: 1.7 K/UL (ref 1.1–3.7)
LYMPHOCYTES RELATIVE PERCENT: 11 % (ref 24–43)
MAGNESIUM SERPL-MCNC: 1.9 MG/DL (ref 1.6–2.6)
MCH RBC QN AUTO: 27.6 PG (ref 25.2–33.5)
MCHC RBC AUTO-ENTMCNC: 32.5 G/DL (ref 25.2–33.5)
MCV RBC AUTO: 84.9 FL (ref 82.6–102.9)
MONOCYTES NFR BLD: 1.05 K/UL (ref 0.1–1.2)
MONOCYTES NFR BLD: 7 % (ref 3–12)
NEUTROPHILS NFR BLD: 82 % (ref 36–65)
NEUTS SEG NFR BLD: 13.31 K/UL (ref 1.5–8.1)
NRBC BLD-RTO: 0 PER 100 WBC
PLATELET # BLD AUTO: 323 K/UL (ref 138–453)
PMV BLD AUTO: 10.2 FL (ref 8.1–13.5)
POTASSIUM SERPL-SCNC: 4.2 MMOL/L (ref 3.7–5.3)
RBC # BLD AUTO: 4.78 M/UL (ref 3.95–5.11)
SODIUM SERPL-SCNC: 141 MMOL/L (ref 135–144)
WBC OTHER # BLD: 16.3 K/UL (ref 3.5–11.3)

## 2024-08-17 PROCEDURE — 80048 BASIC METABOLIC PNL TOTAL CA: CPT

## 2024-08-17 PROCEDURE — 6370000000 HC RX 637 (ALT 250 FOR IP): Performed by: INTERNAL MEDICINE

## 2024-08-17 PROCEDURE — 2500000003 HC RX 250 WO HCPCS: Performed by: NURSE PRACTITIONER

## 2024-08-17 PROCEDURE — 6360000002 HC RX W HCPCS: Performed by: NURSE PRACTITIONER

## 2024-08-17 PROCEDURE — G0378 HOSPITAL OBSERVATION PER HR: HCPCS

## 2024-08-17 PROCEDURE — 6360000002 HC RX W HCPCS: Performed by: SURGERY

## 2024-08-17 PROCEDURE — 99232 SBSQ HOSP IP/OBS MODERATE 35: CPT | Performed by: FAMILY MEDICINE

## 2024-08-17 PROCEDURE — 2580000003 HC RX 258: Performed by: NURSE PRACTITIONER

## 2024-08-17 PROCEDURE — 82947 ASSAY GLUCOSE BLOOD QUANT: CPT

## 2024-08-17 PROCEDURE — 6370000000 HC RX 637 (ALT 250 FOR IP): Performed by: SURGERY

## 2024-08-17 PROCEDURE — 83735 ASSAY OF MAGNESIUM: CPT

## 2024-08-17 PROCEDURE — 2580000003 HC RX 258: Performed by: SURGERY

## 2024-08-17 PROCEDURE — 96376 TX/PRO/DX INJ SAME DRUG ADON: CPT

## 2024-08-17 PROCEDURE — 36415 COLL VENOUS BLD VENIPUNCTURE: CPT

## 2024-08-17 PROCEDURE — 85025 COMPLETE CBC W/AUTO DIFF WBC: CPT

## 2024-08-17 RX ORDER — HYDROCODONE BITARTRATE AND ACETAMINOPHEN 5; 325 MG/1; MG/1
1 TABLET ORAL EVERY 6 HOURS PRN
Qty: 28 TABLET | Refills: 0 | Status: SHIPPED | OUTPATIENT
Start: 2024-08-17 | End: 2024-08-24

## 2024-08-17 RX ADMIN — METOPROLOL SUCCINATE 12.5 MG: 25 TABLET, EXTENDED RELEASE ORAL at 09:24

## 2024-08-17 RX ADMIN — DIPHENHYDRAMINE HYDROCHLORIDE 25 MG: 50 INJECTION INTRAMUSCULAR; INTRAVENOUS at 06:01

## 2024-08-17 RX ADMIN — SODIUM CHLORIDE, PRESERVATIVE FREE 20 MG: 5 INJECTION INTRAVENOUS at 09:24

## 2024-08-17 RX ADMIN — POTASSIUM CHLORIDE AND SODIUM CHLORIDE 100 ML/HR: 450; 150 INJECTION, SOLUTION INTRAVENOUS at 00:52

## 2024-08-17 RX ADMIN — OXYCODONE HYDROCHLORIDE 5 MG: 5 TABLET ORAL at 06:00

## 2024-08-17 RX ADMIN — AMLODIPINE BESYLATE 10 MG: 5 TABLET ORAL at 09:24

## 2024-08-17 RX ADMIN — DAPAGLIFLOZIN 10 MG: 10 TABLET, FILM COATED ORAL at 09:27

## 2024-08-17 RX ADMIN — SODIUM CHLORIDE, PRESERVATIVE FREE 10 ML: 5 INJECTION INTRAVENOUS at 09:26

## 2024-08-17 RX ADMIN — OXYCODONE HYDROCHLORIDE 5 MG: 5 TABLET ORAL at 13:07

## 2024-08-17 ASSESSMENT — PAIN DESCRIPTION - LOCATION
LOCATION: ABDOMEN
LOCATION: ABDOMEN

## 2024-08-17 ASSESSMENT — PAIN SCALES - GENERAL
PAINLEVEL_OUTOF10: 9
PAINLEVEL_OUTOF10: 8
PAINLEVEL_OUTOF10: 0

## 2024-08-17 ASSESSMENT — PAIN DESCRIPTION - ORIENTATION: ORIENTATION: UPPER;RIGHT;LEFT

## 2024-08-17 ASSESSMENT — PAIN DESCRIPTION - DESCRIPTORS
DESCRIPTORS: ACHING;DISCOMFORT
DESCRIPTORS: DISCOMFORT

## 2024-08-17 ASSESSMENT — PAIN - FUNCTIONAL ASSESSMENT
PAIN_FUNCTIONAL_ASSESSMENT: ACTIVITIES ARE NOT PREVENTED
PAIN_FUNCTIONAL_ASSESSMENT: ACTIVITIES ARE NOT PREVENTED

## 2024-08-17 NOTE — PROGRESS NOTES
Discharge instructions reviewed with patient. Instructed on follow up with NANCY Herrera and the office will call to schedule appointment. Medications reviewed and instructed on next doses due. Information provided on new medication and post op hernia repair. Instructed on site care, lifting restrictions, and follow up per Dr. Tenorio. Denies any further questions before discharge.

## 2024-08-17 NOTE — PROGRESS NOTES
Hospitalist Progress Note  8/17/2024 2:12 PM  Subjective:   Admit Date: 8/15/2024  PCP: Paige Smith MD     Full Code      C/c:No chief complaint on file.        Interval History: doing well, no complaints pt being d/c from surgery    Diet: ADULT DIET; Regular                                ip days:0  Medications:   Scheduled Meds:   amLODIPine  10 mg Oral Daily    metoprolol succinate  12.5 mg Oral Daily    dapagliflozin  10 mg Oral QAM    insulin glargine  10 Units SubCUTAneous Nightly    insulin lispro  0-4 Units SubCUTAneous 4x Daily AC & HS    sodium chloride flush  5-40 mL IntraVENous 2 times per day    famotidine (PEPCID) injection  20 mg IntraVENous BID     Continuous Infusions:   sodium chloride      0.45 % NaCl with KCl 20 mEq Stopped (08/17/24 0933)    dextrose       PRN Meds:.ondansetron, sodium chloride flush, sodium chloride, oxyCODONE, HYDROmorphone **OR** HYDROmorphone, magnesium hydroxide, glucose, dextrose bolus **OR** dextrose bolus, glucagon (rDNA), dextrose, albuterol, metoprolol, diphenhydrAMINE     CBC:   Recent Labs     08/15/24  1945 08/16/24  0516 08/17/24  0549   WBC 17.1* 10.6 16.3*   HGB 12.6 12.5 13.2    288 323     BMP:    Recent Labs     08/15/24  1945 08/16/24  0516 08/17/24  0549    140 141   K 3.8 4.1 4.2   * 108* 109*   CO2 21 20 21   BUN 11 10 10   CREATININE 0.7 0.5 0.6   GLUCOSE 233* 159* 157*     Hepatic:   Recent Labs     08/15/24  1945   AST 24   ALT 19   BILITOT 0.2*   ALKPHOS 91     Troponin: No results for input(s): \"TROPONINI\" in the last 72 hours.  BNP: No results for input(s): \"BNP\" in the last 72 hours.  Lipids: No results for input(s): \"CHOL\", \"HDL\" in the last 72 hours.    Invalid input(s): \"LDLCALCU\"  INR: No results for input(s): \"INR\" in the last 72 hours.    Objective:   Vitals: /77   Pulse 87   Temp 97.8 °F (36.6 °C) (Temporal)   Resp 16   Ht 1.562 m (5' 1.5\")   Wt 81.9 kg (180 lb 8 oz)   SpO2 91%   BMI 33.55 kg/m²   General

## 2024-08-18 ENCOUNTER — FOLLOWUP TELEPHONE ENCOUNTER (OUTPATIENT)
Dept: INPATIENT UNIT | Age: 69
End: 2024-08-18

## 2024-08-19 NOTE — DISCHARGE SUMMARY
equation is less accurate in patients with extremes of muscle mass, extra-renal   metabolism of creatine, excessive creatine ingestion, or following therapy that affects   renal tubular secretion.    BUN/Creatinine Ratio                          Date: 08/15/2024  Value: 16          Ref range: 9 - 20             Status: Final  Calcium                                       Date: 08/15/2024  Value: 9.1         Ref range: 8.6 - 10.4 mg/dL   Status: Final  Total Protein                                 Date: 08/15/2024  Value: 6.7         Ref range: 6.4 - 8.3 g/dL     Status: Final  Albumin                                       Date: 08/15/2024  Value: 3.7         Ref range: 3.5 - 5.2 g/dL     Status: Final  Albumin/Globulin Ratio                        Date: 08/15/2024  Value: 1.2         Ref range: 1.0 - 2.5          Status: Final  Total Bilirubin                               Date: 08/15/2024  Value: 0.2 (L)     Ref range: 0.3 - 1.2 mg/dL    Status: Final  Alkaline Phosphatase                          Date: 08/15/2024  Value: 91          Ref range: 35 - 104 U/L       Status: Final  ALT                                           Date: 08/15/2024  Value: 19          Ref range: 5 - 33 U/L         Status: Final  AST                                           Date: 08/15/2024  Value: 24          Ref range: <32 U/L            Status: Final  Magnesium                                     Date: 08/15/2024  Value: 1.8         Ref range: 1.6 - 2.6 mg/dL    Status: Final  POC Glucose                                   Date: 08/15/2024  Value: 187 (H)     Ref range: 65 - 105 mg/dL     Status: Final  POC Glucose                                   Date: 08/16/2024  Value: 156 (H)     Ref range: 65 - 105 mg/dL     Status: Final  POC Glucose                                   Date: 08/16/2024  Value: 242 (H)     Ref range: 65 - 105 mg/dL     Status: Final  POC Glucose                                   Date: 08/16/2024  Value: 115 (H)

## 2024-08-20 ENCOUNTER — TELEPHONE (OUTPATIENT)
Dept: FAMILY MEDICINE CLINIC | Age: 69
End: 2024-08-20

## 2024-09-04 ENCOUNTER — OFFICE VISIT (OUTPATIENT)
Dept: SURGERY | Age: 69
End: 2024-09-04
Payer: MEDICARE

## 2024-09-04 VITALS
RESPIRATION RATE: 18 BRPM | WEIGHT: 171.4 LBS | HEIGHT: 62 IN | HEART RATE: 64 BPM | BODY MASS INDEX: 31.54 KG/M2 | DIASTOLIC BLOOD PRESSURE: 60 MMHG | TEMPERATURE: 96.3 F | SYSTOLIC BLOOD PRESSURE: 116 MMHG

## 2024-09-04 DIAGNOSIS — G89.18 POST-OP PAIN: Primary | ICD-10-CM

## 2024-09-04 PROCEDURE — 3017F COLORECTAL CA SCREEN DOC REV: CPT | Performed by: SURGERY

## 2024-09-04 PROCEDURE — G8417 CALC BMI ABV UP PARAM F/U: HCPCS | Performed by: SURGERY

## 2024-09-04 PROCEDURE — 1123F ACP DISCUSS/DSCN MKR DOCD: CPT | Performed by: SURGERY

## 2024-09-04 PROCEDURE — 99213 OFFICE O/P EST LOW 20 MIN: CPT | Performed by: SURGERY

## 2024-09-04 PROCEDURE — 3074F SYST BP LT 130 MM HG: CPT | Performed by: SURGERY

## 2024-09-04 PROCEDURE — 3078F DIAST BP <80 MM HG: CPT | Performed by: SURGERY

## 2024-09-04 PROCEDURE — 1090F PRES/ABSN URINE INCON ASSESS: CPT | Performed by: SURGERY

## 2024-09-04 PROCEDURE — G8400 PT W/DXA NO RESULTS DOC: HCPCS | Performed by: SURGERY

## 2024-09-04 PROCEDURE — G8427 DOCREV CUR MEDS BY ELIG CLIN: HCPCS | Performed by: SURGERY

## 2024-09-04 PROCEDURE — 1036F TOBACCO NON-USER: CPT | Performed by: SURGERY

## 2024-09-04 RX ORDER — HYDROCODONE BITARTRATE AND ACETAMINOPHEN 5; 325 MG/1; MG/1
1 TABLET ORAL EVERY 6 HOURS PRN
Qty: 20 TABLET | Refills: 0 | Status: SHIPPED | OUTPATIENT
Start: 2024-09-04 | End: 2024-09-09

## 2024-09-04 NOTE — PROGRESS NOTES
Patient here for 2 wk post op laparoscopic robotic assisted exploration with lysis of adhesions, ventral incisional hernia repair on 8/15/2024 with Dr. Tenorio.  Patient reports almost went to the ER post op as she has been vomiting every other day as she didn't have an appetite and taking pain medication.  Patient is out of pain medication and would like a few more if possible.    She reports still having pain, rating pain 5/10 intermittently in the mid abdomen and reports pain is improving day by day. Denies any more nausea or vomiting, reports appetite is increasing. Bowels are loose.

## 2024-09-04 NOTE — PROGRESS NOTES
Patient here for 2 wk post op laparoscopic robotic assisted exploration with lysis of adhesions, ventral incisional hernia repair on 8/15/2024 with Dr. Tenorio.  Patient reports almost went to the ER post op as she has been vomiting every other day as she didn't have an appetite and taking pain medication.  Patient is out of pain medication and would like a few more if possible.     She reports still having pain, rating pain 5/10 intermittently in the mid abdomen and reports pain is improving day by day. Denies any more nausea or vomiting, reports appetite is increasing. Bowels are loose.      Abd- soft + bs sone tenderness midline mid abd where hernia was repaired and mesh was placed.    Doing fairly good  Continue to increase activity slowly.  Will give one more script for pain meds.  Norco for 20 tabs  Will follow up one more time in 1 month to make sure no nausea or emesis or abd pain.    Total time with pt was 20 min

## 2024-10-02 ENCOUNTER — OFFICE VISIT (OUTPATIENT)
Dept: SURGERY | Age: 69
End: 2024-10-02
Payer: MEDICARE

## 2024-10-02 VITALS
OXYGEN SATURATION: 96 % | HEIGHT: 62 IN | TEMPERATURE: 98.8 F | DIASTOLIC BLOOD PRESSURE: 70 MMHG | WEIGHT: 175 LBS | SYSTOLIC BLOOD PRESSURE: 130 MMHG | BODY MASS INDEX: 32.2 KG/M2 | HEART RATE: 88 BPM

## 2024-10-02 DIAGNOSIS — Z09 POSTOP CHECK: Primary | ICD-10-CM

## 2024-10-02 PROCEDURE — 99213 OFFICE O/P EST LOW 20 MIN: CPT | Performed by: SURGERY

## 2024-10-02 NOTE — PROGRESS NOTES
Patient is here for post Laparoscopic Robotic Exploration with lysis of adhesion with ventral incisional hernia repair on 8-15-24. She reports still having pain, rating it 3 out of 10 that intermittently in mid upper abdomen. Having RUQ pain rates it 6 out 10 several times a day. Denies any nausea or vomiting Bowel movement are formed. Patient states that she is eating, sleeping and activity as normal.     Overall she is doing much better.  Eating well not having much nausea.  Pain is getting better.  She must of had a partial small bowel obstruction that Intermittently getting more obstructed.  We took the adhesions down in the bowel kink as well as fix the hernia and she is done much better.  At this point we will see her on a as needed basis      Total time with pt is 20 min

## 2024-10-02 NOTE — PROGRESS NOTES
Patient is here for post Laparoscopic Robotic Exploration with lysis of adhesion with ventral incisional hernia repair on 8-15-24. She reports still having pain, rating it 3 out of 10 that intermittently in mid upper abdomen. Having RUQ pain rates it 6 out 10 several times a day. Denies any nausea or vomiting Bowel movement are formed. Patient states that she is eating, sleeping and activity as normal.

## 2024-10-07 DIAGNOSIS — E11.8 TYPE 2 DIABETES MELLITUS WITH COMPLICATION, WITHOUT LONG-TERM CURRENT USE OF INSULIN (HCC): ICD-10-CM

## 2024-10-07 RX ORDER — DAPAGLIFLOZIN 10 MG/1
10 TABLET, FILM COATED ORAL EVERY MORNING
Qty: 90 TABLET | Refills: 3 | Status: SHIPPED | OUTPATIENT
Start: 2024-10-07 | End: 2024-10-09 | Stop reason: SDUPTHER

## 2024-10-07 NOTE — TELEPHONE ENCOUNTER
Alexus Harris is calling to request a refill on the following medication(s):  Requested Prescriptions     Pending Prescriptions Disp Refills    FARXIGA 10 MG tablet 90 tablet 3     Sig: Take 1 tablet by mouth every morning       Last Visit Date (If Applicable):  7/22/2024    Next Visit Date:    10/24/2024

## 2024-10-09 DIAGNOSIS — E11.8 TYPE 2 DIABETES MELLITUS WITH COMPLICATION, WITHOUT LONG-TERM CURRENT USE OF INSULIN (HCC): ICD-10-CM

## 2024-10-09 RX ORDER — DAPAGLIFLOZIN 10 MG/1
10 TABLET, FILM COATED ORAL EVERY MORNING
Qty: 90 TABLET | Refills: 3 | Status: SHIPPED | OUTPATIENT
Start: 2024-10-09

## 2024-10-09 NOTE — TELEPHONE ENCOUNTER
Alexus Harris is requesting a refill on the following medication(s):  Requested Prescriptions     Pending Prescriptions Disp Refills    FARXIGA 10 MG tablet 90 tablet 3     Sig: Take 1 tablet by mouth every morning       Last Visit Date (If Applicable):  7/22/2024    Next Visit Date:    10/24/2024      Patient [place] new rx sent to [place] [facility]- can be sent electronically through [employer]

## 2024-10-24 ENCOUNTER — OFFICE VISIT (OUTPATIENT)
Dept: FAMILY MEDICINE CLINIC | Age: 69
End: 2024-10-24

## 2024-10-24 VITALS
HEART RATE: 84 BPM | DIASTOLIC BLOOD PRESSURE: 74 MMHG | BODY MASS INDEX: 32.53 KG/M2 | SYSTOLIC BLOOD PRESSURE: 126 MMHG | WEIGHT: 175 LBS | OXYGEN SATURATION: 95 %

## 2024-10-24 DIAGNOSIS — Z23 NEEDS FLU SHOT: ICD-10-CM

## 2024-10-24 DIAGNOSIS — B37.31 YEAST VAGINITIS: ICD-10-CM

## 2024-10-24 DIAGNOSIS — E11.8 TYPE 2 DIABETES MELLITUS WITH COMPLICATION, WITHOUT LONG-TERM CURRENT USE OF INSULIN (HCC): Primary | ICD-10-CM

## 2024-10-24 LAB
ALBUMIN/GLOBULIN RATIO: 1.5 G/DL
ALBUMIN: 4.7 G/DL (ref 3.5–5)
ALP BLD-CCNC: 88 UNITS/L (ref 38–126)
ALT SERPL-CCNC: 20 UNITS/L (ref 4–35)
ANION GAP SERPL CALCULATED.3IONS-SCNC: 9.7 MMOL/L (ref 3–11)
AST SERPL-CCNC: 21 UNITS/L (ref 14–36)
BASOPHILS ABSOLUTE: 0.15 X10E3/?L (ref 0–0.3)
BASOPHILS RELATIVE PERCENT: 1.16 % (ref 0–3)
BILIRUB SERPL-MCNC: 0.5 MG/DL (ref 0.2–1.3)
BUN BLDV-MCNC: 11 MG/DL (ref 7–17)
CALCIUM SERPL-MCNC: 10 MG/DL (ref 8.4–10.2)
CHLORIDE BLD-SCNC: 107 MMOL/L (ref 98–120)
CHOLESTEROL, TOTAL: 146 MG/DL (ref 50–200)
CHOLESTEROL/HDL RATIO: 2 RATIO (ref 0–4.5)
CO2: 22 MMOL/L (ref 22–31)
CREAT SERPL-MCNC: 0.8 MG/DL (ref 0.5–1)
EOSINOPHILS ABSOLUTE: 0.55 X10E3/?L (ref 0–1.1)
EOSINOPHILS RELATIVE PERCENT: 4.15 % (ref 0–10)
GFR, ESTIMATED: > 60
GLOBULIN: 3 G/DL
GLUCOSE: 150 MG/DL (ref 65–105)
HBA1C MFR BLD: 6.9 %
HCT VFR BLD CALC: 48 % (ref 37–47)
HDLC SERPL-MCNC: 60 MG/DL (ref 36–68)
HEMOGLOBIN: 15.1 G/DL (ref 12–16)
LDL CHOLESTEROL: 47 MG/DL (ref 0–160)
LYMPHOCYTES ABSOLUTE: 2.58 X10E3/?L (ref 1–5.5)
LYMPHOCYTES RELATIVE PERCENT: 19.37 % (ref 20–51.1)
MAGNESIUM: 2 MG/DL (ref 1.6–2.3)
MCH RBC QN AUTO: 27.2 PG (ref 28.5–32.5)
MCHC RBC AUTO-ENTMCNC: 31.5 G/DL (ref 32–37)
MCV RBC AUTO: 86.4 FL (ref 80–94)
MONOCYTES ABSOLUTE: 0.72 X10E3/?L (ref 0.1–1)
MONOCYTES RELATIVE PERCENT: 5.4 % (ref 1.7–9.3)
NEUTROPHILS ABSOLUTE: 9.33 X10E3/?L (ref 2–8.1)
NEUTROPHILS RELATIVE PERCENT: 69.92 % (ref 42.2–75.2)
PDW BLD-RTO: 13.3 % (ref 8.5–15.5)
PLATELET # BLD: 365.4 THOU/MM3 (ref 130–400)
POTASSIUM SERPL-SCNC: 4.4 MMOL/L (ref 3.6–5)
RBC # BLD: 5.56 M/UL (ref 4.2–5.4)
SODIUM BLD-SCNC: 139 MMOL/L (ref 135–145)
TOTAL PROTEIN: 7.7 G/DL (ref 6.3–8.2)
TRIGL SERPL-MCNC: 195 MG/DL (ref 10–250)
VITAMIN B-12: 688 PG/ML (ref 239–931)
VLDLC SERPL CALC-MCNC: 39 MG/DL (ref 0–50)
WBC # BLD: 13.4 THOU/ML3 (ref 4.8–10.8)

## 2024-10-24 RX ORDER — FLUCONAZOLE 150 MG/1
TABLET ORAL
Qty: 10 TABLET | Refills: 1 | Status: SHIPPED | OUTPATIENT
Start: 2024-10-24 | End: 2024-10-27

## 2024-10-24 NOTE — PROGRESS NOTES
Have you had an allergic reaction to the flu (influenza) shot? no  Are you allergic to eggs or any component of the flu vaccine? no  Do you have a history of Guillain-[place] Syndrome (GBS), which is paralysis after receiving the flu vaccine? no  Are you feeling well today? yes  Flu vaccine given as ordered.  Patient tolerated it well.  No questions re: VIS information.    After obtaining consent, and per orders of Paige Smith MD, injection of FLU VACCINE given in Left deltoid by Mindi Means MA. Patient tolerated well.  Patient instructed to remain in clinic for 20 minutes afterwards, and to report any adverse reaction immediately.   
Diabetic Alb to Cr ratio (uACR) test  11/15/2022    Breast cancer screen  05/14/2024    COVID-19 Vaccine (5 - 2023-24 season) 09/01/2024    Annual Wellness Visit (Medicare)  10/19/2024    Depression Screen  01/22/2025    Diabetic foot exam  10/24/2025    A1C test (Diabetic or Prediabetic)  10/24/2025    Lipids  10/24/2025    GFR test (Diabetes, CKD 3-4, OR last GFR 15-59)  10/24/2025    Colorectal Cancer Screen  10/18/2029    DEXA (modify frequency per FRAX score)  Completed    Flu vaccine  Completed    Pneumococcal 65+ years Vaccine  Completed    Hepatitis C screen  Completed    Hepatitis A vaccine  Aged Out    Hepatitis B vaccine  Aged Out    Hib vaccine  Aged Out    Polio vaccine  Aged Out    Meningococcal (ACWY) vaccine  Aged Out         Review of Systems    Objective:     /74 (Site: Left Upper Arm, Position: Sitting, Cuff Size: Medium Adult)   Pulse 84   Wt 79.4 kg (175 lb)   SpO2 95%   BMI 32.53 kg/m²     Physical Exam  Lungs sound good.    Physical Exam  Vitals and nursing note reviewed.   Constitutional:       Appearance: Normal appearance. She is well-developed.   HENT:      Head: Normocephalic and atraumatic.      Right Ear: External ear normal.      Left Ear: External ear normal.   Eyes:      Extraocular Movements: Extraocular movements intact.      Conjunctiva/sclera: Conjunctivae normal.   Neck:      Thyroid: No thyromegaly.      Vascular: No JVD.   Cardiovascular:      Rate and Rhythm: Normal rate and regular rhythm.      Pulses: Normal pulses.      Heart sounds: Normal heart sounds. No murmur heard.     No friction rub. No gallop.   Pulmonary:      Effort: Pulmonary effort is normal. No respiratory distress.      Breath sounds: Normal breath sounds.   Abdominal:      Palpations: Abdomen is soft. There is no mass.      Tenderness: There is no abdominal tenderness.      Comments: Surgical incisions are well healed   Musculoskeletal:      Cervical back: Normal range of motion and neck supple.

## 2024-11-18 ENCOUNTER — TELEPHONE (OUTPATIENT)
Dept: FAMILY MEDICINE CLINIC | Age: 69
End: 2024-11-18

## 2024-11-18 NOTE — TELEPHONE ENCOUNTER
Alexus Harris is requesting a refill on the following medication(s):  Requested Prescriptions     Pending Prescriptions Disp Refills    metFORMIN (GLUCOPHAGE) 500 MG tablet 60 tablet      Sig: Take 1 tablet by mouth 3 times daily       Last Visit Date (If Applicable):  10/24/2024    Next Visit Date:    4/24/2025

## 2024-11-23 DIAGNOSIS — I10 HYPERTENSION, ESSENTIAL: ICD-10-CM

## 2024-11-25 RX ORDER — AMLODIPINE BESYLATE 10 MG/1
TABLET ORAL
Qty: 90 TABLET | Refills: 3 | Status: SHIPPED | OUTPATIENT
Start: 2024-11-25

## 2024-11-25 NOTE — TELEPHONE ENCOUNTER
Alexus Harris is requesting a refill on the following medication(s):  Requested Prescriptions     Pending Prescriptions Disp Refills    amLODIPine (NORVASC) 10 MG tablet [Pharmacy Med Name: amLODIPine Besylate 10 MG Oral Tablet] 90 tablet 0     Sig: Take 1 tablet by mouth once daily       Last Visit Date (If Applicable):  10/24/2024    Next Visit Date:    4/24/2025

## 2024-11-29 DIAGNOSIS — E11.9 DIABETES MELLITUS TYPE 2, INSULIN DEPENDENT (HCC): ICD-10-CM

## 2024-11-29 DIAGNOSIS — Z79.4 DIABETES MELLITUS TYPE 2, INSULIN DEPENDENT (HCC): ICD-10-CM

## 2024-12-02 RX ORDER — LANCING DEVICE
EACH MISCELLANEOUS
Qty: 100 EACH | Refills: 5 | Status: SHIPPED | OUTPATIENT
Start: 2024-12-02

## 2024-12-02 NOTE — TELEPHONE ENCOUNTER
Alexus Harris is requesting a refill on the following medication(s):  Requested Prescriptions     Pending Prescriptions Disp Refills    Insulin Pen Needle (COMFORT EZ PEN NEEDLES) 31G X 8 MM MISC [Pharmacy Med Name: COMFORT EZ [id number]  MIS] 100 each 5     Sig: USE AS DIRECTED ONCE DAILY       Last Visit Date (If Applicable):  10/24/2024    Next Visit Date:    4/24/2025

## 2024-12-06 RX ORDER — INSULIN GLARGINE 300 U/ML
INJECTION, SOLUTION SUBCUTANEOUS
Qty: 6 ML | Refills: 3 | Status: SHIPPED | OUTPATIENT
Start: 2024-12-06

## 2024-12-06 NOTE — TELEPHONE ENCOUNTER
Alexus Harris is calling to request a refill on the following medication(s):  Requested Prescriptions     Pending Prescriptions Disp Refills    Insulin Glargine, 2 Unit Dial, (TOUJEO MAX SOLOSTAR) 300 UNIT/ML concentrated injection pen [Pharmacy Med Name: Toukatlyno Max SoloStar 300 UNIT/ML Subcutaneous Solution Pen-injector] 6 mL 3     Sig: INJECT 12 UNITS SUBCUTANEOUSLY ONCE DAILY    metFORMIN (GLUCOPHAGE) 500 MG tablet 90 tablet 5     Sig: Take 1 tablet by mouth 3 times daily       Last Visit Date (If Applicable):  10/24/2024    Next Visit Date:    4/24/2025

## 2024-12-12 ENCOUNTER — TELEPHONE (OUTPATIENT)
Dept: FAMILY MEDICINE CLINIC | Age: 69
End: 2024-12-12

## 2024-12-21 DIAGNOSIS — E78.2 MIXED HYPERLIPIDEMIA: ICD-10-CM

## 2024-12-23 RX ORDER — ROSUVASTATIN CALCIUM 10 MG/1
10 TABLET, COATED ORAL NIGHTLY
Qty: 90 TABLET | Refills: 3 | Status: SHIPPED | OUTPATIENT
Start: 2024-12-23

## 2024-12-23 NOTE — TELEPHONE ENCOUNTER
Alexus Harris is requesting a refill on the following medication(s):  Requested Prescriptions     Pending Prescriptions Disp Refills    rosuvastatin (CRESTOR) 10 MG tablet [Pharmacy Med Name: Rosuvastatin Calcium 10 MG Oral Tablet] 90 tablet 0     Sig: Take 1 tablet by mouth nightly       Last Visit Date (If Applicable):  10/24/2024    Next Visit Date:    4/24/2025

## 2025-03-17 DIAGNOSIS — E11.8 TYPE 2 DIABETES MELLITUS WITH COMPLICATION, WITHOUT LONG-TERM CURRENT USE OF INSULIN (HCC): ICD-10-CM

## 2025-03-17 DIAGNOSIS — B37.31 YEAST VAGINITIS: ICD-10-CM

## 2025-03-18 NOTE — TELEPHONE ENCOUNTER
Alexus Harris is requesting a refill on the following medication(s):  Requested Prescriptions     Pending Prescriptions Disp Refills    fluconazole (DIFLUCAN) 150 MG tablet [Pharmacy Med Name: Fluconazole 150 MG Oral Tablet] 10 tablet 0     Sig: TAKE 1 TABLET EVERY 3 DAYS AS NEEDED FOR RECURRENT YEAST       Last Visit Date (If Applicable):  10/24/2024    Next Visit Date:    4/24/2025

## 2025-03-19 RX ORDER — FLUCONAZOLE 150 MG/1
TABLET ORAL
Qty: 10 TABLET | Refills: 0 | Status: SHIPPED | OUTPATIENT
Start: 2025-03-19

## 2025-04-24 ENCOUNTER — OFFICE VISIT (OUTPATIENT)
Dept: FAMILY MEDICINE CLINIC | Age: 70
End: 2025-04-24
Payer: MEDICARE

## 2025-04-24 VITALS
OXYGEN SATURATION: 96 % | HEART RATE: 78 BPM | DIASTOLIC BLOOD PRESSURE: 78 MMHG | SYSTOLIC BLOOD PRESSURE: 124 MMHG | WEIGHT: 179 LBS | BODY MASS INDEX: 33.27 KG/M2

## 2025-04-24 DIAGNOSIS — D17.22 LIPOMA OF LEFT UPPER EXTREMITY: ICD-10-CM

## 2025-04-24 DIAGNOSIS — Z51.81 MEDICATION MONITORING ENCOUNTER: ICD-10-CM

## 2025-04-24 DIAGNOSIS — B37.31 YEAST VAGINITIS: ICD-10-CM

## 2025-04-24 DIAGNOSIS — I10 HYPERTENSION, ESSENTIAL: ICD-10-CM

## 2025-04-24 DIAGNOSIS — N30.90 CYSTITIS: ICD-10-CM

## 2025-04-24 DIAGNOSIS — E78.2 MIXED HYPERLIPIDEMIA: ICD-10-CM

## 2025-04-24 DIAGNOSIS — R11.0 NAUSEA: ICD-10-CM

## 2025-04-24 DIAGNOSIS — R35.0 FREQUENT URINATION: ICD-10-CM

## 2025-04-24 DIAGNOSIS — E11.8 TYPE 2 DIABETES MELLITUS WITH COMPLICATION, WITHOUT LONG-TERM CURRENT USE OF INSULIN (HCC): Primary | ICD-10-CM

## 2025-04-24 LAB
BILIRUBIN, POC: NEGATIVE
BLOOD URINE, POC: NORMAL
CLARITY, POC: NORMAL
COLOR, POC: NORMAL
CREATININE, RANDOM URINE: 113.7 MG/DL (ref 20–370)
GLUCOSE URINE, POC: 1000 MG/DL
HBA1C MFR BLD: 7.1 %
KETONES, POC: NORMAL MG/DL
LEUKOCYTE EST, POC: NORMAL
MICROALBUMIN/CREAT 24H UR: 6 MG/DL (ref 0–1.7)
MICROALBUMIN/CREAT UR-RTO: 52.77
NITRITE, POC: NEGATIVE
PH, POC: 6
PROTEIN, POC: 30 MG/DL
SPECIFIC GRAVITY, POC: 1.02
UROBILINOGEN, POC: 0.2 MG/DL

## 2025-04-24 PROCEDURE — 3074F SYST BP LT 130 MM HG: CPT | Performed by: FAMILY MEDICINE

## 2025-04-24 PROCEDURE — 99214 OFFICE O/P EST MOD 30 MIN: CPT | Performed by: FAMILY MEDICINE

## 2025-04-24 PROCEDURE — 1090F PRES/ABSN URINE INCON ASSESS: CPT | Performed by: FAMILY MEDICINE

## 2025-04-24 PROCEDURE — G8427 DOCREV CUR MEDS BY ELIG CLIN: HCPCS | Performed by: FAMILY MEDICINE

## 2025-04-24 PROCEDURE — 1036F TOBACCO NON-USER: CPT | Performed by: FAMILY MEDICINE

## 2025-04-24 PROCEDURE — 81002 URINALYSIS NONAUTO W/O SCOPE: CPT | Performed by: FAMILY MEDICINE

## 2025-04-24 PROCEDURE — PBSHW POCT URINALYSIS DIPSTICK: Performed by: FAMILY MEDICINE

## 2025-04-24 PROCEDURE — 2022F DILAT RTA XM EVC RTNOPTHY: CPT | Performed by: FAMILY MEDICINE

## 2025-04-24 PROCEDURE — 3051F HG A1C>EQUAL 7.0%<8.0%: CPT | Performed by: FAMILY MEDICINE

## 2025-04-24 PROCEDURE — 1123F ACP DISCUSS/DSCN MKR DOCD: CPT | Performed by: FAMILY MEDICINE

## 2025-04-24 PROCEDURE — 1159F MED LIST DOCD IN RCRD: CPT | Performed by: FAMILY MEDICINE

## 2025-04-24 PROCEDURE — 3078F DIAST BP <80 MM HG: CPT | Performed by: FAMILY MEDICINE

## 2025-04-24 PROCEDURE — 1160F RVW MEDS BY RX/DR IN RCRD: CPT | Performed by: FAMILY MEDICINE

## 2025-04-24 PROCEDURE — G8400 PT W/DXA NO RESULTS DOC: HCPCS | Performed by: FAMILY MEDICINE

## 2025-04-24 PROCEDURE — 3017F COLORECTAL CA SCREEN DOC REV: CPT | Performed by: FAMILY MEDICINE

## 2025-04-24 PROCEDURE — 83036 HEMOGLOBIN GLYCOSYLATED A1C: CPT | Performed by: FAMILY MEDICINE

## 2025-04-24 PROCEDURE — G8417 CALC BMI ABV UP PARAM F/U: HCPCS | Performed by: FAMILY MEDICINE

## 2025-04-24 PROCEDURE — PBSHW POCT GLYCOSYLATED HEMOGLOBIN (HGB A1C): Performed by: FAMILY MEDICINE

## 2025-04-24 RX ORDER — ONDANSETRON 4 MG/1
4 TABLET, ORALLY DISINTEGRATING ORAL 3 TIMES DAILY PRN
Qty: 21 TABLET | Refills: 1 | Status: SHIPPED | OUTPATIENT
Start: 2025-04-24

## 2025-04-24 RX ORDER — DICYCLOMINE HYDROCHLORIDE 10 MG/1
10 CAPSULE ORAL
Qty: 60 CAPSULE | Refills: 1 | Status: SHIPPED | OUTPATIENT
Start: 2025-04-24

## 2025-04-24 RX ORDER — FLUCONAZOLE 150 MG/1
TABLET ORAL
Qty: 10 TABLET | Refills: 0 | Status: SHIPPED | OUTPATIENT
Start: 2025-04-24

## 2025-04-24 RX ORDER — CIPROFLOXACIN 500 MG/1
500 TABLET, FILM COATED ORAL 2 TIMES DAILY
Qty: 20 TABLET | Refills: 0 | Status: SHIPPED | OUTPATIENT
Start: 2025-04-24 | End: 2025-05-04

## 2025-04-24 ASSESSMENT — PATIENT HEALTH QUESTIONNAIRE - PHQ9
2. FEELING DOWN, DEPRESSED OR HOPELESS: NOT AT ALL
SUM OF ALL RESPONSES TO PHQ QUESTIONS 1-9: 0
SUM OF ALL RESPONSES TO PHQ QUESTIONS 1-9: 0
1. LITTLE INTEREST OR PLEASURE IN DOING THINGS: NOT AT ALL
SUM OF ALL RESPONSES TO PHQ QUESTIONS 1-9: 0
SUM OF ALL RESPONSES TO PHQ QUESTIONS 1-9: 0

## 2025-04-24 NOTE — PROGRESS NOTES
facility-administered medications for this visit.     Allergies   Allergen Reactions    Sulfa Antibiotics Hives    Metronidazole Nausea And Vomiting       Health Maintenance   Topic Date Due    DTaP/Tdap/Td vaccine (1 - Tdap) Never done    Shingles vaccine (1 of 2) Never done    Respiratory Syncytial Virus (RSV) Pregnant or age 60 yrs+ (1 - Risk 60-74 years 1-dose series) Never done    Diabetic retinal exam  09/03/2021    Diabetic Alb to Cr ratio (uACR) test  11/15/2022    COVID-19 Vaccine (5 - 2024-25 season) 09/01/2024    Annual Wellness Visit (Medicare)  10/19/2024    Depression Screen  01/22/2025    Diabetic foot exam  10/24/2025    Lipids  10/24/2025    GFR test (Diabetes, CKD 3-4, OR last GFR 15-59)  10/24/2025    Breast cancer screen  11/14/2025    A1C test (Diabetic or Prediabetic)  04/24/2026    Colorectal Cancer Screen  10/18/2029    DEXA (modify frequency per FRAX score)  Completed    Flu vaccine  Completed    Pneumococcal 50+ years Vaccine  Completed    Hepatitis C screen  Completed    Hepatitis A vaccine  Aged Out    Hepatitis B vaccine  Aged Out    Hib vaccine  Aged Out    Polio vaccine  Aged Out    Meningococcal (ACWY) vaccine  Aged Out    Meningococcal B vaccine  Aged Out         Review of Systems   Constitutional:  Negative for fatigue.   HENT:  Negative for sore throat.    Eyes:  Negative for visual disturbance.   Respiratory:  Negative for shortness of breath.    Cardiovascular:  Negative for chest pain and palpitations.   Gastrointestinal:  Positive for abdominal pain (chronic). Negative for constipation, diarrhea, nausea and vomiting.   Endocrine: Negative.    Genitourinary:  Positive for dysuria, frequency and urgency.   Musculoskeletal: Negative.    Skin:  Negative for rash.   Neurological:  Negative for headaches.   Psychiatric/Behavioral: Negative.         Objective:     /78 (BP Site: Left Upper Arm, Patient Position: Sitting, BP Cuff Size: Medium Adult)   Pulse 78   Wt 81.2 kg (179

## 2025-05-05 ENCOUNTER — HOSPITAL ENCOUNTER (OUTPATIENT)
Dept: INTERVENTIONAL RADIOLOGY/VASCULAR | Age: 70
Discharge: HOME | End: 2025-05-07
Payer: MEDICARE

## 2025-05-05 DIAGNOSIS — D17.22 LIPOMA OF LEFT UPPER EXTREMITY: ICD-10-CM

## 2025-05-05 PROCEDURE — 76882 US LMTD JT/FCL EVL NVASC XTR: CPT

## 2025-05-07 ENCOUNTER — RESULTS FOLLOW-UP (OUTPATIENT)
Dept: FAMILY MEDICINE CLINIC | Age: 70
End: 2025-05-07

## 2025-05-13 ENCOUNTER — TELEPHONE (OUTPATIENT)
Dept: FAMILY MEDICINE CLINIC | Age: 70
End: 2025-05-13

## 2025-05-13 DIAGNOSIS — D17.22 LIPOMA OF LEFT UPPER EXTREMITY: Primary | ICD-10-CM

## 2025-05-13 NOTE — TELEPHONE ENCOUNTER
Berenice toney and [facility] is telling her she needs a referral placed for Dr Tenorio to review her test results and to see her regarding her Lyphoma

## 2025-06-05 ENCOUNTER — TELEPHONE (OUTPATIENT)
Dept: SURGERY | Age: 70
End: 2025-06-05

## 2025-06-05 ENCOUNTER — PREP FOR PROCEDURE (OUTPATIENT)
Dept: SURGERY | Age: 70
End: 2025-06-05

## 2025-06-05 ENCOUNTER — PROCEDURE VISIT (OUTPATIENT)
Dept: SURGERY | Age: 70
End: 2025-06-05
Payer: MEDICARE

## 2025-06-05 VITALS
BODY MASS INDEX: 33.27 KG/M2 | SYSTOLIC BLOOD PRESSURE: 138 MMHG | WEIGHT: 180.8 LBS | DIASTOLIC BLOOD PRESSURE: 70 MMHG | HEIGHT: 62 IN | RESPIRATION RATE: 16 BRPM | HEART RATE: 84 BPM

## 2025-06-05 DIAGNOSIS — D17.22 LIPOMA OF LEFT UPPER EXTREMITY: Primary | ICD-10-CM

## 2025-06-05 DIAGNOSIS — M79.602 LEFT ARM PAIN: ICD-10-CM

## 2025-06-05 DIAGNOSIS — D17.22 LIPOMA OF LEFT UPPER EXTREMITY: ICD-10-CM

## 2025-06-05 PROCEDURE — 3075F SYST BP GE 130 - 139MM HG: CPT | Performed by: SURGERY

## 2025-06-05 PROCEDURE — 99214 OFFICE O/P EST MOD 30 MIN: CPT | Performed by: SURGERY

## 2025-06-05 PROCEDURE — G8427 DOCREV CUR MEDS BY ELIG CLIN: HCPCS | Performed by: SURGERY

## 2025-06-05 PROCEDURE — 1159F MED LIST DOCD IN RCRD: CPT | Performed by: SURGERY

## 2025-06-05 PROCEDURE — G8400 PT W/DXA NO RESULTS DOC: HCPCS | Performed by: SURGERY

## 2025-06-05 PROCEDURE — 1125F AMNT PAIN NOTED PAIN PRSNT: CPT | Performed by: SURGERY

## 2025-06-05 PROCEDURE — 1123F ACP DISCUSS/DSCN MKR DOCD: CPT | Performed by: SURGERY

## 2025-06-05 PROCEDURE — 3017F COLORECTAL CA SCREEN DOC REV: CPT | Performed by: SURGERY

## 2025-06-05 PROCEDURE — G8417 CALC BMI ABV UP PARAM F/U: HCPCS | Performed by: SURGERY

## 2025-06-05 PROCEDURE — 99215 OFFICE O/P EST HI 40 MIN: CPT | Performed by: SURGERY

## 2025-06-05 PROCEDURE — 1036F TOBACCO NON-USER: CPT | Performed by: SURGERY

## 2025-06-05 PROCEDURE — 3078F DIAST BP <80 MM HG: CPT | Performed by: SURGERY

## 2025-06-05 PROCEDURE — 1090F PRES/ABSN URINE INCON ASSESS: CPT | Performed by: SURGERY

## 2025-06-05 NOTE — TELEPHONE ENCOUNTER
[facility]         Patient:Alexus Harris           :1955           Surgical/Procedure Planned: excision left arm lipoma    Date & Location: 2025 at [facility]       Outpatient   Planned Length of OR: 45 min    Sedation: intravenous sedation        Estimated Cardiac Risk for Non-Cardiac Surgery/Procedure     Low______ Moderate______ High______    Medication Instructions - Clarification needed by this date:       Farxiga Hold ___ Days  Metformin Hold ___ Days  Toujeo Max  Hold ___ Days        Provider:Dr. Tenorio      Signature of Provider Giving Orders for Medication holds:    _____________________________________________

## 2025-06-05 NOTE — PROGRESS NOTES
Alexus Harris is a 70 y.o. female      CC:    Lipoma left arm about 4.5 cm per ultrasound  Post fall 1 week ago and sore right upper quadrant    HISTORY OF PRESENT ILLNESS:    Patient is a 70-year-old female here to have a lipoma removed.  She had a mass in the left upper arm posterior shoulder area that is gotten bigger and causing some discomfort.  She had an ultrasound which showed a 4.5 cm lipoma.    Also patient fell over a bush about a week ago and pulled on her right upper quadrant.  It hurts when she moves.  She is worried about a recurrent hernia.  We did a large ventral hernia repair with mesh about a year ago.  She also had a lot of adhesions.    Patient here for lipoma on left arm- states had it for 2 years, has increased in size and causing her pain.      US done on 5/5/2025 of arm which showed:  In the region of an apparently palpable lump there is a solid-appearing mass  measuring 45.6 x 45.1 x 25.3 mm.  There is minimal peripheral color flow.  The structure is essentially isoechoic to the regional fatty tissues.  Although nonspecific this would be compatible with suspected history of  lipoma.  IMPRESSION:  Small solid mass corresponding to a palpable lump, nonspecific but would be  compatible with a lipoma.     Also reports she fell on Monday and has had pain on RUQ since. Unable to sleep. Reports Dr. Tenorio has done a lot of procedures in that area and would like him to look at that area as well.    Review of Systems:    General:  Fever: Negative  Weight Change:Negative  Night Sweats: Negative    Eye:  Blurry Vision:Negative  Double Vision: Negative    Ent:  Headaches: Negative  Sore throat: Negative    Allergy/Immunology:  Hives: Negative  Latex allergy: Negative    Hematology/Lymphatic:  Bleeding Problems: Negative  Blood Clots: Negative  Swollen Lymph Nodes: Negative    Lungs:  Cough: Negative  SOB: Negative  Wheezing:Negative    Cardiovascular:  Chest Pain:

## 2025-06-05 NOTE — PROGRESS NOTES
Patient here for lipoma on left arm- states had it for 2 years, has increased in size and causing her pain.     US done on 5/5/2025 of arm which showed:  In the region of an apparently palpable lump there is a solid-appearing mass  measuring 45.6 x 45.1 x 25.3 mm.  There is minimal peripheral color flow.  The structure is essentially isoechoic to the regional fatty tissues.  Although nonspecific this would be compatible with suspected history of  lipoma.  IMPRESSION:  Small solid mass corresponding to a palpable lump, nonspecific but would be  compatible with a lipoma.    Also reports she fell on Monday and has had pain on RUQ since. Unable to sleep. Reports Dr. Tenorio has done a lot of procedures in that area and would like him to look at that area as well.

## 2025-06-05 NOTE — TELEPHONE ENCOUNTER
[facility]         Patient:Alexus Harris           :1955           Surgical/Procedure Planned: excision left arm lipoma    Date & Location: 2025 at [facility]       Outpatient   Planned Length of OR: 45 min    Sedation: intravenous sedation        Estimated Cardiac Risk for Non-Cardiac Surgery/Procedure     Low__xx____ Moderate______ High______    Medication Instructions - Clarification needed by this date:       Farxiga Hold _2__ Days  Metformin Hold _1__ Days  Toujeo Max  Hold _1__ Days        Provider:Dr. Tenorio      Signature of Provider Giving Orders for Medication holds:    __________Paige Smith MD________

## 2025-06-09 DIAGNOSIS — B37.31 YEAST VAGINITIS: ICD-10-CM

## 2025-06-09 DIAGNOSIS — E11.8 TYPE 2 DIABETES MELLITUS WITH COMPLICATION, WITHOUT LONG-TERM CURRENT USE OF INSULIN (HCC): ICD-10-CM

## 2025-06-09 RX ORDER — FLUCONAZOLE 150 MG/1
TABLET ORAL
Qty: 10 TABLET | Refills: 0 | Status: SHIPPED | OUTPATIENT
Start: 2025-06-09

## 2025-06-09 NOTE — TELEPHONE ENCOUNTER
Alexus Harris is requesting a refill on the following medication(s):  Requested Prescriptions     Pending Prescriptions Disp Refills    fluconazole (DIFLUCAN) 150 MG tablet [Pharmacy Med Name: Fluconazole 150 MG Oral Tablet] 10 tablet 0     Sig: TAKE 1 TABLET BY MOUTH EVERY 3 DAYS AS NEEDED FOR RECURRENT YEAST       Last Visit Date (If Applicable):  4/24/2025      Next Visit Date:    10/27/2025

## 2025-06-11 ENCOUNTER — OFFICE VISIT (OUTPATIENT)
Dept: FAMILY MEDICINE CLINIC | Age: 70
End: 2025-06-11
Payer: MEDICARE

## 2025-06-11 VITALS — DIASTOLIC BLOOD PRESSURE: 86 MMHG | OXYGEN SATURATION: 95 % | SYSTOLIC BLOOD PRESSURE: 138 MMHG | HEART RATE: 88 BPM

## 2025-06-11 DIAGNOSIS — M54.50 ACUTE RIGHT-SIDED LOW BACK PAIN WITHOUT SCIATICA: ICD-10-CM

## 2025-06-11 DIAGNOSIS — R07.81 RIB PAIN ON RIGHT SIDE: Primary | ICD-10-CM
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
## 2025-06-11 DIAGNOSIS — I10 HYPERTENSION, ESSENTIAL: ICD-10-CM

## 2025-06-11 DIAGNOSIS — M54.6 ACUTE RIGHT-SIDED THORACIC BACK PAIN: ICD-10-CM

## 2025-06-11 PROCEDURE — 99213 OFFICE O/P EST LOW 20 MIN: CPT | Performed by: FAMILY MEDICINE

## 2025-06-11 RX ORDER — TRAMADOL HYDROCHLORIDE 50 MG/1
50 TABLET ORAL EVERY 8 HOURS PRN
Qty: 15 TABLET | Refills: 0 | Status: SHIPPED | OUTPATIENT
Start: 2025-06-11 | End: 2025-06-16

## 2025-06-11 NOTE — PROGRESS NOTES
treatment plan. Follow up as directed.     The patient (or guardian, if applicable) and other individuals in attendance with the patient were advised that Artificial Intelligence will be utilized during this visit to record, process the conversation to generate a clinical note, and support improvement of the AI technology. The patient (or guardian, if applicable) and other individuals in attendance at the appointment consented to the use of AI, including the recording.                   Electronically signed by Paige Smith MD on 6/11/2025

## 2025-06-13 ENCOUNTER — TELEPHONE (OUTPATIENT)
Dept: FAMILY MEDICINE CLINIC | Age: 70
End: 2025-06-13

## 2025-06-13 NOTE — TELEPHONE ENCOUNTER
Patient called requesting results of the test.  Xray show back arthritis but no fractures.   notified patient of the results with MOM.

## 2025-06-16 ENCOUNTER — TELEPHONE (OUTPATIENT)
Dept: SURGERY | Age: 70
End: 2025-06-16

## 2025-06-16 NOTE — TELEPHONE ENCOUNTER
Patient called to reschedule lipoma removal with Dr. Tenorio. States she fell and hurt her ribs. Pt. Is scheduled in the OR on 6/19/25. Please call [phone] to reschedule patient.

## 2025-07-02 ENCOUNTER — TELEPHONE (OUTPATIENT)
Dept: FAMILY MEDICINE CLINIC | Age: 70
End: 2025-07-02

## 2025-07-02 DIAGNOSIS — Z79.4 DIABETES MELLITUS TYPE 2, INSULIN DEPENDENT (HCC): Primary | ICD-10-CM

## 2025-07-02 DIAGNOSIS — E11.9 DIABETES MELLITUS TYPE 2, INSULIN DEPENDENT (HCC): Primary | ICD-10-CM

## 2025-07-02 NOTE — TELEPHONE ENCOUNTER
Alexus Harris is requesting a refill on the following medication(s):  Requested Prescriptions     Pending Prescriptions Disp Refills    metFORMIN (GLUCOPHAGE) 500 MG tablet 90 tablet 5     Sig: Take 1 tablet by mouth 3 times daily       Last Visit Date (If Applicable):  6/11/2025      Next Visit Date:    10/27/2025

## 2025-07-09 ENCOUNTER — ANESTHESIA (OUTPATIENT)
Dept: OPERATING ROOM | Age: 70
End: 2025-07-09
Payer: MEDICARE

## 2025-07-09 ENCOUNTER — ANESTHESIA EVENT (OUTPATIENT)
Dept: OPERATING ROOM | Age: 70
End: 2025-07-09
Payer: MEDICARE

## 2025-07-09 ENCOUNTER — HOSPITAL ENCOUNTER (OUTPATIENT)
Age: 70
Setting detail: OUTPATIENT SURGERY
Discharge: HOME | End: 2025-07-09
Attending: SURGERY | Admitting: SURGERY
Payer: MEDICARE

## 2025-07-09 VITALS
HEIGHT: 62 IN | RESPIRATION RATE: 16 BRPM | OXYGEN SATURATION: 95 % | HEART RATE: 80 BPM | TEMPERATURE: 97.4 F | SYSTOLIC BLOOD PRESSURE: 145 MMHG | BODY MASS INDEX: 32.79 KG/M2 | WEIGHT: 178.2 LBS | DIASTOLIC BLOOD PRESSURE: 65 MMHG

## 2025-07-09 DIAGNOSIS — M79.602 LEFT ARM PAIN: ICD-10-CM

## 2025-07-09 DIAGNOSIS — D17.22 LIPOMA OF LEFT UPPER EXTREMITY: ICD-10-CM

## 2025-07-09 DIAGNOSIS — G89.18 POST-OPERATIVE PAIN: Primary | ICD-10-CM

## 2025-07-09 PROCEDURE — 3700000001 HC ADD 15 MINUTES (ANESTHESIA): Performed by: SURGERY

## 2025-07-09 PROCEDURE — 7100000010 HC PHASE II RECOVERY - FIRST 15 MIN: Performed by: SURGERY

## 2025-07-09 PROCEDURE — 3700000000 HC ANESTHESIA ATTENDED CARE: Performed by: SURGERY

## 2025-07-09 PROCEDURE — 3600000012 HC SURGERY LEVEL 2 ADDTL 15MIN: Performed by: SURGERY

## 2025-07-09 PROCEDURE — 6370000000 HC RX 637 (ALT 250 FOR IP): Performed by: SURGERY

## 2025-07-09 PROCEDURE — 2580000003 HC RX 258: Performed by: SURGERY

## 2025-07-09 PROCEDURE — 6360000002 HC RX W HCPCS: Performed by: NURSE ANESTHETIST, CERTIFIED REGISTERED

## 2025-07-09 PROCEDURE — 6360000002 HC RX W HCPCS: Performed by: SURGERY

## 2025-07-09 PROCEDURE — 88304 TISSUE EXAM BY PATHOLOGIST: CPT

## 2025-07-09 PROCEDURE — 3600000002 HC SURGERY LEVEL 2 BASE: Performed by: SURGERY

## 2025-07-09 PROCEDURE — 7100000011 HC PHASE II RECOVERY - ADDTL 15 MIN: Performed by: SURGERY

## 2025-07-09 PROCEDURE — 2709999900 HC NON-CHARGEABLE SUPPLY: Performed by: SURGERY

## 2025-07-09 RX ORDER — SODIUM CHLORIDE 0.9 % (FLUSH) 0.9 %
5-40 SYRINGE (ML) INJECTION PRN
Status: DISCONTINUED | OUTPATIENT
Start: 2025-07-09 | End: 2025-07-09 | Stop reason: HOSPADM

## 2025-07-09 RX ORDER — SODIUM CHLORIDE 0.9 % (FLUSH) 0.9 %
5-40 SYRINGE (ML) INJECTION EVERY 12 HOURS SCHEDULED
Status: DISCONTINUED | OUTPATIENT
Start: 2025-07-09 | End: 2025-07-09 | Stop reason: HOSPADM

## 2025-07-09 RX ORDER — LIDOCAINE HYDROCHLORIDE 10 MG/ML
INJECTION, SOLUTION EPIDURAL; INFILTRATION; INTRACAUDAL; PERINEURAL
Status: DISCONTINUED | OUTPATIENT
Start: 2025-07-09 | End: 2025-07-09 | Stop reason: SDUPTHER

## 2025-07-09 RX ORDER — DEXAMETHASONE SODIUM PHOSPHATE 10 MG/ML
INJECTION, SOLUTION INTRA-ARTICULAR; INTRALESIONAL; INTRAMUSCULAR; INTRAVENOUS; SOFT TISSUE
Status: DISCONTINUED | OUTPATIENT
Start: 2025-07-09 | End: 2025-07-09 | Stop reason: SDUPTHER

## 2025-07-09 RX ORDER — HYDROCODONE BITARTRATE AND ACETAMINOPHEN 5; 325 MG/1; MG/1
1 TABLET ORAL EVERY 6 HOURS PRN
Status: DISCONTINUED | OUTPATIENT
Start: 2025-07-09 | End: 2025-07-09 | Stop reason: HOSPADM

## 2025-07-09 RX ORDER — HYDROCODONE BITARTRATE AND ACETAMINOPHEN 5; 325 MG/1; MG/1
1 TABLET ORAL EVERY 6 HOURS PRN
Qty: 12 TABLET | Refills: 0 | Status: SHIPPED | OUTPATIENT
Start: 2025-07-09 | End: 2025-07-12

## 2025-07-09 RX ORDER — SODIUM CHLORIDE, SODIUM LACTATE, POTASSIUM CHLORIDE, CALCIUM CHLORIDE 600; 310; 30; 20 MG/100ML; MG/100ML; MG/100ML; MG/100ML
INJECTION, SOLUTION INTRAVENOUS CONTINUOUS
Status: DISCONTINUED | OUTPATIENT
Start: 2025-07-09 | End: 2025-07-09 | Stop reason: HOSPADM

## 2025-07-09 RX ORDER — PROPOFOL 10 MG/ML
INJECTION, EMULSION INTRAVENOUS
Status: DISCONTINUED | OUTPATIENT
Start: 2025-07-09 | End: 2025-07-09 | Stop reason: SDUPTHER

## 2025-07-09 RX ORDER — SODIUM CHLORIDE 9 MG/ML
INJECTION, SOLUTION INTRAVENOUS PRN
Status: DISCONTINUED | OUTPATIENT
Start: 2025-07-09 | End: 2025-07-09 | Stop reason: HOSPADM

## 2025-07-09 RX ORDER — ONDANSETRON 2 MG/ML
INJECTION INTRAMUSCULAR; INTRAVENOUS
Status: DISCONTINUED | OUTPATIENT
Start: 2025-07-09 | End: 2025-07-09 | Stop reason: SDUPTHER

## 2025-07-09 RX ORDER — FENTANYL CITRATE 50 UG/ML
INJECTION, SOLUTION INTRAMUSCULAR; INTRAVENOUS
Status: DISCONTINUED | OUTPATIENT
Start: 2025-07-09 | End: 2025-07-09 | Stop reason: SDUPTHER

## 2025-07-09 RX ADMIN — PROPOFOL 140 MCG/KG/MIN: 10 INJECTION, EMULSION INTRAVENOUS at 07:37

## 2025-07-09 RX ADMIN — Medication 2000 MG: at 07:37

## 2025-07-09 RX ADMIN — ONDANSETRON 4 MG: 2 INJECTION INTRAMUSCULAR; INTRAVENOUS at 07:36

## 2025-07-09 RX ADMIN — LIDOCAINE HYDROCHLORIDE 50 MG: 10 INJECTION, SOLUTION EPIDURAL; INFILTRATION; INTRACAUDAL; PERINEURAL at 07:36

## 2025-07-09 RX ADMIN — FENTANYL CITRATE 50 MCG: 50 INJECTION, SOLUTION INTRAMUSCULAR; INTRAVENOUS at 07:35

## 2025-07-09 RX ADMIN — DEXAMETHASONE SODIUM PHOSPHATE 10 MG: 10 INJECTION INTRAMUSCULAR; INTRAVENOUS at 07:36

## 2025-07-09 RX ADMIN — HYDROCODONE BITARTRATE AND ACETAMINOPHEN 1 TABLET: 5; 325 TABLET ORAL at 08:56

## 2025-07-09 RX ADMIN — PROPOFOL 100 MG: 10 INJECTION, EMULSION INTRAVENOUS at 07:36

## 2025-07-09 RX ADMIN — FENTANYL CITRATE 50 MCG: 50 INJECTION, SOLUTION INTRAMUSCULAR; INTRAVENOUS at 07:46

## 2025-07-09 RX ADMIN — SODIUM CHLORIDE, POTASSIUM CHLORIDE, SODIUM LACTATE AND CALCIUM CHLORIDE: 600; 310; 30; 20 INJECTION, SOLUTION INTRAVENOUS at 07:08

## 2025-07-09 ASSESSMENT — PAIN DESCRIPTION - ORIENTATION
ORIENTATION: LEFT

## 2025-07-09 ASSESSMENT — PAIN DESCRIPTION - DESCRIPTORS
DESCRIPTORS: ACHING
DESCRIPTORS: ACHING
DESCRIPTORS: SHARP;NAGGING;ACHING
DESCRIPTORS: ACHING
DESCRIPTORS: ACHING

## 2025-07-09 ASSESSMENT — PAIN DESCRIPTION - LOCATION
LOCATION: SHOULDER

## 2025-07-09 ASSESSMENT — PAIN DESCRIPTION - PAIN TYPE
TYPE: SURGICAL PAIN
TYPE: SURGICAL PAIN

## 2025-07-09 ASSESSMENT — PAIN - FUNCTIONAL ASSESSMENT
PAIN_FUNCTIONAL_ASSESSMENT: 0-10
PAIN_FUNCTIONAL_ASSESSMENT: 0-10

## 2025-07-09 ASSESSMENT — PAIN SCALES - GENERAL
PAINLEVEL_OUTOF10: 3
PAINLEVEL_OUTOF10: 5
PAINLEVEL_OUTOF10: 3

## 2025-07-09 NOTE — ANESTHESIA PRE PROCEDURE
Department of Anesthesiology  Preprocedure Note       Name:  Alexus Harris   Age:  70 y.o.  :  1955                                          MRN:  [id number]         Date:  2025      Surgeon: Surgeon(s):  Rivera Tenorio MD    Procedure: Procedure(s):  Excision left arm lipoma    Medications prior to admission:   Prior to Admission medications    Medication Sig Start Date End Date Taking? Authorizing Provider   tiZANidine (ZANAFLEX) 4 MG tablet Take 1 tablet by mouth nightly as needed (muscle tension) 25  Yes Paige Smith MD   dicyclomine (BENTYL) 10 MG capsule Take 1 capsule by mouth 4 times daily (before meals and nightly) 25  Yes Paige Smith MD   ondansetron (ZOFRAN-ODT) 4 MG disintegrating tablet Take 1 tablet by mouth 3 times daily as needed for Nausea or Vomiting 25  Yes Paige Smith MD   rosuvastatin (CRESTOR) 10 MG tablet Take 1 tablet by mouth nightly 24  Yes Paige Smith MD   TOUJEO MAX SOLOSTAR 300 UNIT/ML concentrated injection pen INJECT 12 UNITS SUBCUTANEOUSLY ONCE DAILY 24  Yes Paige Smith MD   amLODIPine (NORVASC) 10 MG tablet Take 1 tablet by mouth once daily 24  Yes Paige Smith MD   omeprazole (PRILOSEC) 20 MG delayed release capsule Take 1 capsule by mouth once daily 24  Yes Paige Smith MD   metoprolol succinate (TOPROL XL) 25 MG extended release tablet Take 1/2 (one-half) tablet by mouth twice daily 24  Yes Paige Smith MD   ondansetron (ZOFRAN-ODT) 4 MG disintegrating tablet Take 1 tablet by mouth 3 times daily as needed for Nausea or Vomiting 24  Yes Paige Smith MD   metFORMIN (GLUCOPHAGE) 500 MG tablet Take 1 tablet by mouth 3 times daily 25   Paige Smith MD   fluconazole (DIFLUCAN) 150 MG tablet TAKE 1 TABLET BY MOUTH EVERY 3 DAYS AS NEEDED FOR RECURRENT YEAST 25   Paige Smith MD   COMFORT EZ PEN NEEDLES 31G X 8 MM MISC USE AS DIRECTED ONCE DAILY 24   Sarah

## 2025-07-09 NOTE — DISCHARGE INSTRUCTIONS
May remove the dressings after 24 hours, leave steristrips in place if present  May shower after 24 hours, no tub bath or soaking for 2 weeks  Follow up as needed with general surgery in 1-2 weeks with Michael Bañuelos PA-C   we will call with any pathology results if any were sent during the procedure  Use norco sparingly as needed for pain and reduce how much you are using as pain improves.       DIET  You may resume your usual diet, unless otherwise instructed.    MEDICATIONS  You may resume your usual medications.  If pain medicine is prescribed, take only as directed. Keep in mind that narcotic medication can lead to constipation - you may want to take an OTC stool softener.  DO NOT drive if taking narcotic pain medicine.    INCISION CARE  Keep your incision clean and dry.  You may change your bandage if needed.  Use an ice pack to your surgical site for 20 minutes at a time.  Try to do this 4-6 times daily for the next 3 days.  Keeping your incision site elevated above the level of your heart will help prevent/reduce swelling.    FOLLOW-UP  As scheduled    CALL YOUR PHYSICIAN if  Excessive swelling, redness or warmth develops surrounding your incision (small amounts of these may occur due to the healing process)  Drainage from your incision that appears to be infected.    IF YOU HAVE QUESTIONS OR CONCERNS CALL:  [facility] [phone] or after business hours call [facility] [phone] and ask that your physician be paged.

## 2025-07-09 NOTE — ANESTHESIA POSTPROCEDURE EVALUATION
Department of Anesthesiology  Postprocedure Note    Patient: Alexus Harris  MRN: [id number]  YOB: 1955  Date of evaluation: 7/9/2025    Procedure Summary       Date: 07/09/25 Room / Location: [address]    Anesthesia Start: 0725 Anesthesia Stop: 0818    Procedure: Excision left arm lipoma (Left) Diagnosis:       Lipoma of left upper extremity      Left arm pain      (Lipoma of left upper extremity [D17.22])      (Left arm pain [M79.602])    Surgeons: Rivera Tenorio MD Responsible Provider: Ghulam Chanel II, APRN - CRNA    Anesthesia Type: General, TIVA ASA Status: 2            Anesthesia Type: General, TIVA    Douglas Phase I: Douglas Score: 10    Douglas Phase II: Douglas Score: 10    Anesthesia Post Evaluation    Patient location during evaluation: bedside  Patient participation: complete - patient participated  Level of consciousness: awake  Pain score: 0  Airway patency: patent  Nausea & Vomiting: no nausea and no vomiting  Cardiovascular status: hemodynamically stable  Respiratory status: spontaneous ventilation  Hydration status: stable  Pain management: satisfactory to patient        No notable events documented.   96

## 2025-07-09 NOTE — OP NOTE
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       Procedure(s):  Excision left arm lipoma    Surgeon(s):  Rivera Tenorio MD    Assistant:   * No surgical staff found *    Anesthesia: Monitor Anesthesia Care    Estimated Blood Loss (mL): Minimal    Complications: None    Specimens:   ID Type Source Tests Collected by Time Destination   A : LEFT UPPER ARM LIPOMA Tissue Arm SURGICAL PATHOLOGY Rivera Tenorio MD 7/9/2025 0754        Implants:  * No implants in log *      Drains: * No LDAs found *    Findings:  Infection Present At Time Of Surgery (PATOS) (choose all levels that have infection present):  No infection present  Other Findings: see postop diagnosis  This procedure was not performed to treat primary cutaneous melanoma through wide local excision    Detailed Description of Procedure:   Patient was taken to the OR placed in a right lateral decubitus position.  The left shoulder was prepped and draped in the usual sterile fashion.  We made a incision over the mass and took it down to the subcutaneous tissue.  The incision was about 3-1/2 cm.  We went through the subcu and you could feel the mass was intramuscular.  We opened the muscular fascia divided the fibers bluntly and identified the lipoma.  It was about 5-1/2 cm x 7 cm.  We were able to shell it out nicely there was a small pedicle and we clamped it with a hemostat and transected the lipoma and sent it to pathology.  We we tied the pedicle off with a 2-0 Vicryl suture.  Bleeding was controlled with electrocautery the wound was irrigated we closed the muscular fascia with 2-0 Vicryl sutures irrigated the subcu we closed the skin with 3-0 Vicryl subicular sutures and a running 4-0 Monocryl Steri-Strips

## 2025-07-10 LAB — SURGICAL PATHOLOGY REPORT: NORMAL

## 2025-07-25 DIAGNOSIS — I10 HYPERTENSION, ESSENTIAL: ICD-10-CM

## 2025-07-25 DIAGNOSIS — E11.8 TYPE 2 DIABETES MELLITUS WITH COMPLICATION, WITHOUT LONG-TERM CURRENT USE OF INSULIN (HCC): ICD-10-CM

## 2025-07-25 RX ORDER — METOPROLOL SUCCINATE 25 MG/1
TABLET, EXTENDED RELEASE ORAL
Qty: 90 TABLET | Refills: 3 | Status: SHIPPED | OUTPATIENT
Start: 2025-07-25

## 2025-07-25 NOTE — TELEPHONE ENCOUNTER
Alexus Harris is requesting a refill on the following medication(s):  Requested Prescriptions     Pending Prescriptions Disp Refills    metoprolol succinate (TOPROL XL) 25 MG extended release tablet [Pharmacy Med Name: Metoprolol Succinate ER 25 MG Oral Tablet Extended Release 24 Hour] 90 tablet 0     Sig: Take 1/2 (one-half) tablet by mouth twice daily       Last Visit Date (If Applicable):  6/11/2025      Next Visit Date:    10/27/2025

## (undated) DEVICE — SOLUTION IRRIG 1000ML 09% SOD CHL USP PIC PLAS CONTAINER

## (undated) DEVICE — SUTURE VICRYL SZ 2-0 L27IN ABSRB VLT L26MM SH 1/2 CIR J317H

## (undated) DEVICE — ARM DRAPE

## (undated) DEVICE — GARMENT,MEDLINE,DVT,INT,CALF,MED, GEN2: Brand: MEDLINE

## (undated) DEVICE — SUTURE VICRYL SZ 3-0 L27IN ABSRB UD L26MM CT-2 1/2 CIR J232H

## (undated) DEVICE — TOTAL TRAY, DB, 100% SILI FOLEY, 16FR 10: Brand: MEDLINE

## (undated) DEVICE — SUCTION IRRIGATOR: Brand: ENDOWRIST

## (undated) DEVICE — SUTURE MONOCRYL SZ 4-0 L18IN ABSRB UD L19MM PS-2 3/8 CIR PRIM Y496G

## (undated) DEVICE — SUTURE V LOC 1 L18IN NONABSORBABLE GS-21 TAPERPOINT NDL VLOCN0327

## (undated) DEVICE — STRIP,CLOSURE,WOUND,MEDI-STRIP,1/2X4: Brand: MEDLINE

## (undated) DEVICE — MASTISOL ADHESIVE LIQ 2/3ML

## (undated) DEVICE — SEAL

## (undated) DEVICE — PACK SURG PROC REMINDER N WVN DISPOSABLE BEAC TIME OUT

## (undated) DEVICE — MDHZ GEN LAPAROSCOPY&ROBOT: Brand: MEDLINE INDUSTRIES, INC.

## (undated) DEVICE — TIP COVER ACCESSORY

## (undated) DEVICE — DRESSING TRNSPAR W2XL2.75IN FLM SHT SEMIPERMEABLE WIND

## (undated) DEVICE — GAUZE,SPONGE,2"X2",8PLY,STERILE,LF,2'S: Brand: MEDLINE

## (undated) DEVICE — GLOVE ORANGE PI 7   MSG9070

## (undated) DEVICE — SUTURE VICRYL SZ 4-0 L18IN ABSRB UD L19MM PS-2 3/8 CIR PRIM J496H

## (undated) DEVICE — GOWN,AURORA,NONRNF,XL,30/CS: Brand: MEDLINE

## (undated) DEVICE — MDHZ LAPAROTOMY: Brand: MEDLINE INDUSTRIES, INC.

## (undated) DEVICE — BLADELESS OBTURATOR: Brand: WECK VISTA

## (undated) DEVICE — GLOVE ORANGE PI 7 1/2   MSG9075

## (undated) DEVICE — TROCAR: Brand: KII FIOS FIRST ENTRY

## (undated) DEVICE — INSUFFLATION NEEDLE TO ESTABLISH PNEUMOPERITONEUM.: Brand: INSUFFLATION NEEDLE

## (undated) DEVICE — SOLUTION IRRIG 3000ML 0.9% SOD CHL USP UROMATIC PLAS CONT